# Patient Record
Sex: FEMALE | Race: WHITE | NOT HISPANIC OR LATINO | Employment: OTHER | URBAN - METROPOLITAN AREA
[De-identification: names, ages, dates, MRNs, and addresses within clinical notes are randomized per-mention and may not be internally consistent; named-entity substitution may affect disease eponyms.]

---

## 2017-01-11 ENCOUNTER — OFFICE VISIT (OUTPATIENT)
Dept: FAMILY MEDICINE CLINIC | Facility: CLINIC | Age: 77
End: 2017-01-11

## 2017-01-11 VITALS
TEMPERATURE: 97.6 F | BODY MASS INDEX: 50.99 KG/M2 | DIASTOLIC BLOOD PRESSURE: 76 MMHG | RESPIRATION RATE: 18 BRPM | OXYGEN SATURATION: 94 % | HEART RATE: 66 BPM | HEIGHT: 63 IN | SYSTOLIC BLOOD PRESSURE: 164 MMHG | WEIGHT: 287.8 LBS

## 2017-01-11 DIAGNOSIS — F41.9 ANXIETY: ICD-10-CM

## 2017-01-11 DIAGNOSIS — E53.8 COBALAMIN DEFICIENCY: ICD-10-CM

## 2017-01-11 DIAGNOSIS — E55.9 VITAMIN D DEFICIENCY: Primary | ICD-10-CM

## 2017-01-11 DIAGNOSIS — E66.01 OBESITY, MORBID, BMI 50 OR HIGHER (HCC): ICD-10-CM

## 2017-01-11 DIAGNOSIS — I89.0 LYMPHEDEMA: ICD-10-CM

## 2017-01-11 DIAGNOSIS — I10 ESSENTIAL HYPERTENSION: ICD-10-CM

## 2017-01-11 DIAGNOSIS — I87.2 VENOUS STASIS DERMATITIS OF BOTH LOWER EXTREMITIES: ICD-10-CM

## 2017-01-11 DIAGNOSIS — D75.1 SECONDARY POLYCYTHEMIA: ICD-10-CM

## 2017-01-11 DIAGNOSIS — G47.33 OBSTRUCTIVE SLEEP APNEA SYNDROME: ICD-10-CM

## 2017-01-11 DIAGNOSIS — Z79.899 HIGH RISK MEDICATION USE: ICD-10-CM

## 2017-01-11 PROCEDURE — 99214 OFFICE O/P EST MOD 30 MIN: CPT | Performed by: FAMILY MEDICINE

## 2017-01-11 RX ORDER — CLOBETASOL PROPIONATE 0.5 MG/G
OINTMENT TOPICAL 2 TIMES DAILY
Qty: 60 G | Refills: 2 | Status: SHIPPED | OUTPATIENT
Start: 2017-01-11 | End: 2018-07-23

## 2017-01-11 RX ORDER — HYDROCODONE BITARTRATE AND ACETAMINOPHEN 5; 325 MG/1; MG/1
1 TABLET ORAL EVERY 4 HOURS PRN
Qty: 120 TABLET | Refills: 0 | Status: SHIPPED | OUTPATIENT
Start: 2017-01-11 | End: 2017-04-12 | Stop reason: ALTCHOICE

## 2017-01-11 NOTE — PROGRESS NOTES
HPI  Tamara Singh is a 76 y.o. female who is here for follow up of chronic back pain as well as a rash on her lower legs and other medical issues as noted.  Overall status is stable except rash on legs has worsened since her brother is no longer able to apply wraps.  Patient will call dermatologist for reevaluation.  Also has a persistent lesion on her back which needs to be checked.  Otherwise no new significant medical complaints.      Review of Systems   Musculoskeletal: Positive for back pain.   Skin: Positive for rash.   All other systems reviewed and are negative.        Past Medical History   Diagnosis Date   • Arthritis    • Hypertension        Past Surgical History   Procedure Laterality Date   • Cholecystectomy  1974       No family history on file.    Social History     Social History   • Marital status: Single     Spouse name: N/A   • Number of children: N/A   • Years of education: N/A     Occupational History   • Not on file.     Social History Main Topics   • Smoking status: Not on file   • Smokeless tobacco: Not on file   • Alcohol use Not on file   • Drug use: Not on file   • Sexual activity: Not on file     Other Topics Concern   • Not on file     Social History Narrative   • No narrative on file         Physical Exam   Constitutional: She is oriented to person, place, and time. She appears well-developed and well-nourished. No distress.   HENT:   Head: Normocephalic.   Mouth/Throat: Oropharynx is clear and moist.   Eyes: Conjunctivae are normal. Pupils are equal, round, and reactive to light.   Neck: No thyromegaly present.   Cardiovascular: Normal rate and regular rhythm.    Pulmonary/Chest: Effort normal and breath sounds normal.   Abdominal: Soft. She exhibits no distension and no mass. There is no tenderness.   Musculoskeletal: She exhibits no edema or deformity.   Neurological: She is alert and oriented to person, place, and time. She exhibits normal muscle tone. Coordination normal.   Skin:  Skin is warm and dry.        Psychiatric: She has a normal mood and affect. Her behavior is normal. Judgment and thought content normal.   Nursing note and vitals reviewed.        Assessment/Plan    Tamara was seen today for hypertension, back pain and edema.    Diagnoses and all orders for this visit:    Vitamin D deficiency  -     Vitamin D 1,25 Dihydroxy    Cobalamin deficiency  -     CBC & Differential  -     Vitamin B12    Obesity, morbid, BMI 50 or higher    Obstructive sleep apnea syndrome    Secondary polycythemia    High risk medication use  -     CBC & Differential  -     Comprehensive Metabolic Panel    Anxiety    Lymphedema    Venous stasis dermatitis of both lower extremities    Essential hypertension    Other orders  -     clobetasol (TEMOVATE) 0.05 % ointment; Apply  topically 2 (Two) Times a Day.  -     HYDROcodone-acetaminophen (NORCO) 5-325 MG per tablet; Take 1 tablet by mouth Every 4 (Four) Hours As Needed for moderate pain (4-6).        Patient is here for routine follow-up of multiple medical problems as noted above.  Continues with back pain and also stasis dermatitis both lower extremities.  Reports poor control with current ointment and will change as requested.  Also has a skin lesion on the back and recommend follow-up with her dermatologist.  Will get routine lab tests as discussed, otherwise continue current therapy and follow-up in 3 months or as needed.

## 2017-01-11 NOTE — MR AVS SNAPSHOT
Tamara GONZALEZ Francisco   1/11/2017 2:00 PM   Office Visit    Dept Phone:  [phone]   Encounter #:  [id number]    Provider:  Dawson Sauceda MD   Department:  [facility]                Your Full Care Plan              Today's Medication Changes          These changes are accurate as of: 1/11/17  2:26 PM.  If you have any questions, ask your nurse or doctor.               Medication(s)that have changed:     clobetasol 0.05 % ointment   Commonly known as:  TEMOVATE   Apply  topically 2 (Two) Times a Day.   What changed:  when to take this         Stop taking medication(s)listed here:     betamethasone dipropionate 0.05 % ointment   Commonly known as:  DIPROLENE           fluocinolone 0.025 % ointment   Commonly known as:  SYNALAR           MethylPREDNISolone 4 MG tablet   Commonly known as:  MEDROL                Where to Get Your Medications      These medications were sent to [facility]/pharmacy [address] AT [place] [phone] [place] [phone] [place] [address]     Phone:  [phone]     clobetasol 0.05 % ointment         You can get these medications from any pharmacy     Bring a paper prescription for each of these medications     HYDROcodone-acetaminophen 5-325 MG per tablet                  Your Updated Medication List          This list is accurate as of: 1/11/17  2:26 PM.  Always use your most recent med list.                amLODIPine-benazepril 10-40 MG per capsule   Commonly known as:  LOTREL   TAKE ONE CAPSULE BY MOUTH EVERY DAY       clobetasol 0.05 % ointment   Commonly known as:  TEMOVATE   Apply  topically 2 (Two) Times a Day.       clonazePAM 0.5 MG tablet   Commonly known as:  KlonoPIN   TAKE 1 TABLET BY MOUTH TWICE DAILY AS NEEDED FOR ANXIETY       cyclobenzaprine 5 MG tablet   Commonly known as:  FLEXERIL   Take 1 tablet by mouth 3 (three) times a day as  needed for muscle spasms.       HYDROcodone-acetaminophen 5-325 MG per tablet   Commonly known as:  NORCO   Take 1 tablet by mouth Every 4 (Four) Hours As Needed for moderate pain (4-6).       LASIX 40 MG tablet   Generic drug:  furosemide   TAKE 1 TABLETBY MOUTH EVERY MORNING       meloxicam 7.5 MG tablet   Commonly known as:  MOBIC   Take 1 tablet by mouth Daily.       PROAIR  (90 BASE) MCG/ACT inhaler   Generic drug:  albuterol   INHALE 2 PUFFS BY MOUTH 4 TIMES DAILY AS NEEDED       VASCULERA tablet       Vitamin D3 2000 UNITS capsule               We Performed the Following     CBC & Differential     Comprehensive Metabolic Panel     Vitamin B12     Vitamin D 1,25 Dihydroxy       You Were Diagnosed With        Codes Comments    Vitamin D deficiency    -  Primary ICD-10-CM: E55.9  ICD-9-CM: 268.9     Cobalamin deficiency     ICD-10-CM: E53.8  ICD-9-CM: 266.2     Obesity, morbid, BMI 50 or higher     ICD-10-CM: E66.01  ICD-9-CM: 278.01     Obstructive sleep apnea syndrome     ICD-10-CM: G47.33  ICD-9-CM: 327.23     Secondary polycythemia     ICD-10-CM: D75.1  ICD-9-CM: 289.0     High risk medication use     ICD-10-CM: Z79.899  ICD-9-CM: V58.69     Anxiety     ICD-10-CM: F41.9  ICD-9-CM: 300.00     Lymphedema     ICD-10-CM: I89.0  ICD-9-CM: 457.1     Venous stasis dermatitis of both lower extremities     ICD-10-CM: I83.11, I83.12  ICD-9-CM: 454.1     Essential hypertension     ICD-10-CM: I10  ICD-9-CM: 401.9       Instructions     None    Patient Instructions History      Upcoming Appointments     Visit Type Date Time Department    OFFICE VISIT 1/11/2017  2:00 PM [facility] [place]    OFFICE VISIT 4/12/2017  2:00 PM [employer] [facility]     Our records indicate that you have an active [employer] account.    You can view your After Visit Summary by going to mCASH and logging in with your [employer]nite username and password.  If you don't have a [employer] username and  "password but a parent or guardian has access to your record, the parent or guardian should login with their own [employer] username and password and access your record to view the After Visit Summary.    If you have questions, you can email [email or web address] or call [phone] to talk to our [employer] staff.  Remember, [employer] is NOT to be used for urgent needs.  For medical emergencies, dial 911.               Other Info from Your Visit           Your Appointments     Apr 12, 2017  2:00 PM EDT   Office Visit with Dawson Sauceda MD   [facility] (--)    [address] [facility] [address] [phone]           Arrive 15 minutes prior to appointment.              Allergies     Moxifloxacin      Penicillins        Reason for Visit     Hypertension 3 month follow up.    Back Pain lower back is red also.    Edema Both legs very red today. needs to make appt with Dr. Ralph Morel.      Vital Signs     Blood Pressure Pulse Temperature Respirations Height Weight    164/76 66 97.6 °F (36.4 °C) 18 62.5\" (158.8 cm) 287 lb 12.8 oz (131 kg)    Oxygen Saturation Body Mass Index Smoking Status             94% 51.8 kg/m2 Never Assessed         Problems and Diagnoses Noted     Anxiety problem    Vitamin B12 deficiency    High blood pressure    Lymphedema    Obesity, morbid, BMI 50 or higher    Sleep apnea    Elevated red blood cell count    Stasis dermatitis    Vitamin D deficiency    High risk medication use            "

## 2017-01-13 LAB
1,25(OH)2D3 SERPL-MCNC: 46.3 PG/ML (ref 19.9–79.3)
ALBUMIN SERPL-MCNC: 4.6 G/DL (ref 3.5–5.2)
ALBUMIN/GLOB SERPL: 1.3 G/DL
ALP SERPL-CCNC: 79 U/L (ref 39–117)
ALT SERPL-CCNC: 15 U/L (ref 1–33)
AST SERPL-CCNC: 22 U/L (ref 1–32)
BASOPHILS # BLD AUTO: 0.01 10*3/MM3 (ref 0–0.2)
BASOPHILS NFR BLD AUTO: 0.2 % (ref 0–1.5)
BILIRUB SERPL-MCNC: 0.8 MG/DL (ref 0.1–1.2)
BUN SERPL-MCNC: 17 MG/DL (ref 8–23)
BUN/CREAT SERPL: 21.8 (ref 7–25)
CALCIUM SERPL-MCNC: 10.1 MG/DL (ref 8.6–10.5)
CHLORIDE SERPL-SCNC: 99 MMOL/L (ref 98–107)
CO2 SERPL-SCNC: 27.4 MMOL/L (ref 22–29)
CREAT SERPL-MCNC: 0.78 MG/DL (ref 0.57–1)
EOSINOPHIL # BLD AUTO: 0.1 10*3/MM3 (ref 0–0.7)
EOSINOPHIL NFR BLD AUTO: 1.6 % (ref 0.3–6.2)
ERYTHROCYTE [DISTWIDTH] IN BLOOD BY AUTOMATED COUNT: 13.8 % (ref 11.7–13)
GLOBULIN SER CALC-MCNC: 3.5 GM/DL
GLUCOSE SERPL-MCNC: 87 MG/DL (ref 65–99)
HCT VFR BLD AUTO: 47.5 % (ref 35.6–45.5)
HGB BLD-MCNC: 15.5 G/DL (ref 11.9–15.5)
IMM GRANULOCYTES # BLD: 0 10*3/MM3 (ref 0–0.03)
IMM GRANULOCYTES NFR BLD: 0 % (ref 0–0.5)
LYMPHOCYTES # BLD AUTO: 2.68 10*3/MM3 (ref 0.9–4.8)
LYMPHOCYTES NFR BLD AUTO: 42.5 % (ref 19.6–45.3)
MCH RBC QN AUTO: 30.3 PG (ref 26.9–32)
MCHC RBC AUTO-ENTMCNC: 32.6 G/DL (ref 32.4–36.3)
MCV RBC AUTO: 93 FL (ref 80.5–98.2)
MONOCYTES # BLD AUTO: 0.5 10*3/MM3 (ref 0.2–1.2)
MONOCYTES NFR BLD AUTO: 7.9 % (ref 5–12)
NEUTROPHILS # BLD AUTO: 3.01 10*3/MM3 (ref 1.9–8.1)
NEUTROPHILS NFR BLD AUTO: 47.8 % (ref 42.7–76)
PLATELET # BLD AUTO: 268 10*3/MM3 (ref 140–500)
POTASSIUM SERPL-SCNC: 4.3 MMOL/L (ref 3.5–5.2)
PROT SERPL-MCNC: 8.1 G/DL (ref 6–8.5)
RBC # BLD AUTO: 5.11 10*6/MM3 (ref 3.9–5.2)
SODIUM SERPL-SCNC: 143 MMOL/L (ref 136–145)
VIT B12 SERPL-MCNC: 429 PG/ML (ref 211–946)
WBC # BLD AUTO: 6.3 10*3/MM3 (ref 4.5–10.7)

## 2017-03-09 RX ORDER — AMLODIPINE BESYLATE AND BENAZEPRIL HYDROCHLORIDE 10; 40 MG/1; MG/1
CAPSULE ORAL
Qty: 90 CAPSULE | Refills: 0 | Status: SHIPPED | OUTPATIENT
Start: 2017-03-09 | End: 2017-06-26 | Stop reason: SDUPTHER

## 2017-04-07 RX ORDER — METHYLPREDNISOLONE 4 MG/1
TABLET ORAL
Qty: 21 TABLET | Refills: 0 | Status: SHIPPED | OUTPATIENT
Start: 2017-04-07 | End: 2017-04-12 | Stop reason: ALTCHOICE

## 2017-04-11 RX ORDER — MELOXICAM 7.5 MG/1
TABLET ORAL
Qty: 30 TABLET | Refills: 5 | Status: SHIPPED | OUTPATIENT
Start: 2017-04-11 | End: 2017-07-19

## 2017-04-12 ENCOUNTER — OFFICE VISIT (OUTPATIENT)
Dept: FAMILY MEDICINE CLINIC | Facility: CLINIC | Age: 77
End: 2017-04-12

## 2017-04-12 VITALS
OXYGEN SATURATION: 98 % | TEMPERATURE: 97.5 F | SYSTOLIC BLOOD PRESSURE: 142 MMHG | RESPIRATION RATE: 17 BRPM | BODY MASS INDEX: 50.92 KG/M2 | HEART RATE: 54 BPM | WEIGHT: 287.4 LBS | DIASTOLIC BLOOD PRESSURE: 72 MMHG | HEIGHT: 63 IN

## 2017-04-12 DIAGNOSIS — I89.0 LYMPHEDEMA: ICD-10-CM

## 2017-04-12 DIAGNOSIS — E66.01 OBESITY, MORBID, BMI 50 OR HIGHER (HCC): ICD-10-CM

## 2017-04-12 DIAGNOSIS — M54.16 LUMBAR RADICULOPATHY: ICD-10-CM

## 2017-04-12 DIAGNOSIS — F41.9 ANXIETY: ICD-10-CM

## 2017-04-12 DIAGNOSIS — I10 ESSENTIAL HYPERTENSION: Primary | ICD-10-CM

## 2017-04-12 PROCEDURE — 99213 OFFICE O/P EST LOW 20 MIN: CPT | Performed by: FAMILY MEDICINE

## 2017-04-12 RX ORDER — HYDROCODONE BITARTRATE AND ACETAMINOPHEN 7.5; 325 MG/1; MG/1
1 TABLET ORAL EVERY 4 HOURS PRN
Qty: 120 TABLET | Refills: 0 | Status: SHIPPED | OUTPATIENT
Start: 2017-04-12 | End: 2017-07-19 | Stop reason: SDUPTHER

## 2017-04-12 NOTE — PROGRESS NOTES
HPI  Tamara Singh is a 77 y.o. female who is here for follow up especially of back pain.  Recently was given a Medrol Dosepak which she reports gives her significant relief to her back pain and she again is able to get up and walk around.  Otherwise overall status basically has remained stable.  Reviewed lab test from 3 months ago all of which were unremarkable.      Review of Systems   Musculoskeletal: Positive for arthralgias and back pain.   All other systems reviewed and are negative.        Past Medical History:   Diagnosis Date   • Arthritis    • Hypertension        Past Surgical History:   Procedure Laterality Date   • CHOLECYSTECTOMY  1974       No family history on file.    Social History     Social History   • Marital status: Single     Spouse name: N/A   • Number of children: N/A   • Years of education: N/A     Occupational History   • Not on file.     Social History Main Topics   • Smoking status: Not on file   • Smokeless tobacco: Not on file   • Alcohol use Not on file   • Drug use: Not on file   • Sexual activity: Not on file     Other Topics Concern   • Not on file     Social History Narrative   • No narrative on file         Physical Exam   Constitutional: She is oriented to person, place, and time. She appears well-developed and well-nourished. No distress.   HENT:   Head: Normocephalic.   Mouth/Throat: Oropharynx is clear and moist.   Eyes: Conjunctivae are normal. Pupils are equal, round, and reactive to light.   Neck: No thyromegaly present.   Cardiovascular: Normal rate and regular rhythm.    Pulmonary/Chest: Effort normal. No respiratory distress.   Abdominal: Soft. She exhibits no distension. There is no tenderness.   Musculoskeletal: She exhibits edema and deformity. She exhibits no tenderness.   Ambulates with a cane   Neurological: She is alert and oriented to person, place, and time. Coordination normal.   Skin: Skin is warm and dry.   Psychiatric: She has a normal mood and affect. Her  behavior is normal. Judgment and thought content normal.   Nursing note and vitals reviewed.        Assessment/Plan    Tamara was seen today for vitamin d deficiency, hypertension, obesity, back pain and anxiety.    Diagnoses and all orders for this visit:    Essential hypertension    Obesity, morbid, BMI 50 or higher    Lumbar radiculopathy    Lymphedema    Anxiety    Other orders  -     HYDROcodone-acetaminophen (NORCO) 7.5-325 MG per tablet; Take 1 tablet by mouth Every 4 (Four) Hours As Needed for Moderate Pain (4-6).      Patient is here for routine follow-up especially of recurrent back pain.  Again got good relief with a Medrol dose pack.  Also request increase dose of current pain medication which is given inadequate relief.  Continue current therapy and follow-up in 3 months at which time blood test will be rechecked.    This note includes information entered using a voice recognition dictation system.  Though reviewed, some nonsensible errors may remain.

## 2017-05-23 RX ORDER — METHYLPREDNISOLONE 4 MG/1
TABLET ORAL
Qty: 21 TABLET | Refills: 0 | Status: SHIPPED | OUTPATIENT
Start: 2017-05-23 | End: 2017-06-30 | Stop reason: SDUPTHER

## 2017-06-27 RX ORDER — AMLODIPINE BESYLATE AND BENAZEPRIL HYDROCHLORIDE 10; 40 MG/1; MG/1
CAPSULE ORAL
Qty: 90 CAPSULE | Refills: 0 | Status: SHIPPED | OUTPATIENT
Start: 2017-06-27 | End: 2017-09-15 | Stop reason: SDUPTHER

## 2017-06-30 RX ORDER — METHYLPREDNISOLONE 4 MG/1
TABLET ORAL
Qty: 21 TABLET | Refills: 0 | Status: SHIPPED | OUTPATIENT
Start: 2017-06-30 | End: 2017-07-19

## 2017-07-11 RX ORDER — FUROSEMIDE 40 MG/1
TABLET ORAL
Qty: 90 TABLET | Refills: 3 | Status: SHIPPED | OUTPATIENT
Start: 2017-07-11 | End: 2018-07-04 | Stop reason: SDUPTHER

## 2017-07-19 ENCOUNTER — OFFICE VISIT (OUTPATIENT)
Dept: FAMILY MEDICINE CLINIC | Facility: CLINIC | Age: 77
End: 2017-07-19

## 2017-07-19 VITALS
OXYGEN SATURATION: 94 % | HEIGHT: 62 IN | BODY MASS INDEX: 51.53 KG/M2 | SYSTOLIC BLOOD PRESSURE: 136 MMHG | WEIGHT: 280 LBS | DIASTOLIC BLOOD PRESSURE: 82 MMHG | RESPIRATION RATE: 16 BRPM | HEART RATE: 59 BPM | TEMPERATURE: 98.4 F

## 2017-07-19 DIAGNOSIS — I10 ESSENTIAL HYPERTENSION: ICD-10-CM

## 2017-07-19 DIAGNOSIS — E66.01 OBESITY, MORBID, BMI 50 OR HIGHER (HCC): Primary | ICD-10-CM

## 2017-07-19 DIAGNOSIS — Z79.899 HIGH RISK MEDICATION USE: ICD-10-CM

## 2017-07-19 DIAGNOSIS — G89.29 CHRONIC LEFT-SIDED LOW BACK PAIN WITH LEFT-SIDED SCIATICA: ICD-10-CM

## 2017-07-19 DIAGNOSIS — G47.33 OBSTRUCTIVE SLEEP APNEA SYNDROME: ICD-10-CM

## 2017-07-19 DIAGNOSIS — R53.83 FATIGUE, UNSPECIFIED TYPE: ICD-10-CM

## 2017-07-19 DIAGNOSIS — M54.6 PAIN IN THORACIC SPINE: ICD-10-CM

## 2017-07-19 DIAGNOSIS — M54.42 CHRONIC LEFT-SIDED LOW BACK PAIN WITH LEFT-SIDED SCIATICA: ICD-10-CM

## 2017-07-19 DIAGNOSIS — I89.0 LYMPHEDEMA: ICD-10-CM

## 2017-07-19 PROCEDURE — 99213 OFFICE O/P EST LOW 20 MIN: CPT | Performed by: FAMILY MEDICINE

## 2017-07-19 RX ORDER — HYDROCODONE BITARTRATE AND ACETAMINOPHEN 7.5; 325 MG/1; MG/1
1 TABLET ORAL EVERY 4 HOURS PRN
Qty: 120 TABLET | Refills: 0 | Status: SHIPPED | OUTPATIENT
Start: 2017-07-19 | End: 2017-10-20 | Stop reason: SDUPTHER

## 2017-07-19 RX ORDER — LANOLIN ALCOHOL/MO/W.PET/CERES
1000 CREAM (GRAM) TOPICAL 2 TIMES DAILY
COMMUNITY
End: 2017-10-20 | Stop reason: SDUPTHER

## 2017-07-19 NOTE — PROGRESS NOTES
HPI  Tamara Singh is a 77 y.o. female who is here for follow up of chronic back pain as well as general osteoarthritis and hypertension.  Patient reports some fatigue and also cold intolerance.      Review of Systems   Constitutional: Positive for chills.   Respiratory: Positive for apnea.    Musculoskeletal: Positive for back pain.   Psychiatric/Behavioral: The patient is nervous/anxious.    All other systems reviewed and are negative.        Past Medical History:   Diagnosis Date   • Arthritis    • Hypertension        Past Surgical History:   Procedure Laterality Date   • CHOLECYSTECTOMY  1974       No family history on file.    Social History     Social History   • Marital status: Single     Spouse name: N/A   • Number of children: N/A   • Years of education: N/A     Occupational History   • Not on file.     Social History Main Topics   • Smoking status: Not on file   • Smokeless tobacco: Not on file   • Alcohol use Not on file   • Drug use: Not on file   • Sexual activity: Not on file     Other Topics Concern   • Not on file     Social History Narrative   • No narrative on file         Physical Exam   Constitutional: She is oriented to person, place, and time. She appears well-developed and well-nourished.   HENT:   Head: Normocephalic.   Mouth/Throat: Oropharynx is clear and moist.   Eyes: Conjunctivae are normal. Pupils are equal, round, and reactive to light.   Neck: Normal range of motion. Neck supple. No tracheal deviation present.   Cardiovascular: Normal rate, regular rhythm and normal heart sounds.    Pulmonary/Chest: Effort normal and breath sounds normal. No respiratory distress.   Abdominal: Soft. She exhibits no distension.   Musculoskeletal: She exhibits edema and deformity.   Neurological: She is alert and oriented to person, place, and time. Coordination normal.   Skin: Skin is warm and dry.   Psychiatric: She has a normal mood and affect. Her behavior is normal. Judgment and thought content  normal.   Nursing note and vitals reviewed.        Assessment/Plan    Tamara was seen today for hypertension and obesity.    Diagnoses and all orders for this visit:    Obesity, morbid, BMI 50 or higher    Obstructive sleep apnea syndrome    Lymphedema    Pain in thoracic spine    High risk medication use  -     CBC & Differential  -     Comprehensive Metabolic Panel    Fatigue, unspecified type  -     CBC & Differential  -     TSH+Free T4    Essential hypertension  -     Comprehensive Metabolic Panel    Chronic left-sided low back pain with left-sided sciatica    Other orders  -     HYDROcodone-acetaminophen (NORCO) 7.5-325 MG per tablet; Take 1 tablet by mouth Every 4 (Four) Hours As Needed for Moderate Pain .        Patient here for routine follow-up especially chronic back pain which is only relieved with occasional pain medication as noted above.  Usually prescription lasts her 3 months and recently not being abused etc.  Also patient reports some general fatigue and cold intolerance and will get routine lab work as noted above as well as thyroid panel.  Otherwise continue current therapy and follow-up in 3 months or as needed.    This note includes information entered using a voice recognition dictation system.  Though reviewed, some nonsensible errors may remain.

## 2017-07-20 LAB
ALBUMIN SERPL-MCNC: 4.3 G/DL (ref 3.5–4.8)
ALBUMIN/GLOB SERPL: 1.3 {RATIO} (ref 1.2–2.2)
ALP SERPL-CCNC: 72 IU/L (ref 39–117)
ALT SERPL-CCNC: 18 IU/L (ref 0–32)
AST SERPL-CCNC: 25 IU/L (ref 0–40)
BASOPHILS # BLD AUTO: 0 X10E3/UL (ref 0–0.2)
BASOPHILS NFR BLD AUTO: 0 %
BILIRUB SERPL-MCNC: 0.9 MG/DL (ref 0–1.2)
BUN SERPL-MCNC: 17 MG/DL (ref 8–27)
BUN/CREAT SERPL: 22 (ref 12–28)
CALCIUM SERPL-MCNC: 9.8 MG/DL (ref 8.7–10.3)
CHLORIDE SERPL-SCNC: 96 MMOL/L (ref 96–106)
CO2 SERPL-SCNC: 25 MMOL/L (ref 18–29)
CREAT SERPL-MCNC: 0.79 MG/DL (ref 0.57–1)
EOSINOPHIL # BLD AUTO: 0.1 X10E3/UL (ref 0–0.4)
EOSINOPHIL NFR BLD AUTO: 2 %
ERYTHROCYTE [DISTWIDTH] IN BLOOD BY AUTOMATED COUNT: 15.1 % (ref 12.3–15.4)
GLOBULIN SER CALC-MCNC: 3.4 G/DL (ref 1.5–4.5)
GLUCOSE SERPL-MCNC: 85 MG/DL (ref 65–99)
HCT VFR BLD AUTO: 44.5 % (ref 34–46.6)
HGB BLD-MCNC: 15.5 G/DL (ref 11.1–15.9)
IMM GRANULOCYTES # BLD: 0 X10E3/UL (ref 0–0.1)
IMM GRANULOCYTES NFR BLD: 0 %
LYMPHOCYTES # BLD AUTO: 2.8 X10E3/UL (ref 0.7–3.1)
LYMPHOCYTES NFR BLD AUTO: 37 %
MCH RBC QN AUTO: 30.8 PG (ref 26.6–33)
MCHC RBC AUTO-ENTMCNC: 34.8 G/DL (ref 31.5–35.7)
MCV RBC AUTO: 88 FL (ref 79–97)
MONOCYTES # BLD AUTO: 0.5 X10E3/UL (ref 0.1–0.9)
MONOCYTES NFR BLD AUTO: 7 %
NEUTROPHILS # BLD AUTO: 4.1 X10E3/UL (ref 1.4–7)
NEUTROPHILS NFR BLD AUTO: 54 %
PLATELET # BLD AUTO: 288 X10E3/UL (ref 150–379)
POTASSIUM SERPL-SCNC: 4.4 MMOL/L (ref 3.5–5.2)
PROT SERPL-MCNC: 7.7 G/DL (ref 6–8.5)
RBC # BLD AUTO: 5.04 X10E6/UL (ref 3.77–5.28)
SODIUM SERPL-SCNC: 141 MMOL/L (ref 134–144)
T4 FREE SERPL-MCNC: 1.29 NG/DL (ref 0.82–1.77)
TSH SERPL DL<=0.005 MIU/L-ACNC: 4.41 UIU/ML (ref 0.45–4.5)
WBC # BLD AUTO: 7.6 X10E3/UL (ref 3.4–10.8)

## 2017-09-16 RX ORDER — AMLODIPINE BESYLATE AND BENAZEPRIL HYDROCHLORIDE 10; 40 MG/1; MG/1
CAPSULE ORAL
Qty: 90 CAPSULE | Refills: 0 | Status: SHIPPED | OUTPATIENT
Start: 2017-09-16 | End: 2017-12-13 | Stop reason: SDUPTHER

## 2017-10-20 ENCOUNTER — OFFICE VISIT (OUTPATIENT)
Dept: FAMILY MEDICINE CLINIC | Facility: CLINIC | Age: 77
End: 2017-10-20

## 2017-10-20 VITALS
BODY MASS INDEX: 52.08 KG/M2 | SYSTOLIC BLOOD PRESSURE: 130 MMHG | OXYGEN SATURATION: 96 % | DIASTOLIC BLOOD PRESSURE: 68 MMHG | RESPIRATION RATE: 16 BRPM | WEIGHT: 283 LBS | TEMPERATURE: 97.3 F | HEIGHT: 62 IN | HEART RATE: 54 BPM

## 2017-10-20 DIAGNOSIS — D75.1 SECONDARY POLYCYTHEMIA: ICD-10-CM

## 2017-10-20 DIAGNOSIS — I89.0 LYMPHEDEMA: ICD-10-CM

## 2017-10-20 DIAGNOSIS — M54.16 LUMBAR RADICULOPATHY: ICD-10-CM

## 2017-10-20 DIAGNOSIS — G47.33 OBSTRUCTIVE SLEEP APNEA SYNDROME: ICD-10-CM

## 2017-10-20 DIAGNOSIS — E66.01 OBESITY, MORBID, BMI 50 OR HIGHER (HCC): ICD-10-CM

## 2017-10-20 DIAGNOSIS — I10 ESSENTIAL HYPERTENSION: Primary | ICD-10-CM

## 2017-10-20 DIAGNOSIS — I83.12 VARICOSE VEINS OF BOTH LOWER EXTREMITIES WITH INFLAMMATION: ICD-10-CM

## 2017-10-20 DIAGNOSIS — E53.8 COBALAMIN DEFICIENCY: ICD-10-CM

## 2017-10-20 DIAGNOSIS — I83.11 VARICOSE VEINS OF BOTH LOWER EXTREMITIES WITH INFLAMMATION: ICD-10-CM

## 2017-10-20 DIAGNOSIS — E55.9 VITAMIN D DEFICIENCY: ICD-10-CM

## 2017-10-20 PROCEDURE — G0008 ADMIN INFLUENZA VIRUS VAC: HCPCS | Performed by: FAMILY MEDICINE

## 2017-10-20 PROCEDURE — 99213 OFFICE O/P EST LOW 20 MIN: CPT | Performed by: FAMILY MEDICINE

## 2017-10-20 RX ORDER — LANOLIN ALCOHOL/MO/W.PET/CERES
1000 CREAM (GRAM) TOPICAL DAILY
Qty: 100 TABLET | Refills: 3
Start: 2017-10-20 | End: 2020-07-06

## 2017-10-20 RX ORDER — BETAMETHASONE DIPROPIONATE 0.05 %
OINTMENT (GRAM) TOPICAL
Refills: 2 | COMMUNITY
Start: 2017-09-18 | End: 2018-07-23

## 2017-10-20 RX ORDER — CHOLECALCIFEROL (VITAMIN D3) 25 MCG
1000 CAPSULE ORAL DAILY
Qty: 100 CAPSULE | Refills: 3
Start: 2017-10-20 | End: 2020-07-06

## 2017-10-20 RX ORDER — HYDROCODONE BITARTRATE AND ACETAMINOPHEN 7.5; 325 MG/1; MG/1
1 TABLET ORAL EVERY 4 HOURS PRN
Qty: 120 TABLET | Refills: 0 | Status: SHIPPED | OUTPATIENT
Start: 2017-10-20 | End: 2018-04-18 | Stop reason: SDUPTHER

## 2017-10-20 RX ORDER — METHYLPREDNISOLONE 4 MG/1
TABLET ORAL
Qty: 21 TABLET | Refills: 0 | Status: SHIPPED | OUTPATIENT
Start: 2017-10-20 | End: 2018-01-19

## 2017-10-20 NOTE — PROGRESS NOTES
HPI  Tamara Singh is a 77 y.o. female who is here for follow up chronic low back pain relieved with occasional pain medication and very occasional Medrol Dosepak.  Overall denies any new complaints.  Lab work from 3 months ago unremarkable.  Chronic lymphedema seems to be fairly stable.  No skin breakdown.      Review of Systems   Cardiovascular: Positive for leg swelling.   Musculoskeletal: Positive for back pain.         Past Medical History:   Diagnosis Date   • Arthritis    • Hypertension        Past Surgical History:   Procedure Laterality Date   • CHOLECYSTECTOMY  1974       No family history on file.    Social History     Social History   • Marital status: Single     Spouse name: N/A   • Number of children: N/A   • Years of education: N/A     Occupational History   • Not on file.     Social History Main Topics   • Smoking status: Never Smoker   • Smokeless tobacco: Never Used   • Alcohol use No   • Drug use: Yes     Special: Hydrocodone   • Sexual activity: No     Other Topics Concern   • Not on file     Social History Narrative         Physical Exam   Constitutional: She is oriented to person, place, and time. She appears well-developed and well-nourished.   HENT:   Head: Normocephalic.   Mouth/Throat: Oropharynx is clear and moist.   Eyes: EOM are normal. Pupils are equal, round, and reactive to light.   Neck: Normal range of motion. Neck supple. No thyromegaly present.   Cardiovascular: Normal rate, regular rhythm and normal heart sounds.    Pulmonary/Chest: Effort normal and breath sounds normal. No respiratory distress.   Abdominal: Soft. Bowel sounds are normal. She exhibits no distension and no mass.   Musculoskeletal: She exhibits no edema or deformity.   Neurological: She is alert and oriented to person, place, and time. Coordination normal.   Skin: Skin is warm and dry.   Psychiatric: She has a normal mood and affect. Her behavior is normal. Judgment and thought content normal.   Nursing note and  vitals reviewed.        Assessment/Plan    Tamara was seen today for hypertension and obesity.    Diagnoses and all orders for this visit:    Essential hypertension    Varicose veins of both lower extremities with inflammation    Obstructive sleep apnea syndrome    Vitamin D deficiency  -     Cholecalciferol (VITAMIN D3) 1000 units capsule; Take 1,000 Units by mouth Daily. TAKE 1 CAPSULE BY MOUTH DAILY    Obesity, morbid, BMI 50 or higher    Lumbar radiculopathy  -     MethylPREDNISolone (MEDROL) 4 MG tablet; follow package directions    Lymphedema    Secondary polycythemia    Cobalamin deficiency  -     vitamin B-12 (CYANOCOBALAMIN) 1000 MCG tablet; Take 1 tablet by mouth Daily.    Other orders  -     HYDROcodone-acetaminophen (NORCO) 7.5-325 MG per tablet; Take 1 tablet by mouth Every 4 (Four) Hours As Needed for Moderate Pain .      Patient is here for routine follow-up of above-noted medical problems.  Overall seems to be stable on current regimen which will be continued including follow-up every 3 months.  Most recent lab work unremarkable and will be repeated at next visit.    This note includes information entered using a voice recognition dictation system.  Though reviewed, some nonsensible errors may remain.

## 2017-11-03 RX ORDER — CLONAZEPAM 0.5 MG/1
TABLET ORAL
Qty: 60 TABLET | Refills: 2 | Status: SHIPPED | OUTPATIENT
Start: 2017-11-03 | End: 2019-07-19

## 2017-11-03 NOTE — TELEPHONE ENCOUNTER
Phoned medication into [facility] pharmacy with 2 refills.    Thank you.

## 2017-11-03 NOTE — TELEPHONE ENCOUNTER
Future appointment 01/19/2018.    Last O.V. 10/20/2017.  Angel 07/16/2017 (UPDATED).  Filled 10/20/2017.    Thank you.

## 2017-12-13 RX ORDER — AMLODIPINE BESYLATE AND BENAZEPRIL HYDROCHLORIDE 10; 40 MG/1; MG/1
CAPSULE ORAL
Qty: 90 CAPSULE | Refills: 3 | Status: SHIPPED | OUTPATIENT
Start: 2017-12-13 | End: 2018-12-03 | Stop reason: SDUPTHER

## 2018-01-19 ENCOUNTER — OFFICE VISIT (OUTPATIENT)
Dept: FAMILY MEDICINE CLINIC | Facility: CLINIC | Age: 78
End: 2018-01-19

## 2018-01-19 VITALS
WEIGHT: 276 LBS | TEMPERATURE: 97.5 F | SYSTOLIC BLOOD PRESSURE: 160 MMHG | BODY MASS INDEX: 50.79 KG/M2 | DIASTOLIC BLOOD PRESSURE: 70 MMHG | RESPIRATION RATE: 17 BRPM | HEIGHT: 62 IN

## 2018-01-19 DIAGNOSIS — I87.2 VENOUS STASIS DERMATITIS OF BOTH LOWER EXTREMITIES: ICD-10-CM

## 2018-01-19 DIAGNOSIS — I89.0 LYMPHEDEMA: Primary | ICD-10-CM

## 2018-01-19 DIAGNOSIS — E53.8 COBALAMIN DEFICIENCY: ICD-10-CM

## 2018-01-19 DIAGNOSIS — M54.16 LUMBAR RADICULOPATHY: ICD-10-CM

## 2018-01-19 DIAGNOSIS — Z79.899 HIGH RISK MEDICATION USE: ICD-10-CM

## 2018-01-19 DIAGNOSIS — E66.01 OBESITY, MORBID, BMI 50 OR HIGHER (HCC): ICD-10-CM

## 2018-01-19 PROCEDURE — 99213 OFFICE O/P EST LOW 20 MIN: CPT | Performed by: FAMILY MEDICINE

## 2018-01-19 RX ORDER — METHYLPREDNISOLONE 4 MG/1
TABLET ORAL
Qty: 21 TABLET | Refills: 0 | Status: SHIPPED | OUTPATIENT
Start: 2018-01-19 | End: 2018-02-16 | Stop reason: SDUPTHER

## 2018-01-19 RX ORDER — AZITHROMYCIN 250 MG/1
TABLET, FILM COATED ORAL
Refills: 0 | COMMUNITY
Start: 2017-12-28 | End: 2018-04-18

## 2018-01-19 NOTE — PROGRESS NOTES
HPI  Tamara Singh is a 77 y.o. female who is here for follow up of chronic back pain and hypertension as well as severe lymphedema.  Reports severe cellulitis several weeks ago and was placed on Zithromax by dermatologist which she continues on intermittent basis.  Remains with inflammation but significantly improved.  Having severe pain from her back going down her legs and is requesting a repeat Medrol Dosepak which has helped in the past.      Review of Systems   Cardiovascular: Positive for leg swelling.   Musculoskeletal: Positive for arthralgias and back pain.   Skin: Positive for rash.         Past Medical History:   Diagnosis Date   • Arthritis    • Hypertension        Past Surgical History:   Procedure Laterality Date   • CHOLECYSTECTOMY  1974       No family history on file.    Social History     Social History   • Marital status: Single     Spouse name: N/A   • Number of children: N/A   • Years of education: N/A     Occupational History   • Not on file.     Social History Main Topics   • Smoking status: Never Smoker   • Smokeless tobacco: Never Used   • Alcohol use No   • Drug use: Yes     Special: Hydrocodone   • Sexual activity: No     Other Topics Concern   • Not on file     Social History Narrative         Physical Exam   Constitutional: She is oriented to person, place, and time. She appears well-developed and well-nourished. No distress.   HENT:   Head: Normocephalic.   Mouth/Throat: Oropharynx is clear and moist.   Eyes: Conjunctivae and EOM are normal. Pupils are equal, round, and reactive to light.   Neck: Normal range of motion. No thyromegaly present.   Cardiovascular: Normal rate and regular rhythm.    Pulmonary/Chest: Effort normal. No respiratory distress.   Musculoskeletal: She exhibits edema and deformity.   Neurological: She is alert and oriented to person, place, and time. Coordination normal.   Skin: Skin is warm and dry. Rash noted. There is erythema.   Psychiatric: She has a normal  mood and affect. Her behavior is normal. Judgment and thought content normal.   Nursing note and vitals reviewed.        Assessment/Plan    Diagnoses and all orders for this visit:    Lymphedema  -     Comprehensive Metabolic Panel    Lumbar radiculopathy  -     MethylPREDNISolone (MEDROL) 4 MG tablet; follow package directions    Venous stasis dermatitis of both lower extremities    Obesity, morbid, BMI 50 or higher    Cobalamin deficiency  -     CBC & Differential  -     Vitamin B12    High risk medication use  -     CBC & Differential  -     Comprehensive Metabolic Panel    Patient presents with routine follow-up especially of chronic back pain with radicular component as well as lymphedema with stasis dermatitis being treated by dermatologist.  Uses occasional pain pills with good control of arthritic pains.  Obviously back pain and arthritic issues are complicated by her morbid obesity.  She has lost some weight since last visit.  Continue current therapy and follow-up in 3 months or as needed.    This note includes information entered using a voice recognition dictation system.  Though reviewed, some nonsensible errors may remain.

## 2018-01-20 LAB
ALBUMIN SERPL-MCNC: 4.4 G/DL (ref 3.5–4.8)
ALBUMIN/GLOB SERPL: 1.3 {RATIO} (ref 1.2–2.2)
ALP SERPL-CCNC: 76 IU/L (ref 39–117)
ALT SERPL-CCNC: 16 IU/L (ref 0–32)
AST SERPL-CCNC: 30 IU/L (ref 0–40)
BASOPHILS # BLD AUTO: 0 X10E3/UL (ref 0–0.2)
BASOPHILS NFR BLD AUTO: 0 %
BILIRUB SERPL-MCNC: 1 MG/DL (ref 0–1.2)
BUN SERPL-MCNC: 18 MG/DL (ref 8–27)
BUN/CREAT SERPL: 22 (ref 12–28)
CALCIUM SERPL-MCNC: 9.9 MG/DL (ref 8.7–10.3)
CHLORIDE SERPL-SCNC: 98 MMOL/L (ref 96–106)
CO2 SERPL-SCNC: 26 MMOL/L (ref 18–29)
CREAT SERPL-MCNC: 0.83 MG/DL (ref 0.57–1)
EOSINOPHIL # BLD AUTO: 0.2 X10E3/UL (ref 0–0.4)
EOSINOPHIL NFR BLD AUTO: 2 %
ERYTHROCYTE [DISTWIDTH] IN BLOOD BY AUTOMATED COUNT: 14.5 % (ref 12.3–15.4)
GLOBULIN SER CALC-MCNC: 3.3 G/DL (ref 1.5–4.5)
GLUCOSE SERPL-MCNC: 90 MG/DL (ref 65–99)
HCT VFR BLD AUTO: 42.6 % (ref 34–46.6)
HGB BLD-MCNC: 15.2 G/DL (ref 11.1–15.9)
IMM GRANULOCYTES # BLD: 0 X10E3/UL (ref 0–0.1)
IMM GRANULOCYTES NFR BLD: 0 %
LYMPHOCYTES # BLD AUTO: 3 X10E3/UL (ref 0.7–3.1)
LYMPHOCYTES NFR BLD AUTO: 41 %
MCH RBC QN AUTO: 31.1 PG (ref 26.6–33)
MCHC RBC AUTO-ENTMCNC: 35.7 G/DL (ref 31.5–35.7)
MCV RBC AUTO: 87 FL (ref 79–97)
MONOCYTES # BLD AUTO: 0.5 X10E3/UL (ref 0.1–0.9)
MONOCYTES NFR BLD AUTO: 7 %
NEUTROPHILS # BLD AUTO: 3.6 X10E3/UL (ref 1.4–7)
NEUTROPHILS NFR BLD AUTO: 50 %
PLATELET # BLD AUTO: 281 X10E3/UL (ref 150–379)
POTASSIUM SERPL-SCNC: 4 MMOL/L (ref 3.5–5.2)
PROT SERPL-MCNC: 7.7 G/DL (ref 6–8.5)
RBC # BLD AUTO: 4.89 X10E6/UL (ref 3.77–5.28)
SODIUM SERPL-SCNC: 142 MMOL/L (ref 134–144)
VIT B12 SERPL-MCNC: 466 PG/ML (ref 232–1245)
WBC # BLD AUTO: 7.3 X10E3/UL (ref 3.4–10.8)

## 2018-02-16 DIAGNOSIS — M54.16 LUMBAR RADICULOPATHY: ICD-10-CM

## 2018-02-16 RX ORDER — METHYLPREDNISOLONE 4 MG/1
TABLET ORAL
Qty: 21 TABLET | Refills: 0 | Status: SHIPPED | OUTPATIENT
Start: 2018-02-16 | End: 2018-04-18

## 2018-04-18 ENCOUNTER — OFFICE VISIT (OUTPATIENT)
Dept: FAMILY MEDICINE CLINIC | Facility: CLINIC | Age: 78
End: 2018-04-18

## 2018-04-18 VITALS
HEIGHT: 62 IN | RESPIRATION RATE: 16 BRPM | TEMPERATURE: 97.4 F | HEART RATE: 59 BPM | OXYGEN SATURATION: 97 % | WEIGHT: 275.5 LBS | BODY MASS INDEX: 50.7 KG/M2

## 2018-04-18 DIAGNOSIS — I89.0 LYMPHEDEMA: ICD-10-CM

## 2018-04-18 DIAGNOSIS — S81.801A OPEN WOUND OF RIGHT LOWER LEG, INITIAL ENCOUNTER: ICD-10-CM

## 2018-04-18 DIAGNOSIS — I87.2 VENOUS STASIS DERMATITIS OF BOTH LOWER EXTREMITIES: Primary | ICD-10-CM

## 2018-04-18 DIAGNOSIS — E66.01 OBESITY, MORBID, BMI 50 OR HIGHER (HCC): ICD-10-CM

## 2018-04-18 DIAGNOSIS — M54.16 LUMBAR RADICULOPATHY: ICD-10-CM

## 2018-04-18 DIAGNOSIS — I10 ESSENTIAL HYPERTENSION: ICD-10-CM

## 2018-04-18 PROCEDURE — 99213 OFFICE O/P EST LOW 20 MIN: CPT | Performed by: FAMILY MEDICINE

## 2018-04-18 RX ORDER — HYDROCODONE BITARTRATE AND ACETAMINOPHEN 7.5; 325 MG/1; MG/1
1 TABLET ORAL EVERY 4 HOURS PRN
Qty: 120 TABLET | Refills: 0 | Status: SHIPPED | OUTPATIENT
Start: 2018-04-18 | End: 2018-10-15 | Stop reason: ALTCHOICE

## 2018-04-18 RX ORDER — FLUOCINOLONE ACETONIDE 0.25 MG/G
OINTMENT TOPICAL
Refills: 3 | COMMUNITY
Start: 2018-03-27 | End: 2018-07-23

## 2018-04-18 RX ORDER — METHYLPREDNISOLONE 4 MG/1
TABLET ORAL
Qty: 21 TABLET | Refills: 0 | Status: SHIPPED | OUTPATIENT
Start: 2018-04-18 | End: 2018-07-23

## 2018-04-18 RX ORDER — CEPHALEXIN 500 MG/1
500 CAPSULE ORAL 3 TIMES DAILY
Qty: 30 CAPSULE | Refills: 0 | Status: SHIPPED | OUTPATIENT
Start: 2018-04-18 | End: 2018-07-23

## 2018-04-18 RX ORDER — DAKIN'S SOLUTION 0.125% (QUARTER STRENGTH) 0.12 %
SOLUTION MISCELLANEOUS
Refills: 2 | COMMUNITY
Start: 2018-02-28 | End: 2019-07-19

## 2018-04-18 NOTE — PROGRESS NOTES
HPI  Tamara Singh is a 78 y.o. female who is here for open wound on right anterior shin.  Also significant erythema noted both shins bilaterally with thickening of the skin.  Being followed by dermatologist and currently on Zithromax 250 mg twice a week as well as a steroid ointment.  Despite this treatment seems to be worsening.  Patient does report a rash on the back of her neck with penicillin in the past but there really is no good history of severe allergic reaction.  Patient continues with severe back pain radiating down her leg.  Only relief is pain medication on an as-needed basis.       Review of Systems   Constitutional: Negative for chills, diaphoresis and fever.   Cardiovascular: Positive for leg swelling.   Musculoskeletal: Positive for arthralgias and back pain.   Skin: Positive for rash and wound.         Past Medical History:   Diagnosis Date   • Arthritis    • Hypertension        Past Surgical History:   Procedure Laterality Date   • CHOLECYSTECTOMY  1974       No family history on file.    Social History     Social History   • Marital status: Single     Spouse name: N/A   • Number of children: N/A   • Years of education: N/A     Occupational History   • Not on file.     Social History Main Topics   • Smoking status: Never Smoker   • Smokeless tobacco: Never Used   • Alcohol use No   • Drug use:      Types: Hydrocodone   • Sexual activity: No     Other Topics Concern   • Not on file     Social History Narrative   • No narrative on file         Physical Exam   Constitutional: She is oriented to person, place, and time. She appears well-developed and well-nourished.   HENT:   Head: Atraumatic.   Mouth/Throat: Oropharynx is clear and moist.   Eyes: Pupils are equal, round, and reactive to light.   Neck: Normal range of motion. No tracheal deviation present.   Cardiovascular: Normal rate and regular rhythm.    Pulmonary/Chest: Effort normal. No respiratory distress.   Musculoskeletal: She exhibits  edema.   Lymphadenopathy:   Significant lymphedema both lower extremities   Neurological: She is alert and oriented to person, place, and time.   Skin: Skin is warm. There is erythema.        Psychiatric: She has a normal mood and affect. Her behavior is normal. Judgment and thought content normal.   Nursing note and vitals reviewed.        Assessment/Plan    Tamara was seen today for wound check and back pain.    Diagnoses and all orders for this visit:    Venous stasis dermatitis of both lower extremities    Lumbar radiculopathy  -     MethylPREDNISolone (MEDROL) 4 MG tablet; follow package directions    Lymphedema    Obesity, morbid, BMI 50 or higher    Essential hypertension    Open wound of right lower leg, initial encounter  -     Wound Culture - Wound, Leg, Right    Other orders  -     HYDROcodone-acetaminophen (NORCO) 7.5-325 MG per tablet; Take 1 tablet by mouth Every 4 (Four) Hours As Needed for Moderate Pain .  -     cephalexin (KEFLEX) 500 MG capsule; Take 1 capsule by mouth 3 (Three) Times a Day.        Patient presents with increased erythema and open wound on anterior right leg.  Discussed trial of a different antibiotic.  Reports a rash only with penicillin in the past and feel cross-reaction with cephalosporin unlikely.  Also brief dose of Medrol Dosepak to help with swelling and erythema.  Also usually helps with her lumbar radiculopathy.  Uses pain medication on an as-needed basis and there is no evidence of misuse or abuse of medication.  Patient remains fairly functional.  Call if symptoms worsen or do not improve over the next few days.  At some point may need to consider wound care or lymphedema clinic which apparently she has seen in the past.    This note includes information entered using a voice recognition dictation system.  Though reviewed, some nonsensible errors may remain.

## 2018-04-21 LAB
BACTERIA SPEC AEROBE CULT: NORMAL
BACTERIA SPEC CULT: NORMAL

## 2018-05-24 ENCOUNTER — APPOINTMENT (OUTPATIENT)
Dept: GENERAL RADIOLOGY | Facility: HOSPITAL | Age: 78
End: 2018-05-24

## 2018-05-24 ENCOUNTER — HOSPITAL ENCOUNTER (EMERGENCY)
Facility: HOSPITAL | Age: 78
Discharge: HOME | End: 2018-05-24
Attending: EMERGENCY MEDICINE | Admitting: EMERGENCY MEDICINE

## 2018-05-24 VITALS
HEART RATE: 68 BPM | TEMPERATURE: 97.3 F | RESPIRATION RATE: 16 BRPM | BODY MASS INDEX: 49.87 KG/M2 | WEIGHT: 271 LBS | DIASTOLIC BLOOD PRESSURE: 68 MMHG | SYSTOLIC BLOOD PRESSURE: 156 MMHG | HEIGHT: 62 IN | OXYGEN SATURATION: 97 %
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
## 2018-05-24 DIAGNOSIS — S93.401A SPRAIN OF RIGHT ANKLE, UNSPECIFIED LIGAMENT, INITIAL ENCOUNTER: Primary | ICD-10-CM
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
## 2018-05-24 DIAGNOSIS — S70.01XA CONTUSION OF RIGHT HIP, INITIAL ENCOUNTER: ICD-10-CM

## 2018-05-24 PROCEDURE — 99284 EMERGENCY DEPT VISIT MOD MDM: CPT

## 2018-05-24 PROCEDURE — 73502 X-RAY EXAM HIP UNI 2-3 VIEWS: CPT

## 2018-05-24 PROCEDURE — 73630 X-RAY EXAM OF FOOT: CPT

## 2018-05-24 PROCEDURE — 73610 X-RAY EXAM OF ANKLE: CPT

## 2018-05-24 RX ORDER — HYDROCODONE BITARTRATE AND ACETAMINOPHEN 5; 325 MG/1; MG/1
1 TABLET ORAL EVERY 6 HOURS PRN
Status: DISCONTINUED | OUTPATIENT
Start: 2018-05-24 | End: 2018-05-24 | Stop reason: HOSPADM

## 2018-05-24 RX ORDER — NAPROXEN 500 MG/1
500 TABLET ORAL 2 TIMES DAILY WITH MEALS
Qty: 25 TABLET | Refills: 0 | Status: SHIPPED | OUTPATIENT
Start: 2018-05-24 | End: 2018-07-23

## 2018-05-24 RX ADMIN — HYDROCODONE BITARTRATE AND ACETAMINOPHEN 1 TABLET: 5; 325 TABLET ORAL at 11:43

## 2018-05-24 NOTE — ED PROVIDER NOTES
EMERGENCY DEPARTMENT ENCOUNTER    Room Number:  43/43  Date seen:  5/24/2018  Time seen: 10:45 AM  PCP: Dawson Sauceda MD   History provided by- patient, family    HPI:  Chief complaint: pain post fall  Context:Tamara Singh is a 78 y.o. female who presents to the ED s/p fall that occurred earlier today in her kitchen. She states that while she was turning, she rolled her right ankle and fell. Since then, she has had pain to right ankle, right foot, and right hip, all of which are exacerbated by RLE movement and bearing weight on RLE. She denies blow to head, loss of consciousness, headache, neck pain, back pain, abd pain, chest pain, sensory loss, fever, and sustaining any other injury. She reports that she has hx of HTN, arthritis, and BLE lymphedema. Pt has no other complaints at this time.     Timing: intermittent   Duration: fall occurred earlier today  Location: right ankle, right foot, right hip  Radiation: none  Quality: pain  Intensity/Severity: moderate  Associated Symptoms: right ankle pain, right foot pain, right hip pain  Aggravating Factors: RLE movement, bearing weight on RLE  Alleviating Factors: resting RLE, keeping weight off of RLE  Previous Episodes: none mentioned  Treatment before arrival: none mentioned    MEDICAL RECORD REVIEW      ALLERGIES  Moxifloxacin and Penicillins    PAST MEDICAL HISTORY  Active Ambulatory Problems     Diagnosis Date Noted   • Lumbar radiculopathy 04/06/2016   • Anxiety 04/06/2016   • Secondary polycythemia 04/06/2016   • Arteriovenous malformation 04/06/2016   • Venous stasis dermatitis 04/06/2016   • Eczema 04/06/2016   • Hypertension 04/06/2016   • Hypoglycemia 04/06/2016   • Knee pain 04/06/2016   • Low back pain 04/06/2016   • Lymphedema 04/06/2016   • Obesity, morbid, BMI 50 or higher 04/06/2016   • Obstructive sleep apnea syndrome 04/06/2016   • Pain in thoracic spine 04/06/2016   • Varicose veins of lower extremity with inflammation 04/06/2016   • Cobalamin  deficiency 04/06/2016   • Vitamin D deficiency 04/06/2016   • Muscle spasms of both lower extremities 07/28/2016     Resolved Ambulatory Problems     Diagnosis Date Noted   • No Resolved Ambulatory Problems     Past Medical History:   Diagnosis Date   • Arthritis    • Hypertension        PAST SURGICAL HISTORY  Past Surgical History:   Procedure Laterality Date   • CHOLECYSTECTOMY  1974       FAMILY HISTORY  History reviewed. No pertinent family history.    SOCIAL HISTORY  Social History     Social History   • Marital status: Single     Spouse name: N/A   • Number of children: N/A   • Years of education: N/A     Occupational History   • Not on file.     Social History Main Topics   • Smoking status: Never Smoker   • Smokeless tobacco: Never Used   • Alcohol use No   • Drug use: Yes     Types: Hydrocodone   • Sexual activity: No     Other Topics Concern   • Not on file     Social History Narrative   • No narrative on file       REVIEW OF SYSTEMS  Review of Systems   Constitutional: Negative for chills and fever.   HENT: Negative for sore throat.    Respiratory: Negative for cough.    Cardiovascular: Negative for chest pain.   Gastrointestinal: Negative for abdominal pain, nausea and vomiting.   Genitourinary: Negative for dysuria.   Musculoskeletal: Negative for back pain and neck pain.        Right ankle pain, right foot pain, right hip pain   Neurological: Negative for numbness and headaches.   Psychiatric/Behavioral: The patient is not nervous/anxious.        PHYSICAL EXAM  ED Triage Vitals [05/24/18 0947]   Temp Heart Rate Resp BP SpO2   97.3 °F (36.3 °C) 88 16 160/70 97 % WNL      Temp src Heart Rate Source Patient Position BP Location FiO2 (%)   Tympanic Monitor -- -- --     Physical Exam   Constitutional: She is oriented to person, place, and time and well-developed, well-nourished, and in no distress. No distress.       HENT:   Head: Normocephalic and atraumatic.   Eyes: EOM are normal. Pupils are equal,  "round, and reactive to light.   Neck: Normal range of motion. Neck supple.   Cardiovascular: Normal rate, regular rhythm and normal heart sounds.    Pulmonary/Chest: Effort normal and breath sounds normal. No respiratory distress.   Abdominal: Soft. She exhibits no distension. There is no tenderness.   Abdomen is obese   Musculoskeletal:   Chronic \"lymphedema\" and erythema to bilateral anterior lower shins (normal for pt per pt), tenderness to anterior right foot and lateral right ankle, right ankle swelling, tenderness to right hip (difficult to determine exact area of pain due to body habitus), able to bear weight on RLE, pelvis stable, NV intact distally to RLE   Neurological: She is alert and oriented to person, place, and time.   Skin: Skin is warm and dry.   Nursing note and vitals reviewed.      RADIOLOGY      XR Foot 3+ View Right (Preliminary result)   Result time 05/24/18 11:50:59   Preliminary result by Gareth Salgado MD (05/24/18 11:50:59)                Impression:    No acute process identified.        RIGHT ANKLE 3 VIEWS     FINDINGS:  There is soft tissue swelling of the right ankle. Small  calcaneal spurs are seen. No fractures or dislocations are seen in the  right ankle.        RIGHT FOOT 3 VIEWS:  There is soft tissue swelling of the foot. No  fractures or dislocations are seen. Calcaneal spurs are seen.     IMPRESSION: Soft tissue swelling with no acute process identified.               Narrative:    HISTORY: Fell, right foot, right ankle and right hip pain     AP PELVIS AND RIGHT HIP 2 VIEWS     FINDINGS:  No acute bone, joint or soft tissue abnormalities are seen.  Small osseous density is seen in the right ischial region which is of  doubtful clinical significance. It does not resemble a fracture  fragment. There are lower lumbar degenerative changes.                   I ordered the above noted radiological studies and reviewed the images on the PACS system.        MEDICATIONS GIVEN IN " ER  Medications   HYDROcodone-acetaminophen (NORCO) 5-325 MG per tablet 1 tablet (1 tablet Oral Given 5/24/18 1143)         PROCEDURES  Procedures    COURSE & MEDICAL DECISION MAKING  Pertinent Imaging studies that were ordered and reviewed are noted above.  Results were reviewed/discussed with the patient and they were also made aware of online access.  Pt also made aware that some labs, such as cultures, will not be resulted during ER visit and follow up with PMD is necessary.       PROGRESS AND CONSULTS    Progress Notes:       1056- Ordered norco for pain. Ordered right hip and pelvis xray, right ankle xray, and right foot xray for further evaluation.     1235- Rechecked pt. She is resting comfortably and is in no acute distress. Discussed with pt and family about all pertinent results including right ankle xray findings (no acute fracture), right foot xray findings (no acute fracture), and right hip and pelvis xray findings (no acute fracture). Notified them of diagnoses and plan for discharge. Informed pt of plan to prescribe NSAID for pain and to apply ace wrap and air cast to her right ankle/right foot for support and comfort. Advised pt to apply ice to right ankle 3 to 4 times a day as needed for pain and swelling. Instructed to f/u with PMD for recheck. RTER warnings given. Pt and family understand and agree with plan. Addressed all questions.    1240- Reviewed pt's history and workup with Dr. Deras.  After a bedside evaluation, Dr. Deras agrees with the plan of care.    The patient's history, physical exam, and lab findings were discussed with the physician, who also performed a face to face history and physical exam.  I discussed all results and noted any abnormalities with patient/family.  Discussed with pt about absolute need to recheck abnormalities with their family physician.  I answered any of the patient's/family's questions.  Discussed plan for discharge, as there is no emergent indication  "for admission.  Pt/family is agreeable and understands need for follow up and repeat testing.  Pt/family is aware that discharge does not mean that nothing is wrong but it indicates no emergency is present and they must continue care with their family physician.  Pt is discharged with instructions to follow up with primary care doctor to have their blood pressure rechecked.     1250- Nursing staff applied ace wrap and air cast to pt's right ankle/right foot.       Disposition vitals:  /68   Pulse 68   Temp 97.3 °F (36.3 °C) (Tympanic)   Resp 16   Ht 157.5 cm (62\")   Wt 123 kg (271 lb)   SpO2 97%   BMI 49.57 kg/m²       DIAGNOSIS  Final diagnoses:   Sprain of right ankle, unspecified ligament, initial encounter   Fall at home, initial encounter   Contusion of right hip, initial encounter       FOLLOW UP   Dawson Sauceda MD  [address] [id number]  [phone]    Schedule an appointment as soon as possible for a visit in 1 week        RX     Medication List      New Prescriptions    naproxen 500 MG tablet  Commonly known as:  NAPROSYN  Take 1 tablet by mouth 2 (Two) Times a Day With Meals.          Documentation assistance provided by oz Roca for YISEL Johnston.  Information recorded by the scribe was done at my direction and has been verified and validated by me.  Electronically signed by Marin Roca on 5/24/2018 at time 12:41 PM       Marin Roca  05/24/18 1308       YISEL Kinsey  05/24/18 1341    "

## 2018-05-24 NOTE — ED PROVIDER NOTES
MD ATTESTATION NOTE    The AKUA and I have discussed this patient's history, physical exam, and treatment plan.  I have reviewed the documentation and personally had a face to face interaction with the patient. I affirm the documentation and agree with the treatment and plan.  The attached note describes my personal findings.    Pt presents with right ankle pain s/p eversion injury. Pt states that when she stood up, she twisted it and it has become swollen and painful since then. Informed Pt that her XR does not show any fractures and discussed plans to discharge Pt in an air splint or an ace bandage. Instructed Pt to ice area as needed. Pt understands and agrees to all plans. All questions answered.     Pt's foot is too obese fit in a walking boot. We will use a combination of air cast and an ace bandage to stabilize. She is a fall risk and we will not give her crutches.  Per Pt's RN, Pt was able to stand and go to the bedside commode.     Limited Physical exam:   Musculoskeletal: obese feet bilaterally. Tenderness in lateral malleolus and proximal volar aspect of her right foot.        Reyna Grey  05/24/18 1251       Jose Deras MD  05/24/18 2425

## 2018-05-24 NOTE — DISCHARGE INSTRUCTIONS
Ice to ankle 3-4 times a day as needed and to help with swelling    Use ace wrap as needed for comfort    Return Precautions    Although you are being discharged from the ED today, I encourage you to return for worsening symptoms.  Things can, and do, change such that treatment at home with medication may not be adequate.      Specifically, return for any of the following:    Chest pain, shortness of breath, pain or nausea and vomiting not controlled by medications provided.    Please make a follow up with your Primary Care Provider for a blood pressure recheck.

## 2018-05-25 ENCOUNTER — TELEPHONE (OUTPATIENT)
Dept: SOCIAL WORK | Facility: HOSPITAL | Age: 78
End: 2018-05-25

## 2018-05-25 NOTE — TELEPHONE ENCOUNTER
ED f/u phone call: states she will get script filled today and is using ice as directed. States that she is sore from fall, and will f/u w/ PCP within a week. No questions/concerns

## 2018-07-05 RX ORDER — FUROSEMIDE 40 MG/1
TABLET ORAL
Qty: 90 TABLET | Refills: 3 | Status: SHIPPED | OUTPATIENT
Start: 2018-07-05 | End: 2019-06-22 | Stop reason: SDUPTHER

## 2018-07-23 ENCOUNTER — OFFICE VISIT (OUTPATIENT)
Dept: FAMILY MEDICINE CLINIC | Facility: CLINIC | Age: 78
End: 2018-07-23

## 2018-07-23 VITALS
OXYGEN SATURATION: 95 % | BODY MASS INDEX: 51.67 KG/M2 | HEART RATE: 55 BPM | TEMPERATURE: 97.6 F | SYSTOLIC BLOOD PRESSURE: 130 MMHG | HEIGHT: 62 IN | WEIGHT: 280.8 LBS | DIASTOLIC BLOOD PRESSURE: 66 MMHG

## 2018-07-23 DIAGNOSIS — Z79.899 HIGH RISK MEDICATION USE: ICD-10-CM

## 2018-07-23 DIAGNOSIS — E55.9 VITAMIN D DEFICIENCY: ICD-10-CM

## 2018-07-23 DIAGNOSIS — E53.8 COBALAMIN DEFICIENCY: ICD-10-CM

## 2018-07-23 DIAGNOSIS — D75.1 SECONDARY POLYCYTHEMIA: ICD-10-CM

## 2018-07-23 DIAGNOSIS — I10 ESSENTIAL HYPERTENSION: ICD-10-CM

## 2018-07-23 DIAGNOSIS — M54.16 LUMBAR RADICULOPATHY: ICD-10-CM

## 2018-07-23 DIAGNOSIS — I87.2 VENOUS STASIS DERMATITIS OF BOTH LOWER EXTREMITIES: ICD-10-CM

## 2018-07-23 DIAGNOSIS — E66.01 OBESITY, MORBID, BMI 50 OR HIGHER (HCC): Primary | ICD-10-CM

## 2018-07-23 DIAGNOSIS — I89.0 LYMPHEDEMA: ICD-10-CM

## 2018-07-23 PROCEDURE — 96372 THER/PROPH/DIAG INJ SC/IM: CPT | Performed by: FAMILY MEDICINE

## 2018-07-23 PROCEDURE — 99213 OFFICE O/P EST LOW 20 MIN: CPT | Performed by: FAMILY MEDICINE

## 2018-07-23 RX ORDER — METHYLPREDNISOLONE ACETATE 80 MG/ML
80 INJECTION, SUSPENSION INTRA-ARTICULAR; INTRALESIONAL; INTRAMUSCULAR; SOFT TISSUE ONCE
Status: COMPLETED | OUTPATIENT
Start: 2018-07-23 | End: 2018-07-23

## 2018-07-23 RX ADMIN — METHYLPREDNISOLONE ACETATE 80 MG: 80 INJECTION, SUSPENSION INTRA-ARTICULAR; INTRALESIONAL; INTRAMUSCULAR; SOFT TISSUE at 14:42

## 2018-07-23 NOTE — PROGRESS NOTES
HPI  Tamara Singh is a 78 y.o. female who is here for follow up of multiple ongoing issues including chronic back pain with exacerbations as well as chronic dermatitis anterior shins which responded well to prednisone and Keflex.  Discontinued steroid ointments because of burning.  Does need to make follow-up appointment with dermatologist.      Review of Systems   Constitutional: Negative for chills and fever.   Musculoskeletal: Positive for arthralgias and back pain.   Skin: Positive for rash.   All other systems reviewed and are negative.        Past Medical History:   Diagnosis Date   • Arthritis    • Hypertension        Past Surgical History:   Procedure Laterality Date   • CHOLECYSTECTOMY  1974       No family history on file.    Social History     Social History   • Marital status: Single     Spouse name: N/A   • Number of children: N/A   • Years of education: N/A     Occupational History   • Not on file.     Social History Main Topics   • Smoking status: Never Smoker   • Smokeless tobacco: Never Used   • Alcohol use No   • Drug use: Yes     Types: Hydrocodone   • Sexual activity: No     Other Topics Concern   • Not on file     Social History Narrative   • No narrative on file         Physical Exam   Constitutional: She is oriented to person, place, and time. She appears well-developed and well-nourished. No distress.   HENT:   Head: Normocephalic and atraumatic.   Nose: Nose normal.   Mouth/Throat: Oropharynx is clear and moist.   Eyes: Pupils are equal, round, and reactive to light. Conjunctivae are normal.   Neck: Normal range of motion. No thyromegaly present.   Cardiovascular: Normal rate and regular rhythm.    Pulmonary/Chest: Effort normal. No respiratory distress.   Abdominal: Soft. She exhibits no distension. There is no tenderness.   Musculoskeletal: She exhibits edema and deformity. She exhibits no tenderness.   Neurological: She is alert and oriented to person, place, and time. Coordination  normal.   Skin: Skin is warm and dry. Rash noted. There is erythema.   Thin, erythematous skin both shins.  Skin intact with no evidence of active infection   Psychiatric: She has a normal mood and affect. Her behavior is normal. Judgment and thought content normal.   Nursing note and vitals reviewed.        Assessment/Plan    Tamara was seen today for hypertension, obesity, anxiety and edema.    Diagnoses and all orders for this visit:    Obesity, morbid, BMI 50 or higher (CMS/[facility])    Vitamin D deficiency  -     Vitamin D 1,25 Dihydroxy    Essential hypertension  -     Basic Metabolic Panel    Lymphedema  -     Basic Metabolic Panel    High risk medication use  -     Basic Metabolic Panel    Secondary polycythemia  -     CBC & Differential    Cobalamin deficiency  -     CBC & Differential  -     Vitamin B12    Lumbar radiculopathy  -     methylPREDNISolone acetate (DEPO-medrol) injection 80 mg; Inject 1 mL into the appropriate muscle as directed by prescriber 1 (One) Time.    Venous stasis dermatitis of both lower extremities  -     methylPREDNISolone acetate (DEPO-medrol) injection 80 mg; Inject 1 mL into the appropriate muscle as directed by prescriber 1 (One) Time.      Patient for routine follow-up of above-noted medical problems.  Reports significant improvement of back pain and rash on legs with prednisone and Keflex.  Recommend follow-up appointment with dermatologist.  Also recommend trial of IM methylprednisolone acetate to see if responds?  Otherwise follow-up visit in 3 months.  Pain under adequate control with occasional pain medication at this time.  NSAIDs have been discontinued as discussed.  No evidence of misuse or abuse of medication and patient is well aware of risks involved in continuing opiates.  Agree benefits outweigh risks at this point.  Follow-up in 3 months.    This note includes information entered using a voice recognition dictation system.  Though reviewed, some nonsensible errors may  remain.

## 2018-07-25 LAB
1,25(OH)2D3 SERPL-MCNC: 53.2 PG/ML (ref 19.9–79.3)
BASOPHILS # BLD AUTO: 0 X10E3/UL (ref 0–0.2)
BASOPHILS NFR BLD AUTO: 0 %
BUN SERPL-MCNC: 21 MG/DL (ref 8–27)
BUN/CREAT SERPL: 25 (ref 12–28)
CALCIUM SERPL-MCNC: 9.9 MG/DL (ref 8.7–10.3)
CHLORIDE SERPL-SCNC: 99 MMOL/L (ref 96–106)
CO2 SERPL-SCNC: 26 MMOL/L (ref 20–29)
CREAT SERPL-MCNC: 0.83 MG/DL (ref 0.57–1)
EOSINOPHIL # BLD AUTO: 0.2 X10E3/UL (ref 0–0.4)
EOSINOPHIL NFR BLD AUTO: 2 %
ERYTHROCYTE [DISTWIDTH] IN BLOOD BY AUTOMATED COUNT: 14.6 % (ref 12.3–15.4)
GLUCOSE SERPL-MCNC: 86 MG/DL (ref 65–99)
HCT VFR BLD AUTO: 44.1 % (ref 34–46.6)
HGB BLD-MCNC: 15.1 G/DL (ref 11.1–15.9)
IMM GRANULOCYTES # BLD: 0 X10E3/UL (ref 0–0.1)
IMM GRANULOCYTES NFR BLD: 0 %
LYMPHOCYTES # BLD AUTO: 3.3 X10E3/UL (ref 0.7–3.1)
LYMPHOCYTES NFR BLD AUTO: 43 %
MCH RBC QN AUTO: 30.2 PG (ref 26.6–33)
MCHC RBC AUTO-ENTMCNC: 34.2 G/DL (ref 31.5–35.7)
MCV RBC AUTO: 88 FL (ref 79–97)
MONOCYTES # BLD AUTO: 0.4 X10E3/UL (ref 0.1–0.9)
MONOCYTES NFR BLD AUTO: 5 %
NEUTROPHILS # BLD AUTO: 3.7 X10E3/UL (ref 1.4–7)
NEUTROPHILS NFR BLD AUTO: 50 %
PLATELET # BLD AUTO: 290 X10E3/UL (ref 150–379)
POTASSIUM SERPL-SCNC: 4.6 MMOL/L (ref 3.5–5.2)
RBC # BLD AUTO: 5 X10E6/UL (ref 3.77–5.28)
SODIUM SERPL-SCNC: 141 MMOL/L (ref 134–144)
VIT B12 SERPL-MCNC: 369 PG/ML (ref 232–1245)
WBC # BLD AUTO: 7.5 X10E3/UL (ref 3.4–10.8)

## 2018-10-15 ENCOUNTER — OFFICE VISIT (OUTPATIENT)
Dept: FAMILY MEDICINE CLINIC | Facility: CLINIC | Age: 78
End: 2018-10-15

## 2018-10-15 VITALS
SYSTOLIC BLOOD PRESSURE: 144 MMHG | WEIGHT: 278.8 LBS | BODY MASS INDEX: 51.3 KG/M2 | HEART RATE: 46 BPM | HEIGHT: 62 IN | RESPIRATION RATE: 16 BRPM | TEMPERATURE: 97.7 F | DIASTOLIC BLOOD PRESSURE: 60 MMHG | OXYGEN SATURATION: 94 %

## 2018-10-15 DIAGNOSIS — R00.1 BRADYCARDIA: ICD-10-CM

## 2018-10-15 DIAGNOSIS — Z79.899 HIGH RISK MEDICATION USE: ICD-10-CM

## 2018-10-15 DIAGNOSIS — G89.29 CHRONIC LEFT-SIDED LOW BACK PAIN WITH LEFT-SIDED SCIATICA: ICD-10-CM

## 2018-10-15 DIAGNOSIS — D75.1 SECONDARY POLYCYTHEMIA: ICD-10-CM

## 2018-10-15 DIAGNOSIS — I89.0 LYMPHEDEMA: ICD-10-CM

## 2018-10-15 DIAGNOSIS — G47.33 OBSTRUCTIVE SLEEP APNEA SYNDROME: Primary | ICD-10-CM

## 2018-10-15 DIAGNOSIS — E66.01 OBESITY, MORBID, BMI 50 OR HIGHER (HCC): ICD-10-CM

## 2018-10-15 DIAGNOSIS — Z23 IMMUNIZATION DUE: ICD-10-CM

## 2018-10-15 DIAGNOSIS — M54.42 CHRONIC LEFT-SIDED LOW BACK PAIN WITH LEFT-SIDED SCIATICA: ICD-10-CM

## 2018-10-15 DIAGNOSIS — I10 ESSENTIAL HYPERTENSION: ICD-10-CM

## 2018-10-15 PROCEDURE — G0008 ADMIN INFLUENZA VIRUS VAC: HCPCS | Performed by: FAMILY MEDICINE

## 2018-10-15 PROCEDURE — 99213 OFFICE O/P EST LOW 20 MIN: CPT | Performed by: FAMILY MEDICINE

## 2018-10-15 PROCEDURE — 90662 IIV NO PRSV INCREASED AG IM: CPT | Performed by: FAMILY MEDICINE

## 2018-10-15 RX ORDER — EMOLLIENT COMBINATION NO.53
CREAM (GRAM) TOPICAL
Refills: 4 | COMMUNITY
Start: 2018-08-30 | End: 2020-07-06

## 2018-10-15 RX ORDER — HYDROCODONE BITARTRATE AND ACETAMINOPHEN 5; 325 MG/1; MG/1
1 TABLET ORAL EVERY 4 HOURS PRN
Qty: 120 TABLET | Refills: 0 | Status: SHIPPED | OUTPATIENT
Start: 2018-10-15 | End: 2019-04-17 | Stop reason: SDUPTHER

## 2018-10-15 NOTE — PROGRESS NOTES
HPI  Tamara Singh is a 78 y.o. female who is here for follow up especially of chronic back pain going down the left leg.  Currently under fair control with occasional pain medication and would like to go 20 lower dose.  Also having ankle pain mostly related to her chronic edema and erythema.  Recently diagnosed with cataract especially left eye and has follow-up appointment for possible surgery.      Review of Systems   Eyes: Positive for photophobia and visual disturbance.   Cardiovascular: Positive for leg swelling.   Musculoskeletal: Positive for back pain.   All other systems reviewed and are negative.        Past Medical History:   Diagnosis Date   • Arthritis    • Hypertension        Past Surgical History:   Procedure Laterality Date   • CHOLECYSTECTOMY  1974       No family history on file.    Social History     Social History   • Marital status: Single     Spouse name: N/A   • Number of children: N/A   • Years of education: N/A     Occupational History   • Not on file.     Social History Main Topics   • Smoking status: Never Smoker   • Smokeless tobacco: Never Used   • Alcohol use No   • Drug use: Yes     Types: Hydrocodone   • Sexual activity: No     Other Topics Concern   • Not on file     Social History Narrative   • No narrative on file         Physical Exam   Constitutional: She is oriented to person, place, and time. She appears well-developed and well-nourished. No distress.   HENT:   Head: Normocephalic.   Nose: Nose normal.   Mouth/Throat: Oropharynx is clear and moist.   Eyes: Pupils are equal, round, and reactive to light. Conjunctivae are normal.   Neck: Normal range of motion. No thyromegaly present.   Cardiovascular: Regular rhythm.  Bradycardia present.    Pulmonary/Chest: Effort normal. No respiratory distress.   Musculoskeletal: She exhibits edema, tenderness and deformity.   Neurological: She is alert and oriented to person, place, and time.   Skin: Skin is warm and dry. There is erythema.    Psychiatric: She has a normal mood and affect. Her behavior is normal. Judgment and thought content normal.   Nursing note and vitals reviewed.        Assessment/Plan    Tamara was seen today for obesity, hypertension and flu vaccine.    Diagnoses and all orders for this visit:    Obstructive sleep apnea syndrome    Secondary polycythemia    Lymphedema    Essential hypertension    Bradycardia  -     ECG 12 Lead; Future    Chronic left-sided low back pain with left-sided sciatica  -     HYDROcodone-acetaminophen (NORCO) 5-325 MG per tablet; Take 1 tablet by mouth Every 4 (Four) Hours As Needed for Moderate Pain  or Severe Pain .    Obesity, morbid, BMI 50 or higher (CMS/HCC)    High risk medication use    Patient is here mostly for chronic back pain under fair control with current pain medication.  In fact would like to decrease to lower dose.  Continues with significant lymphedema in both legs with erythema of anterior shins.  Complains of ankle pain and ask about brace but most likely needs compression dressings.  Noted to have significant bradycardia but unable to get up onto table for EKG.  Will send to hospital for EKG    This note includes information entered using a voice recognition dictation system.  Though reviewed, some nonsensible errors may remain.  .

## 2018-10-17 ENCOUNTER — TRANSCRIBE ORDERS (OUTPATIENT)
Dept: ADMINISTRATIVE | Facility: HOSPITAL | Age: 78
End: 2018-10-17

## 2018-10-17 ENCOUNTER — HOSPITAL ENCOUNTER (OUTPATIENT)
Dept: CARDIOLOGY | Facility: HOSPITAL | Age: 78
Discharge: HOME | End: 2018-10-17
Attending: FAMILY MEDICINE | Admitting: FAMILY MEDICINE

## 2018-10-17 DIAGNOSIS — R00.1 SLOW HEART RATE: Primary | ICD-10-CM

## 2018-10-17 DIAGNOSIS — R00.1 SLOW HEART RATE: ICD-10-CM

## 2018-10-17 PROCEDURE — 93005 ELECTROCARDIOGRAM TRACING: CPT | Performed by: FAMILY MEDICINE

## 2018-10-17 PROCEDURE — 93010 ELECTROCARDIOGRAM REPORT: CPT | Performed by: INTERNAL MEDICINE

## 2018-12-03 RX ORDER — AMLODIPINE BESYLATE AND BENAZEPRIL HYDROCHLORIDE 10; 40 MG/1; MG/1
CAPSULE ORAL
Qty: 90 CAPSULE | Refills: 3 | Status: SHIPPED | OUTPATIENT
Start: 2018-12-03 | End: 2019-11-18 | Stop reason: SDUPTHER

## 2019-01-16 ENCOUNTER — OFFICE VISIT (OUTPATIENT)
Dept: FAMILY MEDICINE CLINIC | Facility: CLINIC | Age: 79
End: 2019-01-16

## 2019-01-16 VITALS
HEIGHT: 62 IN | TEMPERATURE: 97.9 F | DIASTOLIC BLOOD PRESSURE: 70 MMHG | HEART RATE: 60 BPM | OXYGEN SATURATION: 95 % | WEIGHT: 282 LBS | BODY MASS INDEX: 51.89 KG/M2 | SYSTOLIC BLOOD PRESSURE: 140 MMHG

## 2019-01-16 DIAGNOSIS — Z79.899 HIGH RISK MEDICATION USE: ICD-10-CM

## 2019-01-16 DIAGNOSIS — G47.33 OBSTRUCTIVE SLEEP APNEA SYNDROME: ICD-10-CM

## 2019-01-16 DIAGNOSIS — D75.1 SECONDARY POLYCYTHEMIA: ICD-10-CM

## 2019-01-16 DIAGNOSIS — I89.0 LYMPHEDEMA: ICD-10-CM

## 2019-01-16 DIAGNOSIS — F41.9 ANXIETY: ICD-10-CM

## 2019-01-16 DIAGNOSIS — I10 ESSENTIAL HYPERTENSION: ICD-10-CM

## 2019-01-16 DIAGNOSIS — E66.01 OBESITY, MORBID, BMI 50 OR HIGHER (HCC): ICD-10-CM

## 2019-01-16 DIAGNOSIS — M54.16 LUMBAR RADICULOPATHY: Primary | ICD-10-CM

## 2019-01-16 PROCEDURE — 99214 OFFICE O/P EST MOD 30 MIN: CPT | Performed by: FAMILY MEDICINE

## 2019-01-16 RX ORDER — CEPHALEXIN 500 MG/1
500 CAPSULE ORAL 3 TIMES DAILY
Qty: 30 CAPSULE | Refills: 0 | Status: SHIPPED | OUTPATIENT
Start: 2019-01-16 | End: 2019-04-17

## 2019-01-16 RX ORDER — PREDNISONE 10 MG/1
TABLET ORAL
Qty: 24 TABLET | Refills: 0 | Status: SHIPPED | OUTPATIENT
Start: 2019-01-16 | End: 2019-04-17

## 2019-01-16 NOTE — PROGRESS NOTES
HPI  Tamara Singh is a 78 y.o. female who is here for follow up of lymphedema chronic back pain with nerve root involvement and cellulitis of the legs.  Is past due for follow-up with dermatologist.      Review of Systems   Respiratory: Positive for apnea.    Cardiovascular: Positive for leg swelling.   Musculoskeletal: Positive for back pain.   Neurological: Positive for weakness and numbness.         Past Medical History:   Diagnosis Date   • Arthritis    • Hypertension        Past Surgical History:   Procedure Laterality Date   • CHOLECYSTECTOMY  1974       No family history on file.    Social History     Socioeconomic History   • Marital status: Single     Spouse name: Not on file   • Number of children: Not on file   • Years of education: Not on file   • Highest education level: Not on file   Social Needs   • Financial resource strain: Not on file   • Food insecurity - worry: Not on file   • Food insecurity - inability: Not on file   • Transportation needs - medical: Not on file   • Transportation needs - non-medical: Not on file   Occupational History   • Not on file   Tobacco Use   • Smoking status: Never Smoker   • Smokeless tobacco: Never Used   Substance and Sexual Activity   • Alcohol use: No   • Drug use: Yes     Types: Hydrocodone   • Sexual activity: No   Other Topics Concern   • Not on file   Social History Narrative   • Not on file         Physical Exam   Constitutional: She is oriented to person, place, and time. She appears well-developed and well-nourished. No distress.   HENT:   Head: Normocephalic.   Nose: Nose normal.   Mouth/Throat: Oropharynx is clear and moist. No oropharyngeal exudate.   Eyes: Conjunctivae and EOM are normal. Pupils are equal, round, and reactive to light.   Neck: Normal range of motion. Neck supple.   Cardiovascular: Normal rate, regular rhythm and normal heart sounds.   Pulmonary/Chest: Effort normal. No respiratory distress. She has no wheezes. She has no rales.    Abdominal: Soft. She exhibits no distension. There is no tenderness. There is no guarding.   Musculoskeletal: Normal range of motion. She exhibits edema. She exhibits no deformity.   Neurological: She is alert and oriented to person, place, and time. Coordination normal.   Skin: Skin is warm and dry. Rash noted. There is erythema.   Psychiatric: She has a normal mood and affect. Her behavior is normal. Judgment and thought content normal.   Nursing note and vitals reviewed.        Assessment/Plan    Tamara was seen today for back pain and leg pain.    Diagnoses and all orders for this visit:    Lumbar radiculopathy    Secondary polycythemia  -     CBC & Differential    Lymphedema  -     Comprehensive Metabolic Panel    Obesity, morbid, BMI 50 or higher (CMS/HCC)    Obstructive sleep apnea syndrome    Essential hypertension  -     Comprehensive Metabolic Panel    High risk medication use  -     Comprehensive Metabolic Panel    Anxiety    Other orders  -     predniSONE (DELTASONE) 10 MG tablet; 4 stat, then 2 bid x3 days, then 2 qam x4 days  -     cephalexin (KEFLEX) 500 MG capsule; Take 1 capsule by mouth 3 (Three) Times a Day.      Patient here for routine follow-up of above-noted medical problems.  Overall no new complaints.  Continues with lumbar radiculopathy type pain usually relieved with intermittent prednisone therapy.  Also lymphedema both lower extremities with a erythematous rash followed by dermatologist.  Reports improvement with previous antibiotic and prednisone which will be repeated.  Otherwise will get routine lab and follow-up every 6 months.  Continue to encourage weight loss.  Also encourage follow-up with dermatologist.    This note includes information entered using a voice recognition dictation system.  Though reviewed, some nonsensible errors may remain.        Answers for HPI/ROS submitted by the patient on 1/15/2019   Back pain  Chronicity: recurrent  Onset: more than 1 year ago  Frequency:  daily  Progression since onset: waxing and waning  Pain location: sacro-iliac  Pain quality: aching  Radiates to: right thigh  Pain - numeric: 10/10  Pain is: worse during the day  Aggravated by: standing  leg pain: Yes  Risk factors: history of osteoporosis, obesity

## 2019-01-17 LAB
ALBUMIN SERPL-MCNC: 4.6 G/DL (ref 3.5–4.8)
ALBUMIN/GLOB SERPL: 1.2 {RATIO} (ref 1.2–2.2)
ALP SERPL-CCNC: 91 IU/L (ref 39–117)
ALT SERPL-CCNC: 18 IU/L (ref 0–32)
AST SERPL-CCNC: 29 IU/L (ref 0–40)
BASOPHILS # BLD AUTO: 0 X10E3/UL (ref 0–0.2)
BASOPHILS NFR BLD AUTO: 0 %
BILIRUB SERPL-MCNC: 0.9 MG/DL (ref 0–1.2)
BUN SERPL-MCNC: 17 MG/DL (ref 8–27)
BUN/CREAT SERPL: 22 (ref 12–28)
CALCIUM SERPL-MCNC: 10 MG/DL (ref 8.7–10.3)
CHLORIDE SERPL-SCNC: 101 MMOL/L (ref 96–106)
CO2 SERPL-SCNC: 24 MMOL/L (ref 20–29)
CREAT SERPL-MCNC: 0.76 MG/DL (ref 0.57–1)
EOSINOPHIL # BLD AUTO: 0.1 X10E3/UL (ref 0–0.4)
EOSINOPHIL NFR BLD AUTO: 2 %
ERYTHROCYTE [DISTWIDTH] IN BLOOD BY AUTOMATED COUNT: 14.8 % (ref 12.3–15.4)
GLOBULIN SER CALC-MCNC: 3.7 G/DL (ref 1.5–4.5)
GLUCOSE SERPL-MCNC: 102 MG/DL (ref 65–99)
HCT VFR BLD AUTO: 44.3 % (ref 34–46.6)
HGB BLD-MCNC: 15.1 G/DL (ref 11.1–15.9)
IMM GRANULOCYTES # BLD AUTO: 0 X10E3/UL (ref 0–0.1)
IMM GRANULOCYTES NFR BLD AUTO: 0 %
LYMPHOCYTES # BLD AUTO: 3.4 X10E3/UL (ref 0.7–3.1)
LYMPHOCYTES NFR BLD AUTO: 47 %
MCH RBC QN AUTO: 30.8 PG (ref 26.6–33)
MCHC RBC AUTO-ENTMCNC: 34.1 G/DL (ref 31.5–35.7)
MCV RBC AUTO: 90 FL (ref 79–97)
MONOCYTES # BLD AUTO: 0.5 X10E3/UL (ref 0.1–0.9)
MONOCYTES NFR BLD AUTO: 6 %
NEUTROPHILS # BLD AUTO: 3.3 X10E3/UL (ref 1.4–7)
NEUTROPHILS NFR BLD AUTO: 45 %
PLATELET # BLD AUTO: 273 X10E3/UL (ref 150–379)
POTASSIUM SERPL-SCNC: 4.5 MMOL/L (ref 3.5–5.2)
PROT SERPL-MCNC: 8.3 G/DL (ref 6–8.5)
RBC # BLD AUTO: 4.9 X10E6/UL (ref 3.77–5.28)
SODIUM SERPL-SCNC: 143 MMOL/L (ref 134–144)
WBC # BLD AUTO: 7.3 X10E3/UL (ref 3.4–10.8)

## 2019-03-25 ENCOUNTER — APPOINTMENT (OUTPATIENT)
Dept: GENERAL RADIOLOGY | Facility: HOSPITAL | Age: 79
End: 2019-03-25

## 2019-03-25 ENCOUNTER — HOSPITAL ENCOUNTER (EMERGENCY)
Facility: HOSPITAL | Age: 79
Discharge: HOME | End: 2019-03-25
Attending: EMERGENCY MEDICINE | Admitting: EMERGENCY MEDICINE

## 2019-03-25 VITALS
WEIGHT: 274 LBS | DIASTOLIC BLOOD PRESSURE: 60 MMHG | HEART RATE: 77 BPM | TEMPERATURE: 98.7 F | HEIGHT: 62 IN | RESPIRATION RATE: 16 BRPM | OXYGEN SATURATION: 97 % | BODY MASS INDEX: 50.42 KG/M2 | SYSTOLIC BLOOD PRESSURE: 168 MMHG

## 2019-03-25 DIAGNOSIS — M51.36 DEGENERATIVE DISC DISEASE, LUMBAR: Primary | ICD-10-CM

## 2019-03-25 DIAGNOSIS — R39.9 UTI SYMPTOMS: ICD-10-CM

## 2019-03-25 DIAGNOSIS — M51.34 DEGENERATIVE DISC DISEASE, THORACIC: ICD-10-CM

## 2019-03-25 LAB
ALBUMIN SERPL-MCNC: 4 G/DL (ref 3.5–5.2)
ALBUMIN/GLOB SERPL: 1.1 G/DL
ALP SERPL-CCNC: 84 U/L (ref 39–117)
ALT SERPL W P-5'-P-CCNC: 29 U/L (ref 1–33)
ANION GAP SERPL CALCULATED.3IONS-SCNC: 12.8 MMOL/L
AST SERPL-CCNC: 23 U/L (ref 1–32)
BACTERIA UR QL AUTO: ABNORMAL /HPF
BASOPHILS # BLD AUTO: 0.02 10*3/MM3 (ref 0–0.2)
BASOPHILS NFR BLD AUTO: 0.2 % (ref 0–1.5)
BILIRUB SERPL-MCNC: 1.1 MG/DL (ref 0.2–1.2)
BILIRUB UR QL STRIP: NEGATIVE
BUN BLD-MCNC: 14 MG/DL (ref 8–23)
BUN/CREAT SERPL: 17.1 (ref 7–25)
CALCIUM SPEC-SCNC: 9.7 MG/DL (ref 8.6–10.5)
CHLORIDE SERPL-SCNC: 98 MMOL/L (ref 98–107)
CLARITY UR: ABNORMAL
CO2 SERPL-SCNC: 27.2 MMOL/L (ref 22–29)
COLOR UR: YELLOW
CREAT BLD-MCNC: 0.82 MG/DL (ref 0.57–1)
DEPRECATED RDW RBC AUTO: 53.1 FL (ref 37–54)
EOSINOPHIL # BLD AUTO: 0.09 10*3/MM3 (ref 0–0.4)
EOSINOPHIL NFR BLD AUTO: 1.1 % (ref 0.3–6.2)
ERYTHROCYTE [DISTWIDTH] IN BLOOD BY AUTOMATED COUNT: 15.3 % (ref 12.3–15.4)
GFR SERPL CREATININE-BSD FRML MDRD: 67 ML/MIN/1.73
GLOBULIN UR ELPH-MCNC: 3.6 GM/DL
GLUCOSE BLD-MCNC: 107 MG/DL (ref 65–99)
GLUCOSE UR STRIP-MCNC: NEGATIVE MG/DL
HCT VFR BLD AUTO: 46.2 % (ref 34–46.6)
HGB BLD-MCNC: 14.7 G/DL (ref 12–15.9)
HGB UR QL STRIP.AUTO: ABNORMAL
HYALINE CASTS UR QL AUTO: ABNORMAL /LPF
IMM GRANULOCYTES # BLD AUTO: 0.02 10*3/MM3 (ref 0–0.05)
IMM GRANULOCYTES NFR BLD AUTO: 0.2 % (ref 0–0.5)
KETONES UR QL STRIP: NEGATIVE
LEUKOCYTE ESTERASE UR QL STRIP.AUTO: ABNORMAL
LYMPHOCYTES # BLD AUTO: 1.52 10*3/MM3 (ref 0.7–3.1)
LYMPHOCYTES NFR BLD AUTO: 19 % (ref 19.6–45.3)
MCH RBC QN AUTO: 29.8 PG (ref 26.6–33)
MCHC RBC AUTO-ENTMCNC: 31.8 G/DL (ref 31.5–35.7)
MCV RBC AUTO: 93.5 FL (ref 79–97)
MONOCYTES # BLD AUTO: 0.76 10*3/MM3 (ref 0.1–0.9)
MONOCYTES NFR BLD AUTO: 9.5 % (ref 5–12)
NEUTROPHILS # BLD AUTO: 5.6 10*3/MM3 (ref 1.4–7)
NEUTROPHILS NFR BLD AUTO: 70 % (ref 42.7–76)
NITRITE UR QL STRIP: NEGATIVE
NRBC BLD AUTO-RTO: 0 /100 WBC (ref 0–0)
PH UR STRIP.AUTO: 8.5 [PH] (ref 5–8)
PLATELET # BLD AUTO: 243 10*3/MM3 (ref 140–450)
PMV BLD AUTO: 9.1 FL (ref 6–12)
POTASSIUM BLD-SCNC: 4.3 MMOL/L (ref 3.5–5.2)
PROT SERPL-MCNC: 7.6 G/DL (ref 6–8.5)
PROT UR QL STRIP: NEGATIVE
RBC # BLD AUTO: 4.94 10*6/MM3 (ref 3.77–5.28)
RBC # UR: ABNORMAL /HPF
REF LAB TEST METHOD: ABNORMAL
SODIUM BLD-SCNC: 138 MMOL/L (ref 136–145)
SP GR UR STRIP: 1.01 (ref 1–1.03)
SQUAMOUS #/AREA URNS HPF: ABNORMAL /HPF
UROBILINOGEN UR QL STRIP: ABNORMAL
WBC CLUMPS # UR AUTO: ABNORMAL /HPF
WBC NRBC COR # BLD: 8.01 10*3/MM3 (ref 3.4–10.8)
WBC UR QL AUTO: ABNORMAL /HPF

## 2019-03-25 PROCEDURE — 96374 THER/PROPH/DIAG INJ IV PUSH: CPT

## 2019-03-25 PROCEDURE — 99284 EMERGENCY DEPT VISIT MOD MDM: CPT

## 2019-03-25 PROCEDURE — 80053 COMPREHEN METABOLIC PANEL: CPT | Performed by: EMERGENCY MEDICINE

## 2019-03-25 PROCEDURE — 81001 URINALYSIS AUTO W/SCOPE: CPT | Performed by: EMERGENCY MEDICINE

## 2019-03-25 PROCEDURE — P9612 CATHETERIZE FOR URINE SPEC: HCPCS

## 2019-03-25 PROCEDURE — 87086 URINE CULTURE/COLONY COUNT: CPT | Performed by: EMERGENCY MEDICINE

## 2019-03-25 PROCEDURE — 72072 X-RAY EXAM THORAC SPINE 3VWS: CPT

## 2019-03-25 PROCEDURE — 25010000002 KETOROLAC TROMETHAMINE PER 15 MG: Performed by: EMERGENCY MEDICINE

## 2019-03-25 PROCEDURE — 85025 COMPLETE CBC W/AUTO DIFF WBC: CPT | Performed by: EMERGENCY MEDICINE

## 2019-03-25 PROCEDURE — 72110 X-RAY EXAM L-2 SPINE 4/>VWS: CPT

## 2019-03-25 RX ORDER — KETOROLAC TROMETHAMINE 15 MG/ML
15 INJECTION, SOLUTION INTRAMUSCULAR; INTRAVENOUS ONCE
Status: COMPLETED | OUTPATIENT
Start: 2019-03-25 | End: 2019-03-25

## 2019-03-25 RX ORDER — SODIUM CHLORIDE 0.9 % (FLUSH) 0.9 %
10 SYRINGE (ML) INJECTION AS NEEDED
Status: DISCONTINUED | OUTPATIENT
Start: 2019-03-25 | End: 2019-03-25 | Stop reason: HOSPADM

## 2019-03-25 RX ORDER — HYDROCODONE BITARTRATE AND ACETAMINOPHEN 7.5; 325 MG/1; MG/1
1 TABLET ORAL ONCE
Status: COMPLETED | OUTPATIENT
Start: 2019-03-25 | End: 2019-03-25

## 2019-03-25 RX ORDER — SULFAMETHOXAZOLE AND TRIMETHOPRIM 800; 160 MG/1; MG/1
1 TABLET ORAL 2 TIMES DAILY
Qty: 10 TABLET | Refills: 0 | Status: SHIPPED | OUTPATIENT
Start: 2019-03-25 | End: 2019-04-17

## 2019-03-25 RX ADMIN — KETOROLAC TROMETHAMINE 15 MG: 15 INJECTION, SOLUTION INTRAMUSCULAR; INTRAVENOUS at 16:25

## 2019-03-25 RX ADMIN — HYDROCODONE BITARTRATE AND ACETAMINOPHEN 1 TABLET: 7.5; 325 TABLET ORAL at 16:25

## 2019-03-26 LAB — BACTERIA SPEC AEROBE CULT: NO GROWTH

## 2019-04-17 ENCOUNTER — OFFICE VISIT (OUTPATIENT)
Dept: FAMILY MEDICINE CLINIC | Facility: CLINIC | Age: 79
End: 2019-04-17

## 2019-04-17 VITALS
WEIGHT: 276.2 LBS | SYSTOLIC BLOOD PRESSURE: 110 MMHG | RESPIRATION RATE: 16 BRPM | DIASTOLIC BLOOD PRESSURE: 60 MMHG | HEART RATE: 62 BPM | BODY MASS INDEX: 50.83 KG/M2 | OXYGEN SATURATION: 94 % | HEIGHT: 62 IN

## 2019-04-17 DIAGNOSIS — G89.29 CHRONIC LEFT-SIDED LOW BACK PAIN WITH LEFT-SIDED SCIATICA: ICD-10-CM

## 2019-04-17 DIAGNOSIS — M54.42 CHRONIC LEFT-SIDED LOW BACK PAIN WITH LEFT-SIDED SCIATICA: ICD-10-CM

## 2019-04-17 DIAGNOSIS — M54.6 PAIN IN THORACIC SPINE: ICD-10-CM

## 2019-04-17 DIAGNOSIS — I89.0 LYMPHEDEMA: ICD-10-CM

## 2019-04-17 DIAGNOSIS — E66.01 OBESITY, MORBID, BMI 50 OR HIGHER (HCC): ICD-10-CM

## 2019-04-17 DIAGNOSIS — I10 ESSENTIAL HYPERTENSION: Primary | ICD-10-CM

## 2019-04-17 DIAGNOSIS — G47.33 OBSTRUCTIVE SLEEP APNEA SYNDROME: ICD-10-CM

## 2019-04-17 DIAGNOSIS — M54.16 LUMBAR RADICULOPATHY: ICD-10-CM

## 2019-04-17 DIAGNOSIS — M15.9 GENERALIZED OSTEOARTHRITIS: ICD-10-CM

## 2019-04-17 DIAGNOSIS — E53.8 COBALAMIN DEFICIENCY: ICD-10-CM

## 2019-04-17 PROCEDURE — 99213 OFFICE O/P EST LOW 20 MIN: CPT | Performed by: FAMILY MEDICINE

## 2019-04-17 RX ORDER — KETOROLAC TROMETHAMINE 5 MG/ML
SOLUTION OPHTHALMIC
Refills: 3 | COMMUNITY
Start: 2019-03-13

## 2019-04-17 RX ORDER — CARISOPRODOL 350 MG/1
350 TABLET ORAL 4 TIMES DAILY PRN
Qty: 120 TABLET | Refills: 5 | Status: SHIPPED | OUTPATIENT
Start: 2019-04-17 | End: 2019-10-29 | Stop reason: SDUPTHER

## 2019-04-17 RX ORDER — HYDROCODONE BITARTRATE AND ACETAMINOPHEN 5; 325 MG/1; MG/1
1 TABLET ORAL EVERY 4 HOURS PRN
Qty: 120 TABLET | Refills: 0 | Status: SHIPPED | OUTPATIENT
Start: 2019-04-17 | End: 2019-06-21 | Stop reason: SDUPTHER

## 2019-04-17 RX ORDER — METHYLPREDNISOLONE 4 MG/1
TABLET ORAL
Qty: 21 TABLET | Refills: 0 | Status: SHIPPED | OUTPATIENT
Start: 2019-04-17 | End: 2019-06-20

## 2019-04-17 NOTE — PROGRESS NOTES
HPI  Tamara Singh is a 79 y.o. female who is here for follow up of chronic back pain and morbid obesity.  Was recently in the emergency room for severe low back pain across the entire back.  Was also found to have a urinary tract infection and symptoms improved after antibiotic despite the fact that culture had no growth?  Urinary symptoms seem to have resolved.  Reports better relief of her low back pain using her brothers Soma and requests changing from Flexeril to Soma.      Review of Systems   Constitutional: Negative for chills, diaphoresis and fever.   Cardiovascular: Negative for chest pain.   Gastrointestinal: Negative for abdominal pain.   Genitourinary: Negative for dysuria and pelvic pain.   Musculoskeletal: Positive for back pain.   Neurological: Positive for weakness and numbness. Negative for headaches.         Past Medical History:   Diagnosis Date   • Arthritis    • Hypertension        Past Surgical History:   Procedure Laterality Date   • CHOLECYSTECTOMY  1974       History reviewed. No pertinent family history.    Social History     Socioeconomic History   • Marital status: Single     Spouse name: Not on file   • Number of children: Not on file   • Years of education: Not on file   • Highest education level: Not on file   Tobacco Use   • Smoking status: Never Smoker   • Smokeless tobacco: Never Used   Substance and Sexual Activity   • Alcohol use: No   • Drug use: No     Types: Hydrocodone   • Sexual activity: No         Physical Exam   Constitutional: She is oriented to person, place, and time. She appears well-developed and well-nourished. No distress.   HENT:   Head: Normocephalic.   Eyes: Conjunctivae and EOM are normal. Pupils are equal, round, and reactive to light.   Cardiovascular: Normal rate and regular rhythm.   Pulmonary/Chest: Effort normal. No respiratory distress.   Abdominal: Soft. She exhibits no distension. There is no tenderness.   Musculoskeletal: She exhibits edema and  deformity.   Neurological: She is alert and oriented to person, place, and time. She exhibits normal muscle tone. Coordination normal.   Skin: Skin is warm and dry.   Psychiatric: She has a normal mood and affect. Her behavior is normal. Judgment and thought content normal.   Nursing note and vitals reviewed.        Assessment/Plan    Tamara was seen today for hypertension, obesity, sleep apnea and back pain.    Diagnoses and all orders for this visit:    Essential hypertension    Chronic left-sided low back pain with left-sided sciatica  -     HYDROcodone-acetaminophen (NORCO) 5-325 MG per tablet; Take 1 tablet by mouth Every 4 (Four) Hours As Needed for Moderate Pain  or Severe Pain .    Obesity, morbid, BMI 50 or higher (CMS/HCC)    Pain in thoracic spine    Lumbar radiculopathy  -     carisoprodol (SOMA) 350 MG tablet; Take 1 tablet by mouth 4 (Four) Times a Day As Needed for Muscle Spasms.  -     methylPREDNISolone (MEDROL) 4 MG tablet; follow package directions    Cobalamin deficiency    Lymphedema    Generalized osteoarthritis    Obstructive sleep apnea syndrome    Patient here for follow-up of multiple medical problems some of which are noted above.  Recently in the emergency room with severe back pain and also treated for urinary tract infection.  Lab work reviewed and basically unremarkable.  Overall status seems fairly stable though blood pressure is somewhat lower than normal?  Request trying a different muscle relaxer and discussed warnings especially regarding oversedation etc.  Will monitor closely including follow-up visits every 3 months.  There is no evidence of abuse nor misuse of controlled medications.    This note includes information entered using a voice recognition dictation system.  Though reviewed, some nonsensible errors may remain.              Answers for HPI/ROS submitted by the patient on 4/15/2019   Back pain  Chronicity: chronic  Onset: 1 to 4 weeks ago  Frequency:  constantly  Progression since onset: waxing and waning  Pain location: lumbar spine, thoracic spine  Pain quality: stabbing  Radiates to: right thigh  Pain - numeric: 10/10  Pain is: the same all the time  Aggravated by: position  Stiffness is present: in the morning  bowel incontinence: No  leg pain: Yes  paresis: No  paresthesias: Yes  perianal numbness: No  tingling: No  weight loss: No  Risk factors: history of cancer, history of osteoporosis, lack of exercise, obesity, sedentary lifestyle

## 2019-04-18 ENCOUNTER — TELEPHONE (OUTPATIENT)
Dept: FAMILY MEDICINE CLINIC | Facility: CLINIC | Age: 79
End: 2019-04-18

## 2019-04-18 NOTE — TELEPHONE ENCOUNTER
----- Message from Rita Butler sent at 4/18/2019 11:33 AM EDT -----  PT NEEDS A PA FOR:  #HYDROCODONE 5/325 #120  RYAN [id number]-MEDICARE PLAN  PHONE [phone]    PT'S ID#[id number]  PHARM#[phone] [facility]

## 2019-04-18 NOTE — TELEPHONE ENCOUNTER
PA for SOMA needed. Patient's insurance will not pay for it they want her to try other muscle relaxer's.  Told her I would start PA process on the SOMA.    Thank you.    ----- Message from Yane Ruvalcaba sent at 4/18/2019 10:30 AM EDT -----  [phone]     Calling about the hydrocodone refill - has a question

## 2019-06-20 ENCOUNTER — OFFICE VISIT (OUTPATIENT)
Dept: FAMILY MEDICINE CLINIC | Facility: CLINIC | Age: 79
End: 2019-06-20

## 2019-06-20 ENCOUNTER — TELEPHONE (OUTPATIENT)
Dept: FAMILY MEDICINE CLINIC | Facility: CLINIC | Age: 79
End: 2019-06-20

## 2019-06-20 ENCOUNTER — HOSPITAL ENCOUNTER (OUTPATIENT)
Dept: GENERAL RADIOLOGY | Facility: HOSPITAL | Age: 79
Discharge: HOME | End: 2019-06-20
Admitting: FAMILY MEDICINE

## 2019-06-20 VITALS
TEMPERATURE: 89.2 F | RESPIRATION RATE: 16 BRPM | BODY MASS INDEX: 50.79 KG/M2 | WEIGHT: 276 LBS | HEART RATE: 75 BPM | OXYGEN SATURATION: 93 % | HEIGHT: 62 IN

## 2019-06-20 DIAGNOSIS — Z87.898 HX OF MULTIPLE PULMONARY NODULES: ICD-10-CM

## 2019-06-20 DIAGNOSIS — M54.14 THORACIC RADICULOPATHY: Primary | ICD-10-CM

## 2019-06-20 DIAGNOSIS — M54.14 THORACIC RADICULOPATHY: ICD-10-CM

## 2019-06-20 DIAGNOSIS — R39.9 UTI SYMPTOMS: ICD-10-CM

## 2019-06-20 LAB
BILIRUB BLD-MCNC: NEGATIVE MG/DL
CLARITY, POC: CLEAR
COLOR UR: YELLOW
GLUCOSE UR STRIP-MCNC: NEGATIVE MG/DL
KETONES UR QL: NEGATIVE
LEUKOCYTE EST, POC: NEGATIVE
NITRITE UR-MCNC: NEGATIVE MG/ML
PH UR: 5.5 [PH] (ref 5–8)
PROT UR STRIP-MCNC: NEGATIVE MG/DL
RBC # UR STRIP: NEGATIVE /UL
SP GR UR: 1.01 (ref 1–1.03)
UROBILINOGEN UR QL: NORMAL

## 2019-06-20 PROCEDURE — 81003 URINALYSIS AUTO W/O SCOPE: CPT | Performed by: FAMILY MEDICINE

## 2019-06-20 PROCEDURE — 99214 OFFICE O/P EST MOD 30 MIN: CPT | Performed by: FAMILY MEDICINE

## 2019-06-20 PROCEDURE — 71046 X-RAY EXAM CHEST 2 VIEWS: CPT

## 2019-06-20 RX ORDER — METHYLPREDNISOLONE 4 MG/1
TABLET ORAL
Qty: 21 TABLET | Refills: 0 | Status: SHIPPED | OUTPATIENT
Start: 2019-06-20 | End: 2019-07-19

## 2019-06-20 RX ORDER — BETAMETHASONE DIPROPIONATE 0.05 %
OINTMENT (GRAM) TOPICAL
Refills: 2 | COMMUNITY
Start: 2019-05-22 | End: 2019-07-19

## 2019-06-20 NOTE — TELEPHONE ENCOUNTER
NO NARC AGREEMENT FORM IN CHART.    Future O.V. 07/19/2019.    Last O.V. 06/20/2019.  Angel 07/17/2018 (UPDATED).  Filled     Thank you.    ----- Message from Rita Butler sent at 6/20/2019  2:17 PM EDT -----  PT NEEDS A REFILL  #HYDROCODONE 5/32/#120  PHARM#[phone] [facility]

## 2019-06-20 NOTE — PROGRESS NOTES
HPI  Tamara Singh is a 79 y.o. female who is here for severe left posterior chest wall pain that starts in the middle and goes all the way around.  Reviewing records patient has been having this pain for some time.  In fact was seen in emergency room 3 months ago and had multiple x-rays etc.  Several years ago had CT scan of the chest because of multiple nodules and was found to have a probable AV malformation.  Patient is requesting CAT scans and MRIs etc. because of severity of pain which continues to worsen.  Of interest does usually get relief with steroid tablets, reports most recently received in March or April.  Also concerned about kidney.      Review of Systems   Constitutional: Positive for chills. Negative for diaphoresis and fever.   Musculoskeletal: Positive for arthralgias and back pain.   All other systems reviewed and are negative.        Past Medical History:   Diagnosis Date   • Arthritis    • Hypertension        Past Surgical History:   Procedure Laterality Date   • CHOLECYSTECTOMY  1974       History reviewed. No pertinent family history.    Social History     Socioeconomic History   • Marital status: Single     Spouse name: Not on file   • Number of children: Not on file   • Years of education: Not on file   • Highest education level: Not on file   Tobacco Use   • Smoking status: Never Smoker   • Smokeless tobacco: Never Used   Substance and Sexual Activity   • Alcohol use: No   • Drug use: No     Types: Hydrocodone   • Sexual activity: No         Physical Exam   Constitutional: She appears well-developed and well-nourished. She appears distressed.   Thermometer registers at 89 which is obviously an error.  Patient's neck is warm to touch.  Needs to be verified with a real thermometer but apparently not available?   HENT:   Head: Normocephalic and atraumatic.   Eyes: Conjunctivae and EOM are normal. Pupils are equal, round, and reactive to light.   Neck: Normal range of motion. No thyromegaly  present.   Cardiovascular: Normal rate, regular rhythm and normal heart sounds.   Pulmonary/Chest: Effort normal and breath sounds normal.   Abdominal: Soft.   Musculoskeletal: She exhibits tenderness. She exhibits no edema or deformity.        Thoracic back: She exhibits tenderness and pain. She exhibits no deformity.        Back:    Skin: Skin is warm and dry. No rash noted.   Psychiatric: She has a normal mood and affect. Her behavior is normal. Judgment and thought content normal.   Nursing note and vitals reviewed.        Assessment/Plan    Tamara was seen today for back pain.    Diagnoses and all orders for this visit:    Thoracic radiculopathy  -     CBC & Differential  -     Sedimentation Rate  -     C-reactive Protein  -     XR Chest PA & Lateral; Future  -     methylPREDNISolone (MEDROL) 4 MG tablet; follow package directions    Hx of multiple pulmonary nodules  -     XR Chest PA & Lateral; Future        Patient presents with worsening back pain in the thoracic region.  Goes to the left and in fact reports pain goes all the way around to the front at times.  Patient is having worsening mobility problems.  Usually gets relief with Medrol Dosepak which will be given.  However in view of worsening status will get repeat chest x-ray with no nodules and AV malformation.  Get tests for inflammation.  Of note urinalysis done in the office is normal.  If lab tests all unremarkable most likely will need to schedule MRI of thoracic spine.    This note includes information entered using a voice recognition dictation system.  Though reviewed, some nonsensible errors may remain.

## 2019-06-21 DIAGNOSIS — Q27.30 AVM (ARTERIOVENOUS MALFORMATION): Primary | ICD-10-CM

## 2019-06-21 DIAGNOSIS — M54.42 CHRONIC LEFT-SIDED LOW BACK PAIN WITH LEFT-SIDED SCIATICA: ICD-10-CM

## 2019-06-21 DIAGNOSIS — R07.89 POSTERIOR CHEST PAIN: ICD-10-CM

## 2019-06-21 DIAGNOSIS — G89.29 CHRONIC LEFT-SIDED LOW BACK PAIN WITH LEFT-SIDED SCIATICA: ICD-10-CM

## 2019-06-21 LAB
BASOPHILS # BLD AUTO: 0.03 10*3/MM3 (ref 0–0.2)
BASOPHILS NFR BLD AUTO: 0.7 % (ref 0–1.5)
CRP SERPL-MCNC: 1.11 MG/DL (ref 0–0.5)
EOSINOPHIL # BLD AUTO: 0.12 10*3/MM3 (ref 0–0.4)
EOSINOPHIL NFR BLD AUTO: 2.6 % (ref 0.3–6.2)
ERYTHROCYTE [DISTWIDTH] IN BLOOD BY AUTOMATED COUNT: 16.2 % (ref 12.3–15.4)
ERYTHROCYTE [SEDIMENTATION RATE] IN BLOOD BY WESTERGREN METHOD: 29 MM/HR (ref 0–30)
HCT VFR BLD AUTO: 45.1 % (ref 34–46.6)
HGB BLD-MCNC: 14.3 G/DL (ref 12–15.9)
IMM GRANULOCYTES # BLD AUTO: 0.01 10*3/MM3 (ref 0–0.05)
IMM GRANULOCYTES NFR BLD AUTO: 0.2 % (ref 0–0.5)
LYMPHOCYTES # BLD AUTO: 1.56 10*3/MM3 (ref 0.7–3.1)
LYMPHOCYTES NFR BLD AUTO: 34.4 % (ref 19.6–45.3)
MCH RBC QN AUTO: 29.1 PG (ref 26.6–33)
MCHC RBC AUTO-ENTMCNC: 31.7 G/DL (ref 31.5–35.7)
MCV RBC AUTO: 91.7 FL (ref 79–97)
MONOCYTES # BLD AUTO: 0.44 10*3/MM3 (ref 0.1–0.9)
MONOCYTES NFR BLD AUTO: 9.7 % (ref 5–12)
NEUTROPHILS # BLD AUTO: 2.37 10*3/MM3 (ref 1.7–7)
NEUTROPHILS NFR BLD AUTO: 52.4 % (ref 42.7–76)
NRBC BLD AUTO-RTO: 0 /100 WBC (ref 0–0.2)
PLATELET # BLD AUTO: 268 10*3/MM3 (ref 140–450)
RBC # BLD AUTO: 4.92 10*6/MM3 (ref 3.77–5.28)
WBC # BLD AUTO: 4.53 10*3/MM3 (ref 3.4–10.8)

## 2019-06-21 RX ORDER — HYDROCODONE BITARTRATE AND ACETAMINOPHEN 5; 325 MG/1; MG/1
1 TABLET ORAL EVERY 4 HOURS PRN
Qty: 120 TABLET | Refills: 0 | Status: SHIPPED | OUTPATIENT
Start: 2019-06-21 | End: 2019-07-19 | Stop reason: SDUPTHER

## 2019-06-24 RX ORDER — FUROSEMIDE 40 MG/1
TABLET ORAL
Qty: 90 TABLET | Refills: 3 | Status: SHIPPED | OUTPATIENT
Start: 2019-06-24 | End: 2020-04-14 | Stop reason: CLARIF

## 2019-06-28 ENCOUNTER — HOSPITAL ENCOUNTER (OUTPATIENT)
Dept: CT IMAGING | Facility: HOSPITAL | Age: 79
Discharge: HOME | End: 2019-06-28
Admitting: FAMILY MEDICINE

## 2019-06-28 DIAGNOSIS — Q27.30 AVM (ARTERIOVENOUS MALFORMATION): ICD-10-CM

## 2019-06-28 DIAGNOSIS — R07.89 POSTERIOR CHEST PAIN: ICD-10-CM

## 2019-06-28 LAB — CREAT BLDA-MCNC: 0.9 MG/DL (ref 0.6–1.3)

## 2019-06-28 PROCEDURE — 71275 CT ANGIOGRAPHY CHEST: CPT

## 2019-06-28 PROCEDURE — 0 IOPAMIDOL PER 1 ML: Performed by: FAMILY MEDICINE

## 2019-06-28 PROCEDURE — 82565 ASSAY OF CREATININE: CPT

## 2019-06-28 RX ADMIN — IOPAMIDOL 95 ML: 755 INJECTION, SOLUTION INTRAVENOUS at 11:19

## 2019-07-03 DIAGNOSIS — R93.89 ABNORMAL CHEST CT: Primary | ICD-10-CM

## 2019-07-03 DIAGNOSIS — Q27.30 AVM (ARTERIOVENOUS MALFORMATION): ICD-10-CM

## 2019-07-19 ENCOUNTER — OFFICE VISIT (OUTPATIENT)
Dept: FAMILY MEDICINE CLINIC | Facility: CLINIC | Age: 79
End: 2019-07-19

## 2019-07-19 VITALS
TEMPERATURE: 97.6 F | HEART RATE: 60 BPM | DIASTOLIC BLOOD PRESSURE: 62 MMHG | BODY MASS INDEX: 48.58 KG/M2 | HEIGHT: 62 IN | OXYGEN SATURATION: 96 % | SYSTOLIC BLOOD PRESSURE: 118 MMHG | WEIGHT: 264 LBS

## 2019-07-19 DIAGNOSIS — M54.16 LUMBAR RADICULOPATHY: ICD-10-CM

## 2019-07-19 DIAGNOSIS — I10 ESSENTIAL HYPERTENSION: ICD-10-CM

## 2019-07-19 DIAGNOSIS — G89.29 CHRONIC LEFT-SIDED LOW BACK PAIN WITH LEFT-SIDED SCIATICA: ICD-10-CM

## 2019-07-19 DIAGNOSIS — M54.42 CHRONIC LEFT-SIDED LOW BACK PAIN WITH LEFT-SIDED SCIATICA: ICD-10-CM

## 2019-07-19 DIAGNOSIS — E66.01 OBESITY, MORBID, BMI 50 OR HIGHER (HCC): Primary | ICD-10-CM

## 2019-07-19 PROCEDURE — 99213 OFFICE O/P EST LOW 20 MIN: CPT | Performed by: FAMILY MEDICINE

## 2019-07-19 RX ORDER — HYDROCODONE BITARTRATE AND ACETAMINOPHEN 5; 325 MG/1; MG/1
1 TABLET ORAL EVERY 4 HOURS PRN
Qty: 120 TABLET | Refills: 0 | Status: SHIPPED | OUTPATIENT
Start: 2019-07-19 | End: 2019-08-22 | Stop reason: SDUPTHER

## 2019-07-19 NOTE — PROGRESS NOTES
HPI  Tamara Singh is a 79 y.o. female who is here for follow up of chronic back pain at times going down the back of her leg.  Also recently had chest wall pain and CT scan revealed radiation.  Does have appointment with thoracic surgeon later this month for further evaluation?  Because of worst pain patient actually has lost some weight which is strongly encouraged.      Review of Systems   Constitutional: Negative for fever.   Cardiovascular: Negative for chest pain.   Gastrointestinal: Negative for abdominal pain.   Genitourinary: Negative for dysuria and pelvic pain.   Musculoskeletal: Positive for back pain.   Neurological: Positive for numbness. Negative for weakness and headaches.         Past Medical History:   Diagnosis Date   • Arthritis    • Hypertension        Past Surgical History:   Procedure Laterality Date   • CHOLECYSTECTOMY  1974       No family history on file.    Social History     Socioeconomic History   • Marital status: Single     Spouse name: Not on file   • Number of children: Not on file   • Years of education: Not on file   • Highest education level: Not on file   Tobacco Use   • Smoking status: Never Smoker   • Smokeless tobacco: Never Used   Substance and Sexual Activity   • Alcohol use: No   • Drug use: No     Types: Hydrocodone   • Sexual activity: No         Physical Exam   Constitutional: She is oriented to person, place, and time. She appears well-developed and well-nourished. No distress.   HENT:   Head: Normocephalic.   Eyes: Conjunctivae are normal. Pupils are equal, round, and reactive to light.   Neck: Normal range of motion. No thyromegaly present.   Cardiovascular: Normal rate and regular rhythm.   Pulmonary/Chest: Effort normal and breath sounds normal.   Abdominal: Soft. She exhibits no distension. There is no guarding.   Musculoskeletal:   Ambulates with difficulty related to morbid obesity and back pain   Neurological: She is alert and oriented to person, place, and time.    Skin: Skin is warm and dry.   Psychiatric: She has a normal mood and affect. Her behavior is normal. Judgment and thought content normal.   Nursing note and vitals reviewed.        Assessment/Plan    Tamara was seen today for back pain.    Diagnoses and all orders for this visit:    Obesity, morbid, BMI 50 or higher (CMS/HCC)    Chronic left-sided low back pain with left-sided sciatica  -     HYDROcodone-acetaminophen (NORCO) 5-325 MG per tablet; Take 1 tablet by mouth Every 4 (Four) Hours As Needed for Moderate Pain  or Severe Pain .    Essential hypertension    Lumbar radiculopathy      Patient here for routine follow-up of above-noted medical problems.  Continues with back pain.  Functional on current pain medications.  Will be seen by thoracic surgeon later this month as discussed above.  Otherwise continue current therapy with close monitoring every 3 months.  There is no evidence of abuse nor misuse of pain medications which will be continued.  Also recheck routine lab next appointment in 3 months.    This note includes information entered using a voice recognition dictation system.  Though reviewed, some nonsensible errors may remain.        Answers for HPI/ROS submitted by the patient on 7/15/2019   Back pain  Chronicity: recurrent  Onset: more than 1 month ago  Frequency: daily  Progression since onset: waxing and waning  Pain location: lumbar spine, sacro-iliac  Pain quality: stabbing  Radiates to: right thigh  Pain - numeric: 10/10  Pain is: worse during the day  Aggravated by: standing  bladder incontinence: Yes  bowel incontinence: No  leg pain: Yes  paresis: No  paresthesias: No  perianal numbness: No  tingling: No  weight loss: No  Risk factors: history of osteoporosis, history of steroid use, lack of exercise, obesity, sedentary lifestyle

## 2019-07-30 ENCOUNTER — OFFICE VISIT (OUTPATIENT)
Dept: SURGERY | Facility: CLINIC | Age: 79
End: 2019-07-30

## 2019-07-30 VITALS
SYSTOLIC BLOOD PRESSURE: 149 MMHG | WEIGHT: 262 LBS | DIASTOLIC BLOOD PRESSURE: 84 MMHG | HEIGHT: 62 IN | HEART RATE: 76 BPM | OXYGEN SATURATION: 91 % | BODY MASS INDEX: 48.21 KG/M2

## 2019-07-30 DIAGNOSIS — Q27.30 ARTERIOVENOUS MALFORMATION: Primary | ICD-10-CM

## 2019-07-30 PROCEDURE — 99204 OFFICE O/P NEW MOD 45 MIN: CPT | Performed by: THORACIC SURGERY (CARDIOTHORACIC VASCULAR SURGERY)

## 2019-07-30 NOTE — PROGRESS NOTES
Subjective   Patient ID: Tamara Singh is a 79 y.o. female is being seen for consultation today at the request of Dawson Sauceda MD    History of Present Illness  Dear Colleague,  Tamara Singh was seen in our office today for evaluation and treatment of a left lung AV malformation.  This was first identified in 2014.  She recently had severe upper back pain beginning just below the left scapula radiating to the right posterior chest wall.  This was not associated with shortness of breath.  This was not associated with cough or hemoptysis.  This was not pleuritic in nature.  The pain also radiated down the spine and into the legs.  She has had chronic back pain for over 20 years.  CT of the chest was obtained which showed an AV malformation in the left lung.  This was first identified on a CT scan in 2014.  It is slightly larger on the current scan.    She is a non-smoker.  She has had significant secondhand smoke exposure.  She has a long-standing history of asthma.  This is well controlled.  She has lost 30 pounds over the last 1 year intentionally.  She has no history of cancer.    The following portions of the patient's history were reviewed and updated as appropriate: allergies, current medications, past family history, past medical history, past social history, past surgical history and problem list.  Review of Systems   Constitution: Negative.   HENT: Negative.    Eyes: Negative.    Cardiovascular: Negative.    Respiratory: Negative.    Endocrine: Negative.    Hematologic/Lymphatic: Negative.    Skin: Negative.    Musculoskeletal: Positive for back pain.   Gastrointestinal: Negative.    Genitourinary: Negative.    Neurological: Negative.    Psychiatric/Behavioral: Negative.    Allergic/Immunologic: Negative.      Patient Active Problem List   Diagnosis   • Lumbar radiculopathy   • Anxiety   • Secondary polycythemia   • Arteriovenous malformation   • Venous stasis dermatitis   • Eczema   • Hypertension   •  Hypoglycemia   • Knee pain   • Low back pain   • Lymphedema   • Obesity, morbid, BMI 50 or higher (CMS/HCC)   • Obstructive sleep apnea syndrome   • Pain in thoracic spine   • Varicose veins of lower extremity with inflammation   • Cobalamin deficiency   • Vitamin D deficiency   • Muscle spasms of both lower extremities   • Generalized osteoarthritis     Past Medical History:   Diagnosis Date   • Arthritis    • Hypertension      Past Surgical History:   Procedure Laterality Date   • CHOLECYSTECTOMY  1974     History reviewed. No pertinent family history.  Social History     Socioeconomic History   • Marital status: Single     Spouse name: Not on file   • Number of children: Not on file   • Years of education: Not on file   • Highest education level: Not on file   Tobacco Use   • Smoking status: Never Smoker   • Smokeless tobacco: Never Used   Substance and Sexual Activity   • Alcohol use: No   • Drug use: No     Types: Hydrocodone   • Sexual activity: No       Current Outpatient Medications:   •  amLODIPine-benazepril (LOTREL) 10-40 MG per capsule, TAKE ONE CAPSULE BY MOUTH EVERY DAY, Disp: 90 capsule, Rfl: 3  •  carisoprodol (SOMA) 350 MG tablet, Take 1 tablet by mouth 4 (Four) Times a Day As Needed for Muscle Spasms., Disp: 120 tablet, Rfl: 5  •  Cholecalciferol (VITAMIN D3) 1000 units capsule, Take 1,000 Units by mouth Daily. TAKE 1 CAPSULE BY MOUTH DAILY, Disp: 100 capsule, Rfl: 3  •  Dermatological Products, Misc. (NIVATOPIC PLUS) cream, APPLY SPARINGLY TWO TIMES PER DAY TO THE LOWER LEGS, Disp: , Rfl: 4  •  HYDROcodone-acetaminophen (NORCO) 5-325 MG per tablet, Take 1 tablet by mouth Every 4 (Four) Hours As Needed for Moderate Pain  or Severe Pain ., Disp: 120 tablet, Rfl: 0  •  ketorolac (ACULAR) 0.5 % ophthalmic solution, INSTILL 1 DROP INTO LEFT EYE 4 TIMES A DAY, Disp: , Rfl: 3  •  LASIX 40 MG tablet, TAKE 1 TABLETBY MOUTH EVERY MORNING, Disp: 90 tablet, Rfl: 3  •  vitamin B-12 (CYANOCOBALAMIN) 1000 MCG  tablet, Take 1 tablet by mouth Daily., Disp: 100 tablet, Rfl: 3  •  PROAIR  (90 BASE) MCG/ACT inhaler, INHALE 2 PUFFS BY MOUTH 4 TIMES DAILY AS NEEDED, Disp: 1 inhaler, Rfl: 5  Allergies   Allergen Reactions   • Moxifloxacin    • Penicillins         Objective   Vitals:    07/30/19 1000   BP: 149/84   Pulse: 76   SpO2: 91%     Physical Exam   Constitutional: She is oriented to person, place, and time. She appears well-developed and well-nourished.   Morbidly obese   HENT:   Head: Normocephalic.   Eyes: Conjunctivae, EOM and lids are normal. Pupils are equal, round, and reactive to light.   Neck: Trachea normal and normal range of motion. Neck supple. No hepatojugular reflux and no JVD present. Carotid bruit is not present. No thyroid mass and no thyromegaly present.   Cardiovascular: Normal rate, regular rhythm, S1 normal, S2 normal, normal heart sounds and normal pulses.  No extrasystoles are present. PMI is not displaced.   1+ edema   Pulmonary/Chest: Effort normal and breath sounds normal.   No murmurs or bruits heard over the chest wall   Abdominal: Soft. Normal appearance and bowel sounds are normal. She exhibits no mass. There is no hepatosplenomegaly. There is no tenderness. No hernia.   Musculoskeletal: Normal range of motion.   Neurological: She is alert and oriented to person, place, and time. She has normal strength and normal reflexes. No cranial nerve deficit or sensory deficit. She displays a negative Romberg sign.   Skin: Skin is warm, dry and intact.   Psychiatric: She has a normal mood and affect. Her speech is normal and behavior is normal. Judgment and thought content normal. Cognition and memory are normal.     Independent Review of Radiographic Studies:    CT angiogram of the chest performed June 28, 2019 was independently reviewed.  There is a 3.2 x 2.8 cm arteriovenous malformation in the periphery of the base of the left lower lobe of the lung.  This is increased in size since February  2014 when it measured 2.5 x 2.3 cm.  There are no new infiltrates nodules or masses.  There is no significant hilar or mediastinal adenopathy.  There is no obvious pleural effusion.        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  This is asymptomatic.  It is fairly stable over the last 5 years.  Given her age and overall medical condition she would be high risk for thoracic surgery and resection of this AV malformation.  Her back pain is unrelated to this AV malformation.  I have recommended that we merely watch this with serial CT scans.  I have discussed this in detail with the patient and her sister.  I have answered all of their questions to their satisfaction.  She is requested that we just follow this.      Plan: Patient will return to see me in 6 months with a repeat CT of the chest with contrast.  I will be glad to see her sooner if you think it is necessary.  Thank you for asking us to participate in the care of Mrs. Singh.    Diagnoses and all orders for this visit:    Arteriovenous malformation  -     CT Chest With Contrast; Future

## 2019-07-31 RX ORDER — METHYLPREDNISOLONE 4 MG/1
TABLET ORAL
Qty: 21 TABLET | Refills: 0 | Status: SHIPPED | OUTPATIENT
Start: 2019-07-31 | End: 2019-09-04

## 2019-08-22 ENCOUNTER — TELEPHONE (OUTPATIENT)
Dept: FAMILY MEDICINE CLINIC | Facility: CLINIC | Age: 79
End: 2019-08-22

## 2019-08-22 DIAGNOSIS — G89.29 CHRONIC LEFT-SIDED LOW BACK PAIN WITH LEFT-SIDED SCIATICA: ICD-10-CM

## 2019-08-22 DIAGNOSIS — M54.42 CHRONIC LEFT-SIDED LOW BACK PAIN WITH LEFT-SIDED SCIATICA: ICD-10-CM

## 2019-08-22 RX ORDER — HYDROCODONE BITARTRATE AND ACETAMINOPHEN 5; 325 MG/1; MG/1
1 TABLET ORAL EVERY 4 HOURS PRN
Qty: 120 TABLET | Refills: 0 | Status: SHIPPED | OUTPATIENT
Start: 2019-08-22 | End: 2019-10-18 | Stop reason: SDUPTHER

## 2019-08-22 NOTE — TELEPHONE ENCOUNTER
Last ov 7/19/19    Needs tamara, will ask Nereida and forward to olga as well for when she returns ti run tamara in case    rx 7/19/19  ----- Message from Rita Butler sent at 8/22/2019  9:04 AM EDT -----  PT NEEDS A REILL ON:  #HYDROCODONE 5/325#120  PHARM#[phone] [facility]  PT'S#[phone]

## 2019-09-04 ENCOUNTER — OFFICE VISIT (OUTPATIENT)
Dept: FAMILY MEDICINE CLINIC | Facility: CLINIC | Age: 79
End: 2019-09-04

## 2019-09-04 VITALS
BODY MASS INDEX: 48.25 KG/M2 | DIASTOLIC BLOOD PRESSURE: 74 MMHG | HEART RATE: 73 BPM | WEIGHT: 262.2 LBS | RESPIRATION RATE: 16 BRPM | TEMPERATURE: 97.5 F | HEIGHT: 62 IN | SYSTOLIC BLOOD PRESSURE: 144 MMHG | OXYGEN SATURATION: 90 %

## 2019-09-04 DIAGNOSIS — I89.0 LYMPHEDEMA: Primary | ICD-10-CM

## 2019-09-04 DIAGNOSIS — B95.5 STREPTOCOCCAL CELLULITIS: ICD-10-CM

## 2019-09-04 DIAGNOSIS — L03.90 STREPTOCOCCAL CELLULITIS: ICD-10-CM

## 2019-09-04 DIAGNOSIS — L03.115 CELLULITIS OF RIGHT LOWER EXTREMITY: ICD-10-CM

## 2019-09-04 DIAGNOSIS — L03.116 CELLULITIS OF LEFT LOWER EXTREMITY: ICD-10-CM

## 2019-09-04 DIAGNOSIS — I87.2 VENOUS STASIS DERMATITIS OF BOTH LOWER EXTREMITIES: ICD-10-CM

## 2019-09-04 PROCEDURE — 99213 OFFICE O/P EST LOW 20 MIN: CPT | Performed by: FAMILY MEDICINE

## 2019-09-04 RX ORDER — CEPHALEXIN 500 MG/1
500 CAPSULE ORAL 3 TIMES DAILY
Qty: 30 CAPSULE | Refills: 0 | Status: SHIPPED | OUTPATIENT
Start: 2019-09-04 | End: 2019-09-30 | Stop reason: SDUPTHER

## 2019-09-04 RX ORDER — METHYLPREDNISOLONE 4 MG/1
TABLET ORAL
Qty: 21 TABLET | Refills: 0 | Status: SHIPPED | OUTPATIENT
Start: 2019-09-04 | End: 2019-09-30 | Stop reason: SDUPTHER

## 2019-09-04 NOTE — PROGRESS NOTES
HPI  Tamara Singh is a 79 y.o. female who is here for swollen lower legs.  Patient has a chronic dermatitis related to her lymphedema but has worsened over the last 2 days with blistering etc.  In the past has been treated with steroids and antibiotics.  Responds best to Keflex which has been used in the past despite reported history of penicillin allergy.  Does report some chills the last 2 nights consistent with infection.      Review of Systems   Constitutional: Positive for chills. Negative for fever.   Cardiovascular: Positive for leg swelling.   Musculoskeletal: Positive for back pain.   Skin: Positive for rash.   All other systems reviewed and are negative.        Past Medical History:   Diagnosis Date   • Arthritis    • Hypertension        Past Surgical History:   Procedure Laterality Date   • CHOLECYSTECTOMY  1974       History reviewed. No pertinent family history.    Social History     Socioeconomic History   • Marital status: Single     Spouse name: Not on file   • Number of children: Not on file   • Years of education: Not on file   • Highest education level: Not on file   Tobacco Use   • Smoking status: Never Smoker   • Smokeless tobacco: Never Used   Substance and Sexual Activity   • Alcohol use: No   • Drug use: No     Types: Hydrocodone   • Sexual activity: No         Physical Exam   Constitutional: She is oriented to person, place, and time. She appears well-developed and well-nourished. She appears distressed.   Eyes: Conjunctivae are normal.   Cardiovascular: Normal rate.   Pulmonary/Chest: Effort normal. No respiratory distress.   Musculoskeletal: She exhibits edema.   Neurological: She is alert and oriented to person, place, and time.   Skin: Skin is warm. Lesion and rash noted. Rash is vesicular. Rash is not pustular. There is erythema.        Psychiatric: She has a normal mood and affect. Her behavior is normal. Judgment and thought content normal.   Nursing note and vitals  reviewed.        Assessment/Plan    Tamara was seen today for leg swelling, back pain and lower extremity issue.    Diagnoses and all orders for this visit:    Lymphedema    Venous stasis dermatitis of both lower extremities    Cellulitis of left lower extremity    Cellulitis of right lower extremity    Streptococcal cellulitis    Other orders  -     cephalexin (KEFLEX) 500 MG capsule; Take 1 capsule by mouth 3 (Three) Times a Day.  -     methylPREDNISolone (MEDROL) 4 MG tablet; follow package directions        Damaso is here for recurrence of probable strep cellulitis of both anterior lower leg regions just above the ankle.  Has chronic dermatitis related to her lymphedema.  Similar episodes in the past have responded to Keflex and steroids.  Call if symptoms worsen or persist.  Also elevate and cool soaks are recommended.  Otherwise keep routine appointment in October at which time should discuss routine health maintenance including colonoscopy mammograms etc. also update tetanus and other immunizations.  However may need to go through pharmacy because of insurance issues which unfortunately cannot be verified by our computer system?    This note includes information entered using a voice recognition dictation system.  Though reviewed, some nonsensible errors may remain.

## 2019-09-27 ENCOUNTER — TELEPHONE (OUTPATIENT)
Dept: FAMILY MEDICINE CLINIC | Facility: CLINIC | Age: 79
End: 2019-09-27

## 2019-09-27 NOTE — TELEPHONE ENCOUNTER
----- Message from Yane Ruvalcaba sent at 9/27/2019 10:34 AM EDT -----  Patient [phone]  [facility]     Patient was in to see Dr Sauceda on 9-4-19 for issues with legs. He gave her two meds and told her to call back if they did not help. Please return her call.   I advised that I would send a prescription for a compounded medicine.  Medicine will be triamcinolone 0.5% ointment compounded with Aquaphor.  100 g will be dispensed and she can apply it twice daily.

## 2019-09-30 RX ORDER — METHYLPREDNISOLONE 4 MG/1
TABLET ORAL
Qty: 21 TABLET | Refills: 0 | Status: SHIPPED | OUTPATIENT
Start: 2019-09-30 | End: 2019-10-18

## 2019-09-30 RX ORDER — CEPHALEXIN 500 MG/1
500 CAPSULE ORAL 3 TIMES DAILY
Qty: 30 CAPSULE | Refills: 0 | Status: SHIPPED | OUTPATIENT
Start: 2019-09-30 | End: 2019-10-02 | Stop reason: ALTCHOICE

## 2019-10-02 DIAGNOSIS — L03.119 CELLULITIS OF LOWER EXTREMITY, UNSPECIFIED LATERALITY: Primary | ICD-10-CM

## 2019-10-02 RX ORDER — AZITHROMYCIN 250 MG/1
TABLET, FILM COATED ORAL
Qty: 6 TABLET | Refills: 0 | Status: SHIPPED | OUTPATIENT
Start: 2019-10-02 | End: 2019-10-18

## 2019-10-10 RX ORDER — CLONAZEPAM 0.5 MG/1
TABLET ORAL
Qty: 60 TABLET | Refills: 5 | Status: SHIPPED | OUTPATIENT
Start: 2019-10-10 | End: 2020-09-04 | Stop reason: SDUPTHER

## 2019-10-10 NOTE — TELEPHONE ENCOUNTER
CLONAZEPAM.  [facility] #[phone].    Future O.V. 10/18/2019.    Last O.V. 09/04/2019.  Leandra 07/17/2019 (LEANDRA updated).  Filled ????    Thank you.

## 2019-10-18 ENCOUNTER — OFFICE VISIT (OUTPATIENT)
Dept: FAMILY MEDICINE CLINIC | Facility: CLINIC | Age: 79
End: 2019-10-18

## 2019-10-18 VITALS
BODY MASS INDEX: 46.63 KG/M2 | WEIGHT: 253.4 LBS | RESPIRATION RATE: 16 BRPM | OXYGEN SATURATION: 95 % | HEART RATE: 81 BPM | HEIGHT: 62 IN | TEMPERATURE: 97.5 F

## 2019-10-18 DIAGNOSIS — L97.221 VENOUS STASIS ULCER OF LEFT CALF LIMITED TO BREAKDOWN OF SKIN WITHOUT VARICOSE VEINS (HCC): ICD-10-CM

## 2019-10-18 DIAGNOSIS — M54.6 PAIN IN THORACIC SPINE: ICD-10-CM

## 2019-10-18 DIAGNOSIS — M54.42 CHRONIC LEFT-SIDED LOW BACK PAIN WITH LEFT-SIDED SCIATICA: ICD-10-CM

## 2019-10-18 DIAGNOSIS — F41.9 ANXIETY: ICD-10-CM

## 2019-10-18 DIAGNOSIS — Z23 IMMUNIZATION DUE: ICD-10-CM

## 2019-10-18 DIAGNOSIS — I10 ESSENTIAL HYPERTENSION: Primary | ICD-10-CM

## 2019-10-18 DIAGNOSIS — E66.01 OBESITY, MORBID, BMI 50 OR HIGHER (HCC): ICD-10-CM

## 2019-10-18 DIAGNOSIS — I87.2 VENOUS STASIS ULCER OF LEFT CALF LIMITED TO BREAKDOWN OF SKIN WITHOUT VARICOSE VEINS (HCC): ICD-10-CM

## 2019-10-18 DIAGNOSIS — Q27.30 ARTERIOVENOUS MALFORMATION: ICD-10-CM

## 2019-10-18 DIAGNOSIS — M15.9 GENERALIZED OSTEOARTHRITIS: ICD-10-CM

## 2019-10-18 DIAGNOSIS — G47.33 OBSTRUCTIVE SLEEP APNEA SYNDROME: ICD-10-CM

## 2019-10-18 DIAGNOSIS — Z79.899 HIGH RISK MEDICATION USE: ICD-10-CM

## 2019-10-18 DIAGNOSIS — D75.1 SECONDARY POLYCYTHEMIA: ICD-10-CM

## 2019-10-18 DIAGNOSIS — I83.11 VARICOSE VEINS OF BOTH LOWER EXTREMITIES WITH INFLAMMATION: ICD-10-CM

## 2019-10-18 DIAGNOSIS — I83.12 VARICOSE VEINS OF BOTH LOWER EXTREMITIES WITH INFLAMMATION: ICD-10-CM

## 2019-10-18 DIAGNOSIS — I89.0 LYMPHEDEMA: ICD-10-CM

## 2019-10-18 DIAGNOSIS — G89.29 CHRONIC LEFT-SIDED LOW BACK PAIN WITH LEFT-SIDED SCIATICA: ICD-10-CM

## 2019-10-18 PROCEDURE — 99214 OFFICE O/P EST MOD 30 MIN: CPT | Performed by: FAMILY MEDICINE

## 2019-10-18 RX ORDER — HYDROCODONE BITARTRATE AND ACETAMINOPHEN 5; 325 MG/1; MG/1
1 TABLET ORAL EVERY 4 HOURS PRN
Qty: 120 TABLET | Refills: 0 | Status: SHIPPED | OUTPATIENT
Start: 2019-10-18 | End: 2020-01-13 | Stop reason: SDUPTHER

## 2019-10-18 NOTE — PROGRESS NOTES
HPI  Tamara Singh is a 79 y.o. female who is here for follow up medical problems including lymphedema with recent cellulitis.  Had adverse reaction to Keflex but responded to Z-Star.  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  Current medications reviewed and patient seems to be doing well on current regimen.  This does include muscle relaxers and anxiety medication and pain medication as needed.      Review of Systems   Constitutional: Negative for fever.        Has been able to lose some weight   Eyes: Positive for visual disturbance.   Cardiovascular: Positive for leg swelling. Negative for chest pain.   Gastrointestinal: Negative for abdominal pain.   Genitourinary: Negative for dysuria and pelvic pain.   Musculoskeletal: Positive for arthralgias and back pain.   Skin: Positive for wound.   Neurological: Positive for weakness and numbness. Negative for headaches.   All other systems reviewed and are negative.        Past Medical History:   Diagnosis Date   • Arthritis    • Hypertension        Past Surgical History:   Procedure Laterality Date   • CHOLECYSTECTOMY  1974       History reviewed. No pertinent family history.    Social History     Socioeconomic History   • Marital status: Single     Spouse name: Not on file   • Number of children: Not on file   • Years of education: Not on file   • Highest education level: Not on file   Tobacco Use   • Smoking status: Never Smoker   • Smokeless tobacco: Never Used   Substance and Sexual Activity   • Alcohol use: No   • Drug use: No     Types: Hydrocodone   • Sexual activity: No         Physical Exam   Constitutional: She appears well-developed and well-nourished. No distress.   HENT:   Head: Normocephalic.   Nose: Nose normal.   Mouth/Throat: Oropharynx is clear and moist.   Eyes: Conjunctivae and EOM are normal. Pupils are equal, round, and reactive to  light.   Neck: Normal range of motion. No thyromegaly present.   Cardiovascular: Normal rate.   Pulmonary/Chest: No respiratory distress.   Abdominal: Soft. She exhibits no distension.   Musculoskeletal: She exhibits edema and deformity.   Skin: There is erythema.        Psychiatric: She has a normal mood and affect. Her behavior is normal. Judgment and thought content normal.   Nursing note and vitals reviewed.        Assessment/Plan    Tamara was seen today for hypertension, sleep apnea, back pain, osteoarthritis, anxiety, ankle pain, knee pain and flu vaccine.    Diagnoses and all orders for this visit:    Essential hypertension  -     Comprehensive Metabolic Panel    Arteriovenous malformation    Obesity, morbid, BMI 50 or higher (CMS/HCC)    Obstructive sleep apnea syndrome    High risk medication use  -     Comprehensive Metabolic Panel    Lymphedema  -     Comprehensive Metabolic Panel    Secondary polycythemia  -     CBC & Differential    Anxiety    Chronic left-sided low back pain with left-sided sciatica  -     HYDROcodone-acetaminophen (NORCO) 5-325 MG per tablet; Take 1 tablet by mouth Every 4 (Four) Hours As Needed for Moderate Pain  or Severe Pain .    Venous stasis ulcer of left calf limited to breakdown of skin without varicose veins (CMS/[facility])  -     silver sulfadiazine (SILVADENE, SSD) 1 % cream; Apply  topically to the appropriate area as directed 2 (Two) Times a Day.    Immunization due  -     Influenza Vac Subunit Quad (FLUCELVAX) injection 0.5 mL    Varicose veins of both lower extremities with inflammation    Generalized osteoarthritis    Pain in thoracic spine        Patient presents for follow-up of above-noted medical problems.  Has been able to lose some weight and this is encouraged.  Remains with lymphedema and chronic skin changes.  There is an open wound on the anterior left shin and will give empiric antibiotic cream as discussed and noted above.  Otherwise seems fairly stable on current  regimen which will be continued.  He is using controlled medication with no evidence of abuse nor misuse of therapy.  We will continue to monitor closely including follow-up visits every 3 months.  High-dose flu currently not available in this office.  Patient request to get regular flu shot which is given.    This note includes information entered using a voice recognition dictation system.  Though reviewed, some nonsensible errors may remain.      Answers for HPI/ROS submitted by the patient on 10/16/2019   Back pain  Chronicity: recurrent  Onset: more than 1 year ago  Frequency: daily  Progression since onset: waxing and waning  Pain location: sacro-iliac  Pain quality: stabbing  Radiates to: left thigh  Pain - numeric: 10/10  Pain is: worse during the day  Aggravated by: position, standing  Stiffness is present: in the morning  bladder incontinence: No  bowel incontinence: No  leg pain: Yes  paresis: No  paresthesias: No  perianal numbness: No  tingling: No  weight loss: No  Risk factors: sedentary lifestyle

## 2019-10-19 LAB
ALBUMIN SERPL-MCNC: 4.8 G/DL (ref 3.5–5.2)
ALBUMIN/GLOB SERPL: 1.5 G/DL
ALP SERPL-CCNC: 82 U/L (ref 39–117)
ALT SERPL-CCNC: 20 U/L (ref 1–33)
AST SERPL-CCNC: 29 U/L (ref 1–32)
BASOPHILS # BLD AUTO: 0.03 10*3/MM3 (ref 0–0.2)
BASOPHILS NFR BLD AUTO: 0.4 % (ref 0–1.5)
BILIRUB SERPL-MCNC: 0.8 MG/DL (ref 0.2–1.2)
BUN SERPL-MCNC: 17 MG/DL (ref 8–23)
BUN/CREAT SERPL: 22.4 (ref 7–25)
CALCIUM SERPL-MCNC: 10.1 MG/DL (ref 8.6–10.5)
CHLORIDE SERPL-SCNC: 99 MMOL/L (ref 98–107)
CO2 SERPL-SCNC: 26.8 MMOL/L (ref 22–29)
CREAT SERPL-MCNC: 0.76 MG/DL (ref 0.57–1)
EOSINOPHIL # BLD AUTO: 0.13 10*3/MM3 (ref 0–0.4)
EOSINOPHIL NFR BLD AUTO: 1.9 % (ref 0.3–6.2)
ERYTHROCYTE [DISTWIDTH] IN BLOOD BY AUTOMATED COUNT: 14.2 % (ref 12.3–15.4)
GLOBULIN SER CALC-MCNC: 3.2 GM/DL
GLUCOSE SERPL-MCNC: 88 MG/DL (ref 65–99)
HCT VFR BLD AUTO: 46.8 % (ref 34–46.6)
HGB BLD-MCNC: 15.7 G/DL (ref 12–15.9)
IMM GRANULOCYTES # BLD AUTO: 0.03 10*3/MM3 (ref 0–0.05)
IMM GRANULOCYTES NFR BLD AUTO: 0.4 % (ref 0–0.5)
LYMPHOCYTES # BLD AUTO: 2.89 10*3/MM3 (ref 0.7–3.1)
LYMPHOCYTES NFR BLD AUTO: 41.8 % (ref 19.6–45.3)
MCH RBC QN AUTO: 30.7 PG (ref 26.6–33)
MCHC RBC AUTO-ENTMCNC: 33.5 G/DL (ref 31.5–35.7)
MCV RBC AUTO: 91.4 FL (ref 79–97)
MONOCYTES # BLD AUTO: 0.52 10*3/MM3 (ref 0.1–0.9)
MONOCYTES NFR BLD AUTO: 7.5 % (ref 5–12)
NEUTROPHILS # BLD AUTO: 3.31 10*3/MM3 (ref 1.7–7)
NEUTROPHILS NFR BLD AUTO: 48 % (ref 42.7–76)
NRBC BLD AUTO-RTO: 0 /100 WBC (ref 0–0.2)
PLATELET # BLD AUTO: 294 10*3/MM3 (ref 140–450)
POTASSIUM SERPL-SCNC: 4.7 MMOL/L (ref 3.5–5.2)
PROT SERPL-MCNC: 8 G/DL (ref 6–8.5)
RBC # BLD AUTO: 5.12 10*6/MM3 (ref 3.77–5.28)
SODIUM SERPL-SCNC: 141 MMOL/L (ref 136–145)
WBC # BLD AUTO: 6.91 10*3/MM3 (ref 3.4–10.8)

## 2019-10-29 DIAGNOSIS — M54.16 LUMBAR RADICULOPATHY: ICD-10-CM

## 2019-10-29 RX ORDER — CARISOPRODOL 350 MG/1
TABLET ORAL
Qty: 120 TABLET | Refills: 5 | Status: SHIPPED | OUTPATIENT
Start: 2019-10-29 | End: 2020-03-17 | Stop reason: ALTCHOICE

## 2019-10-29 NOTE — TELEPHONE ENCOUNTER
RANI.  [facility] #[phone].    Future O.V. 01/13/2020.    Last O.V. 10/18/2019.  Angel 10/08/2019.  Bahman 08/25/2019.    Thank you.

## 2019-11-18 RX ORDER — AMLODIPINE BESYLATE AND BENAZEPRIL HYDROCHLORIDE 10; 40 MG/1; MG/1
CAPSULE ORAL
Qty: 90 CAPSULE | Refills: 3 | Status: SHIPPED | OUTPATIENT
Start: 2019-11-18 | End: 2020-09-21

## 2019-12-06 ENCOUNTER — TELEPHONE (OUTPATIENT)
Dept: FAMILY MEDICINE CLINIC | Facility: CLINIC | Age: 79
End: 2019-12-06

## 2019-12-06 RX ORDER — METHYLPREDNISOLONE 4 MG/1
TABLET ORAL
Qty: 21 TABLET | Refills: 0 | Status: SHIPPED | OUTPATIENT
Start: 2019-12-06 | End: 2019-12-13

## 2019-12-06 NOTE — TELEPHONE ENCOUNTER
PT IS HAVING SEVERE LOW BACK PAIN AND WOULD LIKE A MEDROL DOSE ANGELITA CALLED IN.  PHARM#[phone]  PT'S#[phone]

## 2019-12-11 DIAGNOSIS — L97.221 VENOUS STASIS ULCER OF LEFT CALF LIMITED TO BREAKDOWN OF SKIN WITHOUT VARICOSE VEINS (HCC): ICD-10-CM

## 2019-12-11 DIAGNOSIS — I87.2 VENOUS STASIS ULCER OF LEFT CALF LIMITED TO BREAKDOWN OF SKIN WITHOUT VARICOSE VEINS (HCC): ICD-10-CM

## 2019-12-12 DIAGNOSIS — L97.221 VENOUS STASIS ULCER OF LEFT CALF LIMITED TO BREAKDOWN OF SKIN WITHOUT VARICOSE VEINS (HCC): ICD-10-CM

## 2019-12-12 DIAGNOSIS — I87.2 VENOUS STASIS ULCER OF LEFT CALF LIMITED TO BREAKDOWN OF SKIN WITHOUT VARICOSE VEINS (HCC): ICD-10-CM

## 2019-12-13 DIAGNOSIS — I87.2 VENOUS STASIS ULCER OF LEFT CALF LIMITED TO BREAKDOWN OF SKIN WITHOUT VARICOSE VEINS (HCC): ICD-10-CM

## 2019-12-13 DIAGNOSIS — L97.221 VENOUS STASIS ULCER OF LEFT CALF LIMITED TO BREAKDOWN OF SKIN WITHOUT VARICOSE VEINS (HCC): ICD-10-CM

## 2020-01-13 ENCOUNTER — OFFICE VISIT (OUTPATIENT)
Dept: FAMILY MEDICINE CLINIC | Facility: CLINIC | Age: 80
End: 2020-01-13

## 2020-01-13 VITALS
HEIGHT: 62 IN | HEART RATE: 62 BPM | TEMPERATURE: 97.7 F | SYSTOLIC BLOOD PRESSURE: 124 MMHG | DIASTOLIC BLOOD PRESSURE: 64 MMHG | OXYGEN SATURATION: 92 % | WEIGHT: 258 LBS | BODY MASS INDEX: 47.48 KG/M2

## 2020-01-13 DIAGNOSIS — G47.33 OBSTRUCTIVE SLEEP APNEA SYNDROME: ICD-10-CM

## 2020-01-13 DIAGNOSIS — Z23 IMMUNIZATION DUE: ICD-10-CM

## 2020-01-13 DIAGNOSIS — D75.1 SECONDARY POLYCYTHEMIA: ICD-10-CM

## 2020-01-13 DIAGNOSIS — I87.2 VENOUS STASIS DERMATITIS OF BOTH LOWER EXTREMITIES: ICD-10-CM

## 2020-01-13 DIAGNOSIS — M15.9 GENERALIZED OSTEOARTHRITIS: ICD-10-CM

## 2020-01-13 DIAGNOSIS — M54.42 CHRONIC LEFT-SIDED LOW BACK PAIN WITH LEFT-SIDED SCIATICA: ICD-10-CM

## 2020-01-13 DIAGNOSIS — E66.01 OBESITY, MORBID, BMI 50 OR HIGHER (HCC): Primary | ICD-10-CM

## 2020-01-13 DIAGNOSIS — Q27.30 ARTERIOVENOUS MALFORMATION: ICD-10-CM

## 2020-01-13 DIAGNOSIS — G89.29 CHRONIC LEFT-SIDED LOW BACK PAIN WITH LEFT-SIDED SCIATICA: ICD-10-CM

## 2020-01-13 DIAGNOSIS — I10 ESSENTIAL HYPERTENSION: ICD-10-CM

## 2020-01-13 PROCEDURE — 90732 PPSV23 VACC 2 YRS+ SUBQ/IM: CPT | Performed by: FAMILY MEDICINE

## 2020-01-13 PROCEDURE — G0009 ADMIN PNEUMOCOCCAL VACCINE: HCPCS | Performed by: FAMILY MEDICINE

## 2020-01-13 PROCEDURE — 99213 OFFICE O/P EST LOW 20 MIN: CPT | Performed by: FAMILY MEDICINE

## 2020-01-13 RX ORDER — PREDNISONE 10 MG/1
TABLET ORAL
Qty: 24 TABLET | Refills: 0 | Status: SHIPPED | OUTPATIENT
Start: 2020-01-13 | End: 2020-03-12 | Stop reason: SDUPTHER

## 2020-01-13 RX ORDER — HYDROCODONE BITARTRATE AND ACETAMINOPHEN 5; 325 MG/1; MG/1
1 TABLET ORAL EVERY 4 HOURS PRN
Qty: 120 TABLET | Refills: 0 | Status: SHIPPED | OUTPATIENT
Start: 2020-01-13 | End: 2020-03-26 | Stop reason: SDUPTHER

## 2020-01-13 NOTE — PROGRESS NOTES
HPI  Tamara Singh is a 79 y.o. female who is here for follow up of multiple medical problems including chronic back pain well controlled with Soma and current pain medications.  Chronic stasis dermatitis both lower extremities been treated for many years by dermatologist etc.  Overall seems fairly stable though remains inflamed etc.  Discussed health maintenance and recommend Medicare wellness evaluation at next appointment in 3 months.      Review of Systems   Constitutional: Negative for fever.   Cardiovascular: Positive for leg swelling. Negative for chest pain.   Gastrointestinal: Negative for abdominal pain.   Genitourinary: Negative for dysuria and pelvic pain.   Musculoskeletal: Positive for back pain.   Skin: Positive for rash. Negative for wound.   Neurological: Positive for weakness. Negative for numbness and headaches.         Past Medical History:   Diagnosis Date   • Arthritis    • Hypertension        Past Surgical History:   Procedure Laterality Date   • CHOLECYSTECTOMY  1974       No family history on file.    Social History     Socioeconomic History   • Marital status: Single     Spouse name: Not on file   • Number of children: Not on file   • Years of education: Not on file   • Highest education level: Not on file   Tobacco Use   • Smoking status: Never Smoker   • Smokeless tobacco: Never Used   Substance and Sexual Activity   • Alcohol use: No   • Drug use: No     Types: Hydrocodone   • Sexual activity: Never         Physical Exam   Constitutional: She is oriented to person, place, and time. She appears well-developed and well-nourished. No distress.   HENT:   Head: Normocephalic.   Nose: Nose normal.   Mouth/Throat: Oropharynx is clear and moist. No oropharyngeal exudate.   Eyes: Pupils are equal, round, and reactive to light. Conjunctivae are normal.   Neck: Normal range of motion.   Cardiovascular: Normal rate and regular rhythm.   Pulmonary/Chest: Effort normal. No respiratory distress.    Musculoskeletal: She exhibits edema.   Lymphadenopathy:     She has no cervical adenopathy.   Neurological: She is alert and oriented to person, place, and time. Coordination normal.   Skin: Skin is warm and dry. There is erythema.   Psychiatric: She has a normal mood and affect. Her behavior is normal. Judgment and thought content normal.   Nursing note and vitals reviewed.        Assessment/Plan    Tamara was seen today for follow-up, hypertension and back pain.    Diagnoses and all orders for this visit:    Obesity, morbid, BMI 50 or higher (CMS/HCC)    Essential hypertension    Arteriovenous malformation    Obstructive sleep apnea syndrome    Generalized osteoarthritis    Venous stasis dermatitis of both lower extremities  -     predniSONE (DELTASONE) 10 MG tablet; 4 stat, then 2 bid x3 days, then 2 qam x4 days    Secondary polycythemia    Chronic left-sided low back pain with left-sided sciatica  -     HYDROcodone-acetaminophen (NORCO) 5-325 MG per tablet; Take 1 tablet by mouth Every 4 (Four) Hours As Needed for Moderate Pain  or Severe Pain .      Routine follow-up of above-noted medical problems.  Chronic back pain seems to be functional on current regimen which will be continued with close monitoring every 3 months.  Discussed health maintenance including recommended mammogram but patient had multiple complications previously and would like to delay for now.  No evidence of abuse nor misuse of controlled medications which will be continued with close monitoring.    This note includes information entered using a voice recognition dictation system.  Though reviewed, some nonsensible errors may remain.            Answers for HPI/ROS submitted by the patient on 1/7/2020   Back pain  Chronicity: recurrent  Onset: more than 1 year ago  Frequency: daily  Progression since onset: waxing and waning  Pain location: lumbar spine, sacro-iliac, thoracic spine  Pain quality: stabbing  Radiates to: right thigh  Pain -  numeric: 10/10  Pain is: worse during the day  Aggravated by: position, standing  Stiffness is present: in the morning  bladder incontinence: No  bowel incontinence: No  leg pain: Yes  paresis: No  paresthesias: No  perianal numbness: No  tingling: No  weight loss: No  Risk factors: history of cancer, history of osteoporosis, history of steroid use, lack of exercise, obesity, sedentary lifestyle

## 2020-01-14 ENCOUNTER — RESULTS ENCOUNTER (OUTPATIENT)
Dept: FAMILY MEDICINE CLINIC | Facility: CLINIC | Age: 80
End: 2020-01-14

## 2020-01-14 DIAGNOSIS — Z12.11 SCREENING FOR COLORECTAL CANCER: ICD-10-CM

## 2020-01-14 DIAGNOSIS — Z12.11 SCREENING FOR COLORECTAL CANCER: Primary | ICD-10-CM

## 2020-01-14 DIAGNOSIS — Z12.12 SCREENING FOR COLORECTAL CANCER: Primary | ICD-10-CM

## 2020-01-14 DIAGNOSIS — Z12.12 SCREENING FOR COLORECTAL CANCER: ICD-10-CM

## 2020-01-20 ENCOUNTER — APPOINTMENT (OUTPATIENT)
Dept: CT IMAGING | Facility: HOSPITAL | Age: 80
End: 2020-01-20

## 2020-01-23 ENCOUNTER — TELEPHONE (OUTPATIENT)
Dept: FAMILY MEDICINE CLINIC | Facility: CLINIC | Age: 80
End: 2020-01-23

## 2020-01-23 NOTE — TELEPHONE ENCOUNTER
PT SAYS SHE IS WAITING FOR COLOGUARD KIT;I GAVE HER NUMBER TO CONTACT THEM TO SEE IF/WHEN IT WILL BE SHIPPED.

## 2020-01-31 ENCOUNTER — HOSPITAL ENCOUNTER (OUTPATIENT)
Dept: CT IMAGING | Facility: HOSPITAL | Age: 80
Discharge: HOME | End: 2020-01-31
Admitting: THORACIC SURGERY (CARDIOTHORACIC VASCULAR SURGERY)

## 2020-01-31 DIAGNOSIS — Q27.30 ARTERIOVENOUS MALFORMATION: ICD-10-CM

## 2020-01-31 LAB — CREAT BLDA-MCNC: 0.6 MG/DL (ref 0.6–1.3)

## 2020-01-31 PROCEDURE — 71260 CT THORAX DX C+: CPT

## 2020-01-31 PROCEDURE — 82565 ASSAY OF CREATININE: CPT

## 2020-01-31 PROCEDURE — 25010000002 IOPAMIDOL 61 % SOLUTION: Performed by: THORACIC SURGERY (CARDIOTHORACIC VASCULAR SURGERY)

## 2020-01-31 RX ADMIN — IOPAMIDOL 75 ML: 612 INJECTION, SOLUTION INTRAVENOUS at 12:40

## 2020-02-05 ENCOUNTER — OFFICE VISIT (OUTPATIENT)
Dept: SURGERY | Facility: CLINIC | Age: 80
End: 2020-02-05

## 2020-02-05 VITALS
DIASTOLIC BLOOD PRESSURE: 82 MMHG | OXYGEN SATURATION: 98 % | WEIGHT: 252.8 LBS | HEART RATE: 69 BPM | HEIGHT: 62 IN | BODY MASS INDEX: 46.52 KG/M2 | SYSTOLIC BLOOD PRESSURE: 142 MMHG

## 2020-02-05 DIAGNOSIS — I27.20 PULMONARY HYPERTENSION (HCC): ICD-10-CM

## 2020-02-05 DIAGNOSIS — Q27.30 ARTERIOVENOUS MALFORMATION: Primary | ICD-10-CM

## 2020-02-05 PROCEDURE — 99212 OFFICE O/P EST SF 10 MIN: CPT | Performed by: NURSE PRACTITIONER

## 2020-02-05 NOTE — PROGRESS NOTES
Subjective   Patient ID: Tamara Singh is a 80 y.o. female is here today for follow-up.    History of Present Illness  Dear Colleague,  Tamara Singh was seen in our office today for continued follow up and surveillance for an arterial venous malformation first identified in 2014. She has seen Dr. Sy who recommended watching with serial CT scans versus surgical intervention secondary to her multiple comorbidities.  She has been doing fairly well since she was last seen in our office.  She reports an occasional dry cough, but denies sputum production or hemoptysis.  She denies difficulty with shortness of air with exertion, but does have difficulty walking due to significant bilateral lower extremity lymphedema which she has been dealing with for several years. She denies any complaints of fever, chills, pleuritic chest pain, night sweats, hoarseness, or unintentional weight loss.     The following portions of the patient's history were reviewed and updated as appropriate: allergies, current medications, past family history, past medical history, past social history, past surgical history and problem list.  Review of Systems   Constitution: Negative.   HENT: Negative.    Eyes: Negative.    Cardiovascular: Positive for leg swelling. Negative for chest pain and dyspnea on exertion.   Respiratory: Positive for cough. Negative for hemoptysis, shortness of breath, sputum production and wheezing.    Endocrine: Negative.    Hematologic/Lymphatic: Negative.    Skin: Negative.    Musculoskeletal: Negative.    Gastrointestinal: Negative.    Genitourinary: Negative.    Neurological: Negative.    Psychiatric/Behavioral: Negative.    Allergic/Immunologic: Negative.      Patient Active Problem List   Diagnosis   • Lumbar radiculopathy   • Anxiety   • Secondary polycythemia   • Arteriovenous malformation   • Venous stasis dermatitis   • Eczema   • Hypertension   • Hypoglycemia   • Knee pain   • Low back pain   • Lymphedema   •  Obesity, morbid, BMI 50 or higher (CMS/HCC)   • Obstructive sleep apnea syndrome   • Pain in thoracic spine   • Varicose veins of lower extremity with inflammation   • Cobalamin deficiency   • Vitamin D deficiency   • Muscle spasms of both lower extremities   • Generalized osteoarthritis   • Pulmonary hypertension (CMS/HCC)     Past Medical History:   Diagnosis Date   • Arthritis    • Hypertension      Past Surgical History:   Procedure Laterality Date   • CHOLECYSTECTOMY  1974     History reviewed. No pertinent family history.  Social History     Socioeconomic History   • Marital status: Single     Spouse name: Not on file   • Number of children: Not on file   • Years of education: Not on file   • Highest education level: Not on file   Tobacco Use   • Smoking status: Never Smoker   • Smokeless tobacco: Never Used   Substance and Sexual Activity   • Alcohol use: No   • Drug use: No     Types: Hydrocodone   • Sexual activity: Never       Current Outpatient Medications:   •  amLODIPine-benazepril (LOTREL) 10-40 MG per capsule, TAKE ONE CAPSULE BY MOUTH EVERY DAY, Disp: 90 capsule, Rfl: 3  •  carisoprodol (SOMA) 350 MG tablet, TAKE 1 TABLET 4 TIMES DAILY AS NEEDED FOR MUSCLE SPASMS, Disp: 120 tablet, Rfl: 5  •  Cholecalciferol (VITAMIN D3) 1000 units capsule, Take 1,000 Units by mouth Daily. TAKE 1 CAPSULE BY MOUTH DAILY, Disp: 100 capsule, Rfl: 3  •  clonazePAM (KlonoPIN) 0.5 MG tablet, TAKE 1 TABLET BY MOUTH TWICE DAILY, Disp: 60 tablet, Rfl: 5  •  HYDROcodone-acetaminophen (NORCO) 5-325 MG per tablet, Take 1 tablet by mouth Every 4 (Four) Hours As Needed for Moderate Pain  or Severe Pain ., Disp: 120 tablet, Rfl: 0  •  LASIX 40 MG tablet, TAKE 1 TABLETBY MOUTH EVERY MORNING, Disp: 90 tablet, Rfl: 3  •  predniSONE (DELTASONE) 10 MG tablet, 4 stat, then 2 bid x3 days, then 2 qam x4 days, Disp: 24 tablet, Rfl: 0  •  PROAIR  (90 BASE) MCG/ACT inhaler, INHALE 2 PUFFS BY MOUTH 4 TIMES DAILY AS NEEDED, Disp: 1  inhaler, Rfl: 5  •  silver sulfadiazine (SILVADENE, SSD) 1 % cream, Apply  topically to the appropriate area as directed 2 (Two) Times a Day. *CM:I87.2, Disp: 85 g, Rfl: 2  •  vitamin B-12 (CYANOCOBALAMIN) 1000 MCG tablet, Take 1 tablet by mouth Daily., Disp: 100 tablet, Rfl: 3  •  Dermatological Products, Misc. (NIVATOPIC PLUS) cream, APPLY SPARINGLY TWO TIMES PER DAY TO THE LOWER LEGS, Disp: , Rfl: 4  •  ketorolac (ACULAR) 0.5 % ophthalmic solution, INSTILL 1 DROP INTO LEFT EYE 4 TIMES A DAY, Disp: , Rfl: 3  Allergies   Allergen Reactions   • Moxifloxacin    • Penicillins         Objective   Vitals:    02/05/20 1256   BP: 142/82   Pulse: 69   SpO2: 98%     Physical Exam   Constitutional: She is oriented to person, place, and time. She appears well-developed and well-nourished. No distress.   HENT:   Head: Normocephalic and atraumatic.   Eyes: Pupils are equal, round, and reactive to light. Conjunctivae and EOM are normal. No scleral icterus.   Neck: Normal range of motion. Neck supple. No JVD present. No tracheal deviation present.   Cardiovascular: Normal rate, regular rhythm, normal heart sounds and intact distal pulses.   No murmur heard.  Pulmonary/Chest: Effort normal and breath sounds normal. No stridor. No respiratory distress. She has no wheezes. She has no rales.   Abdominal: Soft. Bowel sounds are normal. She exhibits no mass.   Musculoskeletal: She exhibits edema.   Lymphadenopathy:     She has no cervical adenopathy.   Severe bilateral lower extremity lymphedema   Neurological: She is alert and oriented to person, place, and time.   Skin: Skin is warm and dry. Capillary refill takes less than 2 seconds. She is not diaphoretic.   Psychiatric: She has a normal mood and affect. Her behavior is normal. Judgment and thought content normal.   Nursing note and vitals reviewed.    Independent Review of Radiographic Studies: I personally reviewed the CT of the chest performed with contrast on 1/31/2020.  The  left lower lobe arteriovenous malformation appears stable in comparison to her June 2019 imaging.  The largest component measures approximately 3.2 cm which is stable.  His component measures 3.7 cm on the sagittal reconstruction series which is also stable.  There is some bilateral linear scarring in the lower lobes and lingula which is also stable there are no new pulmonary opacities or acute pulmonary process.  No pleural or pericardial effusions.  There is an increase in the size of the main pulmonary artery measuring 3.7 cm, previously 3.1 cm.    Assessment/Plan     Assessment: Mrs. Singh is stable and asymptomatic from her left lower lobe AVM.  She denies any significant shortness of air or hemoptysis.  Her CT of the chest shows stability in reference to the AVM, but there is some increase in size of her main pulmonary artery.  This pulmonary hypertension is likely secondary to the AVM.  Unfortunately, she is not a good surgical candidate given her multiple comorbidities.  I discussed this patient at length with Dr. Sy who also reviewed imaging.    Plan: We will plan follow-up in our office with another CT with contrast in 6 months.  I have recommended that the patient call me in the interim should she develop any worsening shortness of breath or hemoptysis.  She agrees with this plan.  Thank you for allowing us to participate in the care of Tamara Singh.  We will continue to keep you informed of her progress.    Diagnoses and all orders for this visit:    Arteriovenous malformation  -     CT Chest With Contrast; Future    Pulmonary hypertension (CMS/HCC)  -     CT Chest With Contrast; Future

## 2020-02-20 ENCOUNTER — TELEPHONE (OUTPATIENT)
Dept: FAMILY MEDICINE CLINIC | Facility: CLINIC | Age: 80
End: 2020-02-20

## 2020-02-20 NOTE — TELEPHONE ENCOUNTER
Called pt to check up on her Cologuard.  She stated that she did received it in the mail but she has not mailed it back yet.

## 2020-03-12 DIAGNOSIS — I87.2 VENOUS STASIS DERMATITIS OF BOTH LOWER EXTREMITIES: ICD-10-CM

## 2020-03-12 RX ORDER — PREDNISONE 10 MG/1
TABLET ORAL
Qty: 24 TABLET | Refills: 0 | Status: SHIPPED | OUTPATIENT
Start: 2020-03-12 | End: 2020-03-23

## 2020-03-14 DIAGNOSIS — L97.221 VENOUS STASIS ULCER OF LEFT CALF LIMITED TO BREAKDOWN OF SKIN WITHOUT VARICOSE VEINS (HCC): ICD-10-CM

## 2020-03-14 DIAGNOSIS — I87.2 VENOUS STASIS ULCER OF LEFT CALF LIMITED TO BREAKDOWN OF SKIN WITHOUT VARICOSE VEINS (HCC): ICD-10-CM

## 2020-03-17 RX ORDER — TIZANIDINE HYDROCHLORIDE 4 MG/1
4 CAPSULE, GELATIN COATED ORAL 3 TIMES DAILY
Qty: 90 CAPSULE | Refills: 11 | Status: SHIPPED | OUTPATIENT
Start: 2020-03-17 | End: 2020-07-06 | Stop reason: SINTOL

## 2020-03-23 DIAGNOSIS — I87.2 VENOUS STASIS DERMATITIS OF BOTH LOWER EXTREMITIES: ICD-10-CM

## 2020-03-23 RX ORDER — PREDNISONE 10 MG/1
TABLET ORAL
Qty: 24 TABLET | Refills: 0 | Status: SHIPPED | OUTPATIENT
Start: 2020-03-23 | End: 2020-04-27 | Stop reason: SDUPTHER

## 2020-03-26 DIAGNOSIS — G89.29 CHRONIC LEFT-SIDED LOW BACK PAIN WITH LEFT-SIDED SCIATICA: ICD-10-CM

## 2020-03-26 DIAGNOSIS — M54.42 CHRONIC LEFT-SIDED LOW BACK PAIN WITH LEFT-SIDED SCIATICA: ICD-10-CM

## 2020-03-26 RX ORDER — HYDROCODONE BITARTRATE AND ACETAMINOPHEN 5; 325 MG/1; MG/1
1 TABLET ORAL EVERY 4 HOURS PRN
Qty: 120 TABLET | Refills: 0 | Status: SHIPPED | OUTPATIENT
Start: 2020-03-26 | End: 2020-05-21 | Stop reason: SDUPTHER

## 2020-04-14 ENCOUNTER — TELEPHONE (OUTPATIENT)
Dept: FAMILY MEDICINE CLINIC | Facility: CLINIC | Age: 80
End: 2020-04-14

## 2020-04-14 DIAGNOSIS — I10 ESSENTIAL HYPERTENSION: Primary | ICD-10-CM

## 2020-04-14 RX ORDER — FUROSEMIDE 40 MG/1
40 TABLET ORAL DAILY
Qty: 90 TABLET | Refills: 3 | Status: CANCELLED | OUTPATIENT
Start: 2020-04-14 | End: 2021-04-09

## 2020-04-14 RX ORDER — FUROSEMIDE 40 MG/1
40 TABLET ORAL DAILY
Qty: 90 TABLET | Refills: 3 | Status: SHIPPED | OUTPATIENT
Start: 2020-04-14 | End: 2020-06-04

## 2020-04-14 NOTE — TELEPHONE ENCOUNTER
[facility] SENT OVER A REQUEST FOR AN ALTERNATIVE TO LASIX AS IT IS NOT ON THE FORMULARY     CAN YOU PLEASE UPDATE TO A FORMULARY MEDICATION?

## 2020-04-27 ENCOUNTER — OFFICE VISIT (OUTPATIENT)
Dept: FAMILY MEDICINE CLINIC | Facility: CLINIC | Age: 80
End: 2020-04-27

## 2020-04-27 VITALS
HEART RATE: 72 BPM | BODY MASS INDEX: 45.64 KG/M2 | WEIGHT: 248 LBS | DIASTOLIC BLOOD PRESSURE: 76 MMHG | OXYGEN SATURATION: 90 % | SYSTOLIC BLOOD PRESSURE: 132 MMHG | TEMPERATURE: 97.6 F | HEIGHT: 62 IN

## 2020-04-27 DIAGNOSIS — E55.9 VITAMIN D DEFICIENCY: ICD-10-CM

## 2020-04-27 DIAGNOSIS — Q27.30 ARTERIOVENOUS MALFORMATION: ICD-10-CM

## 2020-04-27 DIAGNOSIS — I89.0 LYMPHEDEMA: ICD-10-CM

## 2020-04-27 DIAGNOSIS — E66.01 OBESITY, MORBID, BMI 50 OR HIGHER (HCC): ICD-10-CM

## 2020-04-27 DIAGNOSIS — I10 ESSENTIAL HYPERTENSION: ICD-10-CM

## 2020-04-27 DIAGNOSIS — M54.50 ACUTE MIDLINE LOW BACK PAIN, UNSPECIFIED WHETHER SCIATICA PRESENT: Primary | ICD-10-CM

## 2020-04-27 DIAGNOSIS — M54.16 LUMBAR RADICULOPATHY: ICD-10-CM

## 2020-04-27 DIAGNOSIS — M15.9 GENERALIZED OSTEOARTHRITIS: ICD-10-CM

## 2020-04-27 DIAGNOSIS — Z00.00 MEDICARE ANNUAL WELLNESS VISIT, INITIAL: ICD-10-CM

## 2020-04-27 DIAGNOSIS — D75.1 SECONDARY POLYCYTHEMIA: ICD-10-CM

## 2020-04-27 DIAGNOSIS — E53.8 COBALAMIN DEFICIENCY: ICD-10-CM

## 2020-04-27 DIAGNOSIS — Z79.899 HIGH RISK MEDICATION USE: ICD-10-CM

## 2020-04-27 DIAGNOSIS — I87.2 VENOUS STASIS DERMATITIS OF BOTH LOWER EXTREMITIES: ICD-10-CM

## 2020-04-27 DIAGNOSIS — I27.20 PULMONARY HYPERTENSION (HCC): ICD-10-CM

## 2020-04-27 DIAGNOSIS — F41.9 ANXIETY: ICD-10-CM

## 2020-04-27 PROCEDURE — G0438 PPPS, INITIAL VISIT: HCPCS | Performed by: FAMILY MEDICINE

## 2020-04-27 RX ORDER — PREDNISONE 10 MG/1
TABLET ORAL
Qty: 24 TABLET | Refills: 0 | Status: SHIPPED | OUTPATIENT
Start: 2020-04-27 | End: 2020-07-06 | Stop reason: SDUPTHER

## 2020-04-27 NOTE — PROGRESS NOTES
The ABCs of the Annual Wellness Visit  Initial Medicare Wellness Visit    No chief complaint on file.      Subjective   History of Present Illness:  Tamara Singh is a 80 y.o. female who presents for an Initial Medicare Wellness Visit.    HEALTH RISK ASSESSMENT    Recent Hospitalizations:  No hospitalization(s) within the last year.    Current Medical Providers:  Patient Care Team:  Dawson Sauceda MD as PCP - General  Dawson Sauceda MD as PCP - Family Medicine  Dawson Sauceda MD as PCP - Claims Attributed    Smoking Status:  Social History     Tobacco Use   Smoking Status Never Smoker   Smokeless Tobacco Never Used       Alcohol Consumption:  Social History     Substance and Sexual Activity   Alcohol Use No       Depression Screen:   PHQ-2/PHQ-9 Depression Screening 4/27/2020   Little interest or pleasure in doing things 1   Feeling down, depressed, or hopeless 0   Total Score 1       Fall Risk Screen:  YOSELINADI Fall Risk Assessment was completed, and patient is at LOW risk for falls.Assessment completed on:4/27/2020    Health Habits and Functional and Cognitive Screening:  Functional & Cognitive Status 4/27/2020   Do you have difficulty preparing food and eating? No   Do you have difficulty bathing yourself, getting dressed or grooming yourself? Yes   Do you have difficulty using the toilet? No   Do you have difficulty moving around from place to place? No   Do you have trouble with steps or getting out of a bed or a chair? Yes   Current Diet Well Balanced Diet   Dental Exam Not up to date   Eye Exam Not up to date   Exercise (times per week) 0 times per week   Current Exercise Activities Include None   Do you need help using the phone?  No   Are you deaf or do you have serious difficulty hearing?  No   Do you need help with transportation? Yes   Do you need help shopping? No   Do you need help preparing meals?  No   Do you need help with housework?  Yes   Do you need help with laundry? Yes   Do you need help  taking your medications? No   Do you need help managing money? No   Do you ever drive or ride in a car without wearing a seat belt? No   Have you felt unusual stress, anger or loneliness in the last month? Yes   Who do you live with? Sibling   If you need help, do you have trouble finding someone available to you? No   Have you been bothered in the last four weeks by sexual problems? No   Do you have difficulty concentrating, remembering or making decisions? No         Does the patient have evidence of cognitive impairment? No    Asprin use counseling:Does not need ASA (and currently is not on it)    Age-appropriate Screening Schedule:  Refer to the list below for future screening recommendations based on patient's age, sex and/or medical conditions. Orders for these recommended tests are listed in the plan section. The patient has been provided with a written plan.    Health Maintenance   Topic Date Due   • TDAP/TD VACCINES (1 - Tdap) 01/26/1951   • ZOSTER VACCINE (1 of 2) 01/26/1991   • COLONOSCOPY  03/17/2016   • MAMMOGRAM  03/23/2016   • INFLUENZA VACCINE  08/01/2020          The following portions of the patient's history were reviewed and updated as appropriate: allergies, current medications, past family history, past medical history, past social history and past surgical history.    Outpatient Medications Prior to Visit   Medication Sig Dispense Refill   • amLODIPine-benazepril (LOTREL) 10-40 MG per capsule TAKE ONE CAPSULE BY MOUTH EVERY DAY 90 capsule 3   • Cholecalciferol (VITAMIN D3) 1000 units capsule Take 1,000 Units by mouth Daily. TAKE 1 CAPSULE BY MOUTH DAILY 100 capsule 3   • clonazePAM (KlonoPIN) 0.5 MG tablet TAKE 1 TABLET BY MOUTH TWICE DAILY 60 tablet 5   • Dermatological Products, Misc. (NIVATOPIC PLUS) cream APPLY SPARINGLY TWO TIMES PER DAY TO THE LOWER LEGS  4   • furosemide (LASIX) 40 MG tablet Take 1 tablet by mouth Daily. 90 tablet 3   • HYDROcodone-acetaminophen (NORCO) 5-325 MG per  tablet Take 1 tablet by mouth Every 4 (Four) Hours As Needed for Moderate Pain  or Severe Pain . 120 tablet 0   • ketorolac (ACULAR) 0.5 % ophthalmic solution INSTILL 1 DROP INTO LEFT EYE 4 TIMES A DAY  3   • PROAIR  (90 BASE) MCG/ACT inhaler INHALE 2 PUFFS BY MOUTH 4 TIMES DAILY AS NEEDED 1 inhaler 5   • silver sulfadiazine (SILVADENE, SSD) 1 % cream APPLY TOPICALLY TO THE APPROPRIATE AREA AS DIRECTED 2 (TWO) TIMES A DAY. *CM:I87.2 85 g 2   • TiZANidine (ZANAFLEX) 4 MG capsule Take 1 capsule by mouth 3 (Three) Times a Day. 90 capsule 11   • vitamin B-12 (CYANOCOBALAMIN) 1000 MCG tablet Take 1 tablet by mouth Daily. 100 tablet 3   • predniSONE (DELTASONE) 10 MG tablet TAKE 4 TABLETS BY MOUTH NOW THEN TAKE 2 TABLETS BY MOUTH TWICE DAILY FOR 3 DAYS THEN 2 TABLETS EACH MORNING FOR 4 DAYS \ 24 tablet 0     No facility-administered medications prior to visit.        Patient Active Problem List   Diagnosis   • Lumbar radiculopathy   • Anxiety   • Secondary polycythemia   • Arteriovenous malformation   • Venous stasis dermatitis   • Eczema   • Hypertension   • Hypoglycemia   • Knee pain   • Low back pain   • Lymphedema   • Obesity, morbid, BMI 50 or higher (CMS/HCC)   • Obstructive sleep apnea syndrome   • Pain in thoracic spine   • Varicose veins of lower extremity with inflammation   • Cobalamin deficiency   • Vitamin D deficiency   • Muscle spasms of both lower extremities   • Generalized osteoarthritis   • Pulmonary hypertension (CMS/HCC)       Advanced Care Planning:  ACP discussion was held with the patient during this visit. Patient does not have an advance directive, information provided.    Review of Systems   Constitutional: Negative for chills and fever.   Respiratory: Negative for cough and shortness of breath.    Musculoskeletal: Positive for arthralgias and back pain.   All other systems reviewed and are negative.      Compared to one year ago, the patient feels her physical health is the  "same.  Compared to one year ago, the patient feels her mental health is the same.    Reviewed chart for potential of high risk medication in the elderly: no  Reviewed chart for potential of harmful drug interactions in the elderly:no    Objective         Vitals:    04/27/20 0859 04/27/20 0930   BP: 132/76 132/76   Pulse: 72 72   Temp: 97.6 °F (36.4 °C) 97.6 °F (36.4 °C)   SpO2: 90%    Weight: 112 kg (248 lb)    Height: 157.5 cm (62.01\")        Body mass index is 45.35 kg/m².  Discussed the patient's BMI with her. The BMI is above average; no BMI management plan is appropriate..    Physical Exam   Constitutional: She is oriented to person, place, and time.   HENT:   Head: Normocephalic.   Eyes: Pupils are equal, round, and reactive to light. EOM are normal.   Cardiovascular: Normal rate.   Pulmonary/Chest: Effort normal. No respiratory distress.   Neurological: She is alert and oriented to person, place, and time.   Psychiatric: She has a normal mood and affect. Her speech is normal and behavior is normal. Judgment and thought content normal. Cognition and memory are normal. She is attentive.   Vitals reviewed.            Assessment/Plan   Medicare Risks and Personalized Health Plan  CMS Preventative Services Quick Reference  Obesity/Overweight     The above risks/problems have been discussed with the patient.  Pertinent information has been shared with the patient in the After Visit Summary.  Follow up plans and orders are seen below in the Assessment/Plan Section.    Diagnoses and all orders for this visit:    1. Acute midline low back pain, unspecified whether sciatica present (Primary)  -     predniSONE (DELTASONE) 10 MG tablet; TAKE 4 TABLETS BY MOUTH NOW THEN TAKE 2 TABLETS BY MOUTH TWICE DAILY FOR 3 DAYS THEN 2 TABLETS EACH MORNING FOR 4 DAYS  Dispense: 24 tablet; Refill: 0    2. Venous stasis dermatitis of both lower extremities    3. Medicare annual wellness visit, initial    4. Generalized " osteoarthritis    5. Lumbar radiculopathy    6. Essential hypertension    7. Pulmonary hypertension (CMS/HCC)    8. High risk medication use    9. Lymphedema    10. Secondary polycythemia    11. Anxiety    12. Cobalamin deficiency    13. Obesity, morbid, BMI 50 or higher (CMS/HCC)    14. Vitamin D deficiency    15. Arteriovenous malformation      Follow Up:  Patient for video visit especially for initial Medicare wellness evaluation.  Reports acute low back pain over the last few days.  Has gotten relief in the past with prednisone which will be renewed.  Multiple ongoing medical issues as noted above which overall seem to be stable.  Is due for routine lab work and will schedule appointment in 3 months specifically for lab evaluation.  Also health maintenance issues will be addressed specifically mammography and colonoscopy.  Also consider hepatitis C screening which is now recommended for all age groups.  Patient is on controlled medication but obviously there is no evidence of abuse nor misuse of medication and will continue to monitor closely every 3 months.  For some reason pharmacy only gave 30-day supply of blood pressure and other medication?  This also was discussed.  Also will send brochure regarding advanced directives.    An After Visit Summary and PPPS were given to the patient.

## 2020-05-21 DIAGNOSIS — G89.29 CHRONIC LEFT-SIDED LOW BACK PAIN WITH LEFT-SIDED SCIATICA: ICD-10-CM

## 2020-05-21 DIAGNOSIS — M54.42 CHRONIC LEFT-SIDED LOW BACK PAIN WITH LEFT-SIDED SCIATICA: ICD-10-CM

## 2020-05-21 RX ORDER — HYDROCODONE BITARTRATE AND ACETAMINOPHEN 5; 325 MG/1; MG/1
1 TABLET ORAL EVERY 4 HOURS PRN
Qty: 120 TABLET | Refills: 0 | Status: SHIPPED | OUTPATIENT
Start: 2020-05-21 | End: 2020-07-06 | Stop reason: SDUPTHER

## 2020-06-04 RX ORDER — FUROSEMIDE 40 MG/1
TABLET ORAL
Qty: 90 TABLET | Refills: 3 | Status: SHIPPED | OUTPATIENT
Start: 2020-06-04 | End: 2021-03-24

## 2020-06-14 DIAGNOSIS — L97.221 VENOUS STASIS ULCER OF LEFT CALF LIMITED TO BREAKDOWN OF SKIN WITHOUT VARICOSE VEINS (HCC): ICD-10-CM

## 2020-06-14 DIAGNOSIS — I87.2 VENOUS STASIS ULCER OF LEFT CALF LIMITED TO BREAKDOWN OF SKIN WITHOUT VARICOSE VEINS (HCC): ICD-10-CM

## 2020-07-06 ENCOUNTER — OFFICE VISIT (OUTPATIENT)
Dept: FAMILY MEDICINE CLINIC | Facility: CLINIC | Age: 80
End: 2020-07-06

## 2020-07-06 VITALS
DIASTOLIC BLOOD PRESSURE: 80 MMHG | TEMPERATURE: 98 F | WEIGHT: 242 LBS | OXYGEN SATURATION: 94 % | HEIGHT: 62 IN | HEART RATE: 83 BPM | BODY MASS INDEX: 44.53 KG/M2 | RESPIRATION RATE: 18 BRPM | SYSTOLIC BLOOD PRESSURE: 110 MMHG

## 2020-07-06 DIAGNOSIS — M54.50 ACUTE MIDLINE LOW BACK PAIN, UNSPECIFIED WHETHER SCIATICA PRESENT: ICD-10-CM

## 2020-07-06 DIAGNOSIS — M62.838 MUSCLE SPASMS OF BOTH LOWER EXTREMITIES: ICD-10-CM

## 2020-07-06 DIAGNOSIS — G89.29 CHRONIC LEFT-SIDED LOW BACK PAIN WITH LEFT-SIDED SCIATICA: ICD-10-CM

## 2020-07-06 DIAGNOSIS — I27.20 PULMONARY HYPERTENSION (HCC): ICD-10-CM

## 2020-07-06 DIAGNOSIS — Q27.30 ARTERIOVENOUS MALFORMATION: ICD-10-CM

## 2020-07-06 DIAGNOSIS — I89.0 LYMPHEDEMA: ICD-10-CM

## 2020-07-06 DIAGNOSIS — M54.42 CHRONIC LEFT-SIDED LOW BACK PAIN WITH LEFT-SIDED SCIATICA: ICD-10-CM

## 2020-07-06 DIAGNOSIS — Z79.899 HIGH RISK MEDICATION USE: Primary | ICD-10-CM

## 2020-07-06 DIAGNOSIS — M15.9 GENERALIZED OSTEOARTHRITIS: ICD-10-CM

## 2020-07-06 DIAGNOSIS — E66.01 OBESITY, MORBID, BMI 50 OR HIGHER (HCC): ICD-10-CM

## 2020-07-06 DIAGNOSIS — M54.16 LUMBAR RADICULOPATHY: ICD-10-CM

## 2020-07-06 DIAGNOSIS — D75.1 SECONDARY POLYCYTHEMIA: ICD-10-CM

## 2020-07-06 DIAGNOSIS — G47.33 OBSTRUCTIVE SLEEP APNEA SYNDROME: ICD-10-CM

## 2020-07-06 DIAGNOSIS — I10 ESSENTIAL HYPERTENSION: ICD-10-CM

## 2020-07-06 PROCEDURE — 99213 OFFICE O/P EST LOW 20 MIN: CPT | Performed by: FAMILY MEDICINE

## 2020-07-06 RX ORDER — CARISOPRODOL 350 MG/1
350 TABLET ORAL 4 TIMES DAILY PRN
Qty: 120 TABLET | Refills: 5 | Status: SHIPPED | OUTPATIENT
Start: 2020-07-06 | End: 2021-01-25 | Stop reason: SDUPTHER

## 2020-07-06 RX ORDER — PREDNISONE 10 MG/1
TABLET ORAL
Qty: 24 TABLET | Refills: 0 | Status: SHIPPED | OUTPATIENT
Start: 2020-07-06 | End: 2020-08-04 | Stop reason: SDUPTHER

## 2020-07-06 RX ORDER — HYDROCODONE BITARTRATE AND ACETAMINOPHEN 5; 325 MG/1; MG/1
1 TABLET ORAL EVERY 4 HOURS PRN
Qty: 120 TABLET | Refills: 0 | Status: SHIPPED | OUTPATIENT
Start: 2020-07-06 | End: 2020-08-14 | Stop reason: SDUPTHER

## 2020-07-06 NOTE — PROGRESS NOTES
HPI  Tamara Singh is a 80 y.o. female who is here for follow up of multiple ongoing medical issues.  Patient has been confined at home since last visit completely.  Sister going to dialysis 3 days a week.  Patient continues with bilateral lumbar radiculopathy especially down into the left leg.  Chronic lymphedema with erythema at times.  Requires intermittent cortisone and this was discussed.  Also pain medication and previous muscle relaxers worked much better than current prescription that apparently caused dry mouth and allergic symptoms.  Risks versus benefits of continued controlled medications discussed.  Overall feel benefits outweigh risks at this time and will continue to monitor closely.  New contract completed and discussed.  Also will get drug screening but cannot get urine sample at this time so will get serum.  Patient is here with her brother.      Review of Systems   Constitutional: Negative for fever and unexpected weight change.   Cardiovascular: Positive for leg swelling. Negative for chest pain.   Gastrointestinal: Negative for abdominal pain.   Genitourinary: Negative for dysuria and pelvic pain.   Musculoskeletal: Positive for arthralgias and back pain.   Skin: Positive for rash. Negative for wound.   Neurological: Positive for weakness and numbness. Negative for headaches.   All other systems reviewed and are negative.        Past Medical History:   Diagnosis Date   • Arthritis    • Hypertension        Past Surgical History:   Procedure Laterality Date   • CHOLECYSTECTOMY  1974       No family history on file.    Social History     Socioeconomic History   • Marital status: Single     Spouse name: Not on file   • Number of children: Not on file   • Years of education: Not on file   • Highest education level: Not on file   Tobacco Use   • Smoking status: Never Smoker   • Smokeless tobacco: Never Used   Substance and Sexual Activity   • Alcohol use: No   • Drug use: No     Types: Hydrocodone   •  Sexual activity: Never         Physical Exam   Constitutional: She is oriented to person, place, and time. She appears well-developed and well-nourished. No distress.   HENT:   Head: Normocephalic and atraumatic.   Eyes: Pupils are equal, round, and reactive to light. Conjunctivae and EOM are normal.   Neck: Normal range of motion.   Cardiovascular: Normal rate and regular rhythm.   Pulmonary/Chest: Effort normal.   Abdominal: Soft.   Neurological: She is alert and oriented to person, place, and time.   Skin: Skin is warm and dry.        Psychiatric: She has a normal mood and affect. Her behavior is normal. Judgment and thought content normal.   Nursing note and vitals reviewed.        Assessment/Plan    Tamara was seen today for back pain.    Diagnoses and all orders for this visit:    High risk medication use  -     CBC & Differential  -     Comprehensive Metabolic Panel  -     Drug screen panel 1, serum    Acute midline low back pain, unspecified whether sciatica present  -     predniSONE (DELTASONE) 10 MG tablet; TAKE 4 TABLETS BY MOUTH NOW THEN TAKE 2 TABLETS BY MOUTH TWICE DAILY FOR 3 DAYS THEN 2 TABLETS EACH MORNING FOR 4 DAYS       Chronic left-sided low back pain with left-sided sciatica  -     HYDROcodone-acetaminophen (NORCO) 5-325 MG per tablet; Take 1 tablet by mouth Every 4 (Four) Hours As Needed for Moderate Pain  or Severe Pain .  -     carisoprodol (SOMA) 350 MG tablet; Take 1 tablet by mouth 4 (Four) Times a Day As Needed for Muscle Spasms.    Generalized osteoarthritis    Muscle spasms of both lower extremities    Secondary polycythemia  -     CBC & Differential    Lymphedema    Essential hypertension  -     Comprehensive Metabolic Panel    Arteriovenous malformation    Pulmonary hypertension (CMS/HCC)    Obstructive sleep apnea syndrome    Obesity, morbid, BMI 50 or higher (CMS/HCC)    Lumbar radiculopathy      Patient here for routine follow-up of above-noted medical problems.  Again having  flareup of sciatica and will give prednisone pack.  Discussed eventually changing to NSAID therapy?  Also discussed continued controlled medications specifically risks versus benefits.  No evidence of abuse nor misuse of medication which will be continued and monitored closely every 3 months.    This note includes information entered using a voice recognition dictation system.  Though reviewed, some nonsensible errors may remain.        Answers for HPI/ROS submitted by the patient on 7/1/2020   Back pain  What is the primary reason for your visit?: Back Pain  Chronicity: chronic  Onset: more than 1 year ago  Frequency: 2 to 4 times per day  Progression since onset: waxing and waning  Pain location: lumbar spine, sacro-iliac  Pain quality: stabbing  Radiates to: left thigh, right thigh  Pain - numeric: 10/10  Pain is: worse during the day  Aggravated by: bending, position, standing  bladder incontinence: No  bowel incontinence: No  leg pain: Yes  paresis: No  paresthesias: No  perianal numbness: No  tingling: No  weight loss: No  Risk factors: history of cancer, history of osteoporosis, history of steroid use, lack of exercise, obesity, sedentary lifestyle

## 2020-07-10 ENCOUNTER — TELEPHONE (OUTPATIENT)
Dept: FAMILY MEDICINE CLINIC | Facility: CLINIC | Age: 80
End: 2020-07-10

## 2020-07-10 LAB
ALBUMIN SERPL-MCNC: 4.9 G/DL (ref 3.7–4.7)
ALBUMIN/GLOB SERPL: 1.5 {RATIO} (ref 1.2–2.2)
ALP SERPL-CCNC: 96 IU/L (ref 39–117)
ALT SERPL-CCNC: 13 IU/L (ref 0–32)
AMPHETAMINES SERPL QL SCN: NEGATIVE NG/ML
AST SERPL-CCNC: 24 IU/L (ref 0–40)
BARBITURATES SERPL QL SCN: NEGATIVE UG/ML
BASOPHILS # BLD AUTO: 0 X10E3/UL (ref 0–0.2)
BASOPHILS NFR BLD AUTO: 0 %
BENZODIAZ SERPL QL SCN: NEGATIVE NG/ML
BILIRUB SERPL-MCNC: 0.7 MG/DL (ref 0–1.2)
BUN SERPL-MCNC: 13 MG/DL (ref 8–27)
BUN/CREAT SERPL: 22 (ref 12–28)
CALCIUM SERPL-MCNC: 10.2 MG/DL (ref 8.7–10.3)
CANNABINOIDS SERPL QL SCN: NEGATIVE NG/ML
CHLORIDE SERPL-SCNC: 100 MMOL/L (ref 96–106)
CO2 SERPL-SCNC: 26 MMOL/L (ref 20–29)
COCAINE+BZE SERPL QL SCN: NEGATIVE NG/ML
CREAT SERPL-MCNC: 0.6 MG/DL (ref 0.57–1)
EOSINOPHIL # BLD AUTO: 0.1 X10E3/UL (ref 0–0.4)
EOSINOPHIL NFR BLD AUTO: 2 %
ERYTHROCYTE [DISTWIDTH] IN BLOOD BY AUTOMATED COUNT: 13.4 % (ref 11.7–15.4)
GLOBULIN SER CALC-MCNC: 3.2 G/DL (ref 1.5–4.5)
GLUCOSE SERPL-MCNC: 86 MG/DL (ref 65–99)
HCT VFR BLD AUTO: 44 % (ref 34–46.6)
HGB BLD-MCNC: 14.6 G/DL (ref 11.1–15.9)
IMM GRANULOCYTES # BLD AUTO: 0 X10E3/UL (ref 0–0.1)
IMM GRANULOCYTES NFR BLD AUTO: 0 %
LYMPHOCYTES # BLD AUTO: 2.7 X10E3/UL (ref 0.7–3.1)
LYMPHOCYTES NFR BLD AUTO: 36 %
MCH RBC QN AUTO: 30.3 PG (ref 26.6–33)
MCHC RBC AUTO-ENTMCNC: 33.2 G/DL (ref 31.5–35.7)
MCV RBC AUTO: 91 FL (ref 79–97)
METHADONE SERPL QL SCN: NEGATIVE NG/ML
MONOCYTES # BLD AUTO: 0.6 X10E3/UL (ref 0.1–0.9)
MONOCYTES NFR BLD AUTO: 8 %
NEUTROPHILS # BLD AUTO: 4.2 X10E3/UL (ref 1.4–7)
NEUTROPHILS NFR BLD AUTO: 54 %
OPIATES SERPL QL SCN: NEGATIVE NG/ML
OXYCODONE SERPLBLD CFM-MCNC: NEGATIVE NG/ML
OXYCODONE+OXYMORPHONE SERPLBLD CFM-IMP: NEGATIVE
OXYCODONE+OXYMORPHONE SERPLBLD QL SCN: NEGATIVE NG/ML
OXYMORPHONE SERPLBLD CFM-MCNC: NEGATIVE NG/ML
PCP SERPL QL SCN: NEGATIVE NG/ML
PLATELET # BLD AUTO: 305 X10E3/UL (ref 150–450)
POTASSIUM SERPL-SCNC: 4 MMOL/L (ref 3.5–5.2)
PROPOXYPH SERPL QL SCN: NEGATIVE NG/ML
PROT SERPL-MCNC: 8.1 G/DL (ref 6–8.5)
RBC # BLD AUTO: 4.82 X10E6/UL (ref 3.77–5.28)
SODIUM SERPL-SCNC: 143 MMOL/L (ref 134–144)
WBC # BLD AUTO: 7.7 X10E3/UL (ref 3.4–10.8)

## 2020-07-10 NOTE — TELEPHONE ENCOUNTER
PATIENT REQUESTED TO GET A PHONE CALL ABOUT THE LABS THAT SHE HAD DONE ON 7/06/2020, PATIENT JUST REQUESTED TO GET THE RESULTS.    BEST CALL BACK  [phone]

## 2020-08-04 DIAGNOSIS — M54.50 ACUTE MIDLINE LOW BACK PAIN, UNSPECIFIED WHETHER SCIATICA PRESENT: ICD-10-CM

## 2020-08-04 RX ORDER — PREDNISONE 10 MG/1
TABLET ORAL
Qty: 24 TABLET | Refills: 0 | Status: SHIPPED | OUTPATIENT
Start: 2020-08-04 | End: 2020-10-06 | Stop reason: SDUPTHER

## 2020-08-05 ENCOUNTER — APPOINTMENT (OUTPATIENT)
Dept: CT IMAGING | Facility: HOSPITAL | Age: 80
End: 2020-08-05

## 2020-08-10 ENCOUNTER — HOSPITAL ENCOUNTER (OUTPATIENT)
Dept: CT IMAGING | Facility: HOSPITAL | Age: 80
Discharge: HOME | End: 2020-08-10
Admitting: NURSE PRACTITIONER

## 2020-08-10 DIAGNOSIS — I27.20 PULMONARY HYPERTENSION (HCC): ICD-10-CM

## 2020-08-10 DIAGNOSIS — Q27.30 ARTERIOVENOUS MALFORMATION: ICD-10-CM

## 2020-08-10 LAB — CREAT BLDA-MCNC: 0.6 MG/DL (ref 0.6–1.3)

## 2020-08-10 PROCEDURE — 25010000002 IOPAMIDOL 61 % SOLUTION: Performed by: NURSE PRACTITIONER

## 2020-08-10 PROCEDURE — 71260 CT THORAX DX C+: CPT

## 2020-08-10 PROCEDURE — 82565 ASSAY OF CREATININE: CPT

## 2020-08-10 RX ADMIN — IOPAMIDOL 75 ML: 612 INJECTION, SOLUTION INTRAVENOUS at 10:30

## 2020-08-14 ENCOUNTER — TELEPHONE (OUTPATIENT)
Dept: FAMILY MEDICINE CLINIC | Facility: CLINIC | Age: 80
End: 2020-08-14

## 2020-08-14 DIAGNOSIS — M54.42 CHRONIC LEFT-SIDED LOW BACK PAIN WITH LEFT-SIDED SCIATICA: ICD-10-CM

## 2020-08-14 DIAGNOSIS — G89.29 CHRONIC LEFT-SIDED LOW BACK PAIN WITH LEFT-SIDED SCIATICA: ICD-10-CM

## 2020-08-14 RX ORDER — HYDROCODONE BITARTRATE AND ACETAMINOPHEN 5; 325 MG/1; MG/1
1 TABLET ORAL EVERY 4 HOURS PRN
Qty: 120 TABLET | Refills: 0 | Status: CANCELLED | OUTPATIENT
Start: 2020-08-14

## 2020-08-14 RX ORDER — HYDROCODONE BITARTRATE AND ACETAMINOPHEN 5; 325 MG/1; MG/1
1 TABLET ORAL EVERY 4 HOURS PRN
Qty: 120 TABLET | Refills: 0 | Status: SHIPPED | OUTPATIENT
Start: 2020-08-14 | End: 2020-11-28 | Stop reason: SDUPTHER

## 2020-08-14 NOTE — TELEPHONE ENCOUNTER
PATIENT CALLED IN AND IS REQUESTING A REFILL OF HYDROcodone-acetaminophen (NORCO) 5-325 MG per tablet      SENT TO [facility]/pharmacy #[id number] - [place], [place] - [facility] - [phone]  - [phone]   [phone]      PATIENT CALL BACK : [phone]

## 2020-08-18 ENCOUNTER — OFFICE VISIT (OUTPATIENT)
Dept: SURGERY | Facility: CLINIC | Age: 80
End: 2020-08-18

## 2020-08-18 DIAGNOSIS — Q27.30 ARTERIOVENOUS MALFORMATION: Primary | ICD-10-CM

## 2020-08-18 PROCEDURE — 99441 PR PHYS/QHP TELEPHONE EVALUATION 5-10 MIN: CPT | Performed by: THORACIC SURGERY (CARDIOTHORACIC VASCULAR SURGERY)

## 2020-08-18 NOTE — PROGRESS NOTES
Subjective   Patient ID: Tamara Singh is a 80 y.o. female is here today for follow-up.    History of Present Illness  Dear Colleague,  Tamara Singh was contacted by telephone today August 18, 2020 and consented to a telemedicine visit.  We have been following her for 8 months for an AV malformation in the lung.  She has been doing well.  She reports no cough or hemoptysis.  She has no pleuritic pain.  She has no hoarseness or change in her voice.  Her activities are somewhat limited due to her bad back and therefore it is difficult to assess shortness of breath.  She has no wheezing.  She has had no unexplained weight loss.    The following portions of the patient's history were reviewed and updated as appropriate: allergies, current medications, past family history, past medical history, past social history, past surgical history and problem list.  Review of Systems   Constitution: Negative.   HENT: Negative.    Eyes: Negative.    Cardiovascular: Negative.    Respiratory: Negative.    Endocrine: Negative.    Hematologic/Lymphatic: Negative.    Skin: Negative.    Musculoskeletal: Negative.    Gastrointestinal: Negative.    Genitourinary: Negative.    Neurological: Negative.    Psychiatric/Behavioral: Negative.      Patient Active Problem List   Diagnosis   • Lumbar radiculopathy   • Anxiety   • Secondary polycythemia   • Arteriovenous malformation   • Venous stasis dermatitis   • Eczema   • Hypertension   • Hypoglycemia   • Knee pain   • Low back pain   • Lymphedema   • Obesity, morbid, BMI 50 or higher (CMS/HCC)   • Obstructive sleep apnea syndrome   • Pain in thoracic spine   • Varicose veins of lower extremity with inflammation   • Cobalamin deficiency   • Vitamin D deficiency   • Muscle spasms of both lower extremities   • Generalized osteoarthritis   • Pulmonary hypertension (CMS/HCC)   • High risk medication use     Past Medical History:   Diagnosis Date   • Arthritis    • Hypertension      Past Surgical  History:   Procedure Laterality Date   • CHOLECYSTECTOMY  1974     History reviewed. No pertinent family history.  Social History     Socioeconomic History   • Marital status: Single     Spouse name: Not on file   • Number of children: Not on file   • Years of education: Not on file   • Highest education level: Not on file   Tobacco Use   • Smoking status: Never Smoker   • Smokeless tobacco: Never Used   Substance and Sexual Activity   • Alcohol use: No   • Drug use: No     Types: Hydrocodone   • Sexual activity: Never       Current Outpatient Medications:   •  amLODIPine-benazepril (LOTREL) 10-40 MG per capsule, TAKE ONE CAPSULE BY MOUTH EVERY DAY, Disp: 90 capsule, Rfl: 3  •  carisoprodol (SOMA) 350 MG tablet, Take 1 tablet by mouth 4 (Four) Times a Day As Needed for Muscle Spasms., Disp: 120 tablet, Rfl: 5  •  clonazePAM (KlonoPIN) 0.5 MG tablet, TAKE 1 TABLET BY MOUTH TWICE DAILY, Disp: 60 tablet, Rfl: 5  •  HYDROcodone-acetaminophen (NORCO) 5-325 MG per tablet, Take 1 tablet by mouth Every 4 (Four) Hours As Needed for Moderate Pain  or Severe Pain ., Disp: 120 tablet, Rfl: 0  •  ketorolac (ACULAR) 0.5 % ophthalmic solution, INSTILL 1 DROP INTO LEFT EYE 4 TIMES A DAY, Disp: , Rfl: 3  •  LASIX 40 MG tablet, TAKE 1 TABLETBY MOUTH EVERY MORNING, Disp: 90 tablet, Rfl: 3  •  predniSONE (DELTASONE) 10 MG tablet, TAKE 4 TABLETS BY MOUTH NOW THEN TAKE 2 TABLETS BY MOUTH TWICE DAILY FOR 3 DAYS THEN 2 TABLETS EACH MORNING FOR 4 DAYS \, Disp: 24 tablet, Rfl: 0  •  PROAIR  (90 BASE) MCG/ACT inhaler, INHALE 2 PUFFS BY MOUTH 4 TIMES DAILY AS NEEDED, Disp: 1 inhaler, Rfl: 5  •  silver sulfadiazine (SILVADENE, SSD) 1 % cream, APPLY TOPICALLY TO THE APPROPRIATE AREA AS DIRECTED 2 (TWO) TIMES A DAY. *CM:I87.2, Disp: 85 g, Rfl: 2  Allergies   Allergen Reactions   • Moxifloxacin    • Penicillins         Objective   There were no vitals filed for this visit.  Physical Exam     Telemedicine visit    Independent Review of  Radiographic Studies:    CT of the chest with intravenous contrast performed August 10, 2020 was independently reviewed and compared to previous CT scans.  There is bands of scarring and segmental atelectasis in the mid and lower lung zones bilaterally which are stable.  There is an AV malformation in the anterior left lower lobe measuring 32 mm.  This is unchanged.  There are no new infiltrates nodules or masses.  There is no suspicious hilar or mediastinal adenopathy.  There are no pleural effusions.  Main pulmonary artery is enlarged and this is also unchanged.  Upper abdomen is unremarkable.    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  This remains asymptomatic.  Given the patient's age and other comorbidities surgical intervention is not indicated.  However will continue to follow her with serial CT scans.  Have recommended a repeat CT scan in 1 year.  Patient knows that if she develops any symptoms such as cough, hemoptysis, increasing shortness of breath, or pleuritic pain she should contact me.    Plan:    Return to the office in 1 year with a CT of the chest with contrast.  I will keep you informed of her progress.  Thank you for allowing us participate in the care of Mrs. Singh.      Diagnoses and all orders for this visit:    Arteriovenous malformation  -     CT Chest With Contrast; Future

## 2020-09-03 DIAGNOSIS — L97.221 VENOUS STASIS ULCER OF LEFT CALF LIMITED TO BREAKDOWN OF SKIN WITHOUT VARICOSE VEINS (HCC): ICD-10-CM

## 2020-09-03 DIAGNOSIS — I87.2 VENOUS STASIS ULCER OF LEFT CALF LIMITED TO BREAKDOWN OF SKIN WITHOUT VARICOSE VEINS (HCC): ICD-10-CM

## 2020-09-04 ENCOUNTER — TELEPHONE (OUTPATIENT)
Dept: FAMILY MEDICINE CLINIC | Facility: CLINIC | Age: 80
End: 2020-09-04

## 2020-09-04 DIAGNOSIS — F41.9 ANXIETY: Primary | ICD-10-CM

## 2020-09-04 RX ORDER — CLONAZEPAM 0.5 MG/1
0.5 TABLET ORAL 2 TIMES DAILY
Qty: 60 TABLET | Refills: 5 | Status: SHIPPED | OUTPATIENT
Start: 2020-09-04 | End: 2021-05-17

## 2020-09-21 RX ORDER — AMLODIPINE BESYLATE AND BENAZEPRIL HYDROCHLORIDE 10; 40 MG/1; MG/1
CAPSULE ORAL
Qty: 90 CAPSULE | Refills: 3 | Status: SHIPPED | OUTPATIENT
Start: 2020-09-21 | End: 2021-08-18

## 2020-10-06 DIAGNOSIS — M54.50 ACUTE MIDLINE LOW BACK PAIN, UNSPECIFIED WHETHER SCIATICA PRESENT: ICD-10-CM

## 2020-10-07 RX ORDER — PREDNISONE 10 MG/1
TABLET ORAL
Qty: 24 TABLET | Refills: 0 | Status: SHIPPED | OUTPATIENT
Start: 2020-10-07 | End: 2020-12-08 | Stop reason: SDUPTHER

## 2020-11-28 DIAGNOSIS — G89.29 CHRONIC LEFT-SIDED LOW BACK PAIN WITH LEFT-SIDED SCIATICA: ICD-10-CM

## 2020-11-28 DIAGNOSIS — M54.42 CHRONIC LEFT-SIDED LOW BACK PAIN WITH LEFT-SIDED SCIATICA: ICD-10-CM

## 2020-11-30 RX ORDER — HYDROCODONE BITARTRATE AND ACETAMINOPHEN 5; 325 MG/1; MG/1
1 TABLET ORAL EVERY 4 HOURS PRN
Qty: 120 TABLET | Refills: 0 | Status: SHIPPED | OUTPATIENT
Start: 2020-11-30 | End: 2021-01-25 | Stop reason: SDUPTHER

## 2020-12-08 DIAGNOSIS — M54.50 ACUTE MIDLINE LOW BACK PAIN, UNSPECIFIED WHETHER SCIATICA PRESENT: ICD-10-CM

## 2020-12-08 RX ORDER — PREDNISONE 10 MG/1
TABLET ORAL
Qty: 24 TABLET | Refills: 0 | Status: SHIPPED | OUTPATIENT
Start: 2020-12-08 | End: 2021-02-02 | Stop reason: SDUPTHER

## 2020-12-08 NOTE — TELEPHONE ENCOUNTER
Caller: Tamara Singh    Relationship: Self    Best call back number: [phone]    Medication needed:   Requested Prescriptions     Pending Prescriptions Disp Refills   • predniSONE (DELTASONE) 10 MG tablet 24 tablet 0     Sig: TAKE 4 TABLETS BY MOUTH NOW THEN TAKE 2 TABLETS BY MOUTH TWICE DAILY FOR 3 DAYS THEN 2 TABLETS EACH MORNING FOR 4 DAYS \       When do you need the refill by: ASAP    What details did the patient provide when requesting the medication: HAS SEVERE BACK PAIN AND IS REQUESTING A REFILL OF THIS MEDICATION AS IT HELPS WITH BACK PAIN    Does the patient have less than a 3 day supply:  [x] Yes  [] No    What is the patient's preferred pharmacy: [facility]/PHARMACY #[id number] [address] AT [facility] [phone] [place] [phone] FX

## 2020-12-19 DIAGNOSIS — L97.221 VENOUS STASIS ULCER OF LEFT CALF LIMITED TO BREAKDOWN OF SKIN WITHOUT VARICOSE VEINS (HCC): ICD-10-CM

## 2020-12-19 DIAGNOSIS — I87.2 VENOUS STASIS ULCER OF LEFT CALF LIMITED TO BREAKDOWN OF SKIN WITHOUT VARICOSE VEINS (HCC): ICD-10-CM

## 2021-01-11 DIAGNOSIS — G89.29 CHRONIC LEFT-SIDED LOW BACK PAIN WITH LEFT-SIDED SCIATICA: ICD-10-CM

## 2021-01-11 DIAGNOSIS — M54.42 CHRONIC LEFT-SIDED LOW BACK PAIN WITH LEFT-SIDED SCIATICA: ICD-10-CM

## 2021-01-14 DIAGNOSIS — G89.29 CHRONIC LEFT-SIDED LOW BACK PAIN WITH LEFT-SIDED SCIATICA: ICD-10-CM

## 2021-01-14 DIAGNOSIS — M54.42 CHRONIC LEFT-SIDED LOW BACK PAIN WITH LEFT-SIDED SCIATICA: ICD-10-CM

## 2021-01-14 RX ORDER — HYDROCODONE BITARTRATE AND ACETAMINOPHEN 5; 325 MG/1; MG/1
1 TABLET ORAL EVERY 4 HOURS PRN
Qty: 120 TABLET | Refills: 0 | OUTPATIENT
Start: 2021-01-14

## 2021-01-14 RX ORDER — CARISOPRODOL 350 MG/1
350 TABLET ORAL 4 TIMES DAILY PRN
Qty: 120 TABLET | Refills: 5 | OUTPATIENT
Start: 2021-01-14

## 2021-01-14 NOTE — TELEPHONE ENCOUNTER
Controlled substance.  Needs an appointment every 3 months. Schedule an appointment. Hub please inform patient

## 2021-01-14 NOTE — TELEPHONE ENCOUNTER
Controlled substance.  Overdue for an appointment needs to be seen every 3 months for controlled refills.  Schedule an appointment. Hub please inform patient

## 2021-01-25 ENCOUNTER — OFFICE VISIT (OUTPATIENT)
Dept: FAMILY MEDICINE CLINIC | Facility: CLINIC | Age: 81
End: 2021-01-25

## 2021-01-25 VITALS
HEART RATE: 62 BPM | WEIGHT: 229.6 LBS | DIASTOLIC BLOOD PRESSURE: 78 MMHG | BODY MASS INDEX: 42.25 KG/M2 | TEMPERATURE: 95.5 F | HEIGHT: 62 IN | RESPIRATION RATE: 18 BRPM | OXYGEN SATURATION: 98 % | SYSTOLIC BLOOD PRESSURE: 120 MMHG

## 2021-01-25 DIAGNOSIS — E53.8 COBALAMIN DEFICIENCY: ICD-10-CM

## 2021-01-25 DIAGNOSIS — M15.9 GENERALIZED OSTEOARTHRITIS: ICD-10-CM

## 2021-01-25 DIAGNOSIS — Z23 NEED FOR VACCINATION: ICD-10-CM

## 2021-01-25 DIAGNOSIS — G89.29 CHRONIC LEFT-SIDED LOW BACK PAIN WITH LEFT-SIDED SCIATICA: ICD-10-CM

## 2021-01-25 DIAGNOSIS — Z12.31 BREAST CANCER SCREENING BY MAMMOGRAM: ICD-10-CM

## 2021-01-25 DIAGNOSIS — Q27.30 ARTERIOVENOUS MALFORMATION: ICD-10-CM

## 2021-01-25 DIAGNOSIS — M54.42 CHRONIC LEFT-SIDED LOW BACK PAIN WITH LEFT-SIDED SCIATICA: ICD-10-CM

## 2021-01-25 DIAGNOSIS — E66.01 OBESITY, MORBID, BMI 50 OR HIGHER (HCC): ICD-10-CM

## 2021-01-25 DIAGNOSIS — M54.16 LUMBAR RADICULOPATHY: ICD-10-CM

## 2021-01-25 DIAGNOSIS — Z79.899 HIGH RISK MEDICATION USE: Primary | ICD-10-CM

## 2021-01-25 DIAGNOSIS — Z11.59 ENCOUNTER FOR HEPATITIS C SCREENING TEST FOR LOW RISK PATIENT: ICD-10-CM

## 2021-01-25 DIAGNOSIS — I89.0 LYMPHEDEMA: ICD-10-CM

## 2021-01-25 DIAGNOSIS — I10 ESSENTIAL HYPERTENSION: ICD-10-CM

## 2021-01-25 PROCEDURE — 90694 VACC AIIV4 NO PRSRV 0.5ML IM: CPT | Performed by: FAMILY MEDICINE

## 2021-01-25 PROCEDURE — G0008 ADMIN INFLUENZA VIRUS VAC: HCPCS | Performed by: FAMILY MEDICINE

## 2021-01-25 PROCEDURE — 99213 OFFICE O/P EST LOW 20 MIN: CPT | Performed by: FAMILY MEDICINE

## 2021-01-25 RX ORDER — MULTIVIT-MIN/IRON/FOLIC ACID/K 18-600-40
1 CAPSULE ORAL DAILY
COMMUNITY
End: 2023-03-08

## 2021-01-25 RX ORDER — HYDROCODONE BITARTRATE AND ACETAMINOPHEN 5; 325 MG/1; MG/1
1 TABLET ORAL EVERY 4 HOURS PRN
Qty: 120 TABLET | Refills: 0 | Status: SHIPPED | OUTPATIENT
Start: 2021-01-25 | End: 2021-04-20 | Stop reason: SDUPTHER

## 2021-01-25 RX ORDER — CARISOPRODOL 350 MG/1
350 TABLET ORAL 4 TIMES DAILY PRN
Qty: 120 TABLET | Refills: 5 | Status: SHIPPED | OUTPATIENT
Start: 2021-01-25 | End: 2022-03-14

## 2021-01-25 NOTE — PROGRESS NOTES
HPI  Tamara Singh is a 80 y.o. female who is here for follow up specially of chronic back pain which especially goes into the left leg.  Otherwise doing well on current regimen which includes muscle relaxers and pain medications.  Has known AV malformation in the long but seems to be stable.  Ask about Covid vaccine which is strongly recommended when available.  Also due for routine lab work as noted below.      Review of Systems   Constitutional: Negative for fever.   Cardiovascular: Negative for chest pain.   Gastrointestinal: Negative for abdominal pain.   Genitourinary: Negative for dysuria and pelvic pain.   Musculoskeletal: Positive for arthralgias and back pain.   Neurological: Positive for weakness. Negative for numbness and headaches.   All other systems reviewed and are negative.        Past Medical History:   Diagnosis Date   • Arthritis    • Hypertension        Past Surgical History:   Procedure Laterality Date   • CHOLECYSTECTOMY  1974       No family history on file.    Social History     Socioeconomic History   • Marital status: Single     Spouse name: Not on file   • Number of children: Not on file   • Years of education: Not on file   • Highest education level: Not on file   Tobacco Use   • Smoking status: Never Smoker   • Smokeless tobacco: Never Used   Substance and Sexual Activity   • Alcohol use: No   • Drug use: No     Types: Hydrocodone   • Sexual activity: Never       Vitals:    01/25/21 1302   BP: 120/78   Pulse: 62   Resp: 18   Temp: 95.5 °F (35.3 °C)   SpO2: 98%        Body mass index is 41.98 kg/m².      Physical Exam  Vitals signs and nursing note reviewed.   Constitutional:       General: She is not in acute distress.     Appearance: She is well-developed. She is obese.   HENT:      Head: Normocephalic and atraumatic.   Eyes:      Extraocular Movements: Extraocular movements intact.      Conjunctiva/sclera: Conjunctivae normal.      Pupils: Pupils are equal, round, and reactive to  light.   Neck:      Musculoskeletal: Normal range of motion.      Thyroid: No thyromegaly.   Cardiovascular:      Rate and Rhythm: Normal rate and regular rhythm.      Heart sounds: Normal heart sounds.   Pulmonary:      Effort: Pulmonary effort is normal. No respiratory distress.      Breath sounds: Normal breath sounds.   Abdominal:      General: There is no distension.      Palpations: Abdomen is soft. There is no mass.      Tenderness: There is no abdominal tenderness.      Hernia: No hernia is present.   Musculoskeletal: Normal range of motion.         General: No tenderness or deformity.      Right lower leg: Edema present.      Left lower leg: Edema present.   Lymphadenopathy:      Cervical: No cervical adenopathy.   Skin:     General: Skin is warm and dry.      Coloration: Skin is not pale.      Findings: Erythema and rash present.   Neurological:      General: No focal deficit present.      Mental Status: She is alert and oriented to person, place, and time. Mental status is at baseline.      Motor: No abnormal muscle tone.      Coordination: Coordination normal.   Psychiatric:         Behavior: Behavior normal.         Thought Content: Thought content normal.         Judgment: Judgment normal.           Assessment/Plan    Diagnoses and all orders for this visit:    1. High risk medication use (Primary)  -     CBC & Differential  -     Comprehensive Metabolic Panel    2. Arteriovenous malformation    3. Essential hypertension    4. Obesity, morbid, BMI 50 or higher (CMS/HCC)    5. Cobalamin deficiency  -     CBC & Differential  -     Vitamin B12    6. Generalized osteoarthritis    7. Lumbar radiculopathy    8. Lymphedema  -     Comprehensive Metabolic Panel    9. Encounter for hepatitis C screening test for low risk patient  -     Hepatitis C Antibody    10. Chronic left-sided low back pain with left-sided sciatica  -     HYDROcodone-acetaminophen (NORCO) 5-325 MG per tablet; Take 1 tablet by mouth Every 4  (Four) Hours As Needed for Moderate Pain  or Severe Pain .  Dispense: 120 tablet; Refill: 0  -     carisoprodol (SOMA) 350 MG tablet; Take 1 tablet by mouth 4 (Four) Times a Day As Needed for Muscle Spasms.  Dispense: 120 tablet; Refill: 5        Patient here for routine follow-up of above-noted medical problems.  Overall stable on current regimen which will be continued including pain pills and muscle relaxers.  No evidence of abuse nor misuse of medication which will be continued with close monitoring.    This note includes information entered using a voice recognition dictation system.  Though reviewed, some nonsensible errors may remain.      Answers for HPI/ROS submitted by the patient on 1/18/2021   Back pain  What is the primary reason for your visit?: Back Pain  Chronicity: chronic  Onset: more than 1 year ago  Frequency: daily  Progression since onset: waxing and waning  Pain location: sacro-iliac  Pain quality: stabbing  Radiates to: right thigh  Pain - numeric: 10/10  Pain is: worse during the day  Aggravated by: standing  Stiffness is present: in the morning  bladder incontinence: No  bowel incontinence: No  leg pain: Yes  paresis: No  paresthesias: No  perianal numbness: No  tingling: No  weight loss: No  Risk factors: history of cancer, history of osteoporosis, history of steroid use, lack of exercise, obesity, sedentary lifestyle

## 2021-01-26 ENCOUNTER — TRANSCRIBE ORDERS (OUTPATIENT)
Dept: ADMINISTRATIVE | Facility: HOSPITAL | Age: 81
End: 2021-01-26

## 2021-01-26 DIAGNOSIS — E53.8 COBALAMIN DEFICIENCY: Primary | ICD-10-CM

## 2021-01-26 DIAGNOSIS — Z12.31 SCREENING MAMMOGRAM FOR HIGH-RISK PATIENT: Primary | ICD-10-CM

## 2021-01-26 LAB
ALBUMIN SERPL-MCNC: 4.7 G/DL (ref 3.5–5.2)
ALBUMIN/GLOB SERPL: 1.5 G/DL
ALP SERPL-CCNC: 94 U/L (ref 39–117)
ALT SERPL-CCNC: 15 U/L (ref 1–33)
AST SERPL-CCNC: 28 U/L (ref 1–32)
BASOPHILS # BLD AUTO: 0.02 10*3/MM3 (ref 0–0.2)
BASOPHILS NFR BLD AUTO: 0.3 % (ref 0–1.5)
BILIRUB SERPL-MCNC: 0.7 MG/DL (ref 0–1.2)
BUN SERPL-MCNC: 20 MG/DL (ref 8–23)
BUN/CREAT SERPL: 28.2 (ref 7–25)
CALCIUM SERPL-MCNC: 10 MG/DL (ref 8.6–10.5)
CHLORIDE SERPL-SCNC: 102 MMOL/L (ref 98–107)
CO2 SERPL-SCNC: 26.4 MMOL/L (ref 22–29)
CREAT SERPL-MCNC: 0.71 MG/DL (ref 0.57–1)
EOSINOPHIL # BLD AUTO: 0.12 10*3/MM3 (ref 0–0.4)
EOSINOPHIL NFR BLD AUTO: 2.1 % (ref 0.3–6.2)
ERYTHROCYTE [DISTWIDTH] IN BLOOD BY AUTOMATED COUNT: 13.8 % (ref 12.3–15.4)
GLOBULIN SER CALC-MCNC: 3.1 GM/DL
GLUCOSE SERPL-MCNC: 79 MG/DL (ref 65–99)
HCT VFR BLD AUTO: 45.5 % (ref 34–46.6)
HCV AB S/CO SERPL IA: <0.1 S/CO RATIO (ref 0–0.9)
HGB BLD-MCNC: 15.5 G/DL (ref 12–15.9)
IMM GRANULOCYTES # BLD AUTO: 0.01 10*3/MM3 (ref 0–0.05)
IMM GRANULOCYTES NFR BLD AUTO: 0.2 % (ref 0–0.5)
LYMPHOCYTES # BLD AUTO: 2.44 10*3/MM3 (ref 0.7–3.1)
LYMPHOCYTES NFR BLD AUTO: 42.3 % (ref 19.6–45.3)
MCH RBC QN AUTO: 30.7 PG (ref 26.6–33)
MCHC RBC AUTO-ENTMCNC: 34.1 G/DL (ref 31.5–35.7)
MCV RBC AUTO: 90.1 FL (ref 79–97)
MONOCYTES # BLD AUTO: 0.41 10*3/MM3 (ref 0.1–0.9)
MONOCYTES NFR BLD AUTO: 7.1 % (ref 5–12)
NEUTROPHILS # BLD AUTO: 2.77 10*3/MM3 (ref 1.7–7)
NEUTROPHILS NFR BLD AUTO: 48 % (ref 42.7–76)
NRBC BLD AUTO-RTO: 0 /100 WBC (ref 0–0.2)
PLATELET # BLD AUTO: 267 10*3/MM3 (ref 140–450)
POTASSIUM SERPL-SCNC: 4.3 MMOL/L (ref 3.5–5.2)
PROT SERPL-MCNC: 7.8 G/DL (ref 6–8.5)
RBC # BLD AUTO: 5.05 10*6/MM3 (ref 3.77–5.28)
SODIUM SERPL-SCNC: 140 MMOL/L (ref 136–145)
VIT B12 SERPL-MCNC: 212 PG/ML (ref 211–946)
WBC # BLD AUTO: 5.77 10*3/MM3 (ref 3.4–10.8)

## 2021-02-02 DIAGNOSIS — M54.50 ACUTE MIDLINE LOW BACK PAIN, UNSPECIFIED WHETHER SCIATICA PRESENT: ICD-10-CM

## 2021-02-02 RX ORDER — PREDNISONE 10 MG/1
TABLET ORAL
Qty: 24 TABLET | Refills: 0 | Status: SHIPPED | OUTPATIENT
Start: 2021-02-02 | End: 2021-06-25

## 2021-02-04 DIAGNOSIS — E53.8 COBALAMIN DEFICIENCY: ICD-10-CM

## 2021-02-04 RX ORDER — LANOLIN ALCOHOL/MO/W.PET/CERES
2000 CREAM (GRAM) TOPICAL DAILY
Qty: 100 TABLET | Refills: 11 | Status: SHIPPED | OUTPATIENT
Start: 2021-02-04 | End: 2022-03-25

## 2021-03-03 DIAGNOSIS — Z23 IMMUNIZATION DUE: ICD-10-CM

## 2021-03-12 ENCOUNTER — APPOINTMENT (OUTPATIENT)
Dept: VACCINE CLINIC | Facility: HOSPITAL | Age: 81
End: 2021-03-12

## 2021-03-13 DIAGNOSIS — I87.2 VENOUS STASIS ULCER OF LEFT CALF LIMITED TO BREAKDOWN OF SKIN WITHOUT VARICOSE VEINS (HCC): ICD-10-CM

## 2021-03-13 DIAGNOSIS — L97.221 VENOUS STASIS ULCER OF LEFT CALF LIMITED TO BREAKDOWN OF SKIN WITHOUT VARICOSE VEINS (HCC): ICD-10-CM

## 2021-03-19 ENCOUNTER — IMMUNIZATION (OUTPATIENT)
Dept: VACCINE CLINIC | Facility: HOSPITAL | Age: 81
End: 2021-03-19

## 2021-03-19 DIAGNOSIS — Z23 IMMUNIZATION DUE: ICD-10-CM

## 2021-03-19 PROCEDURE — 0001A: CPT | Performed by: INTERNAL MEDICINE

## 2021-03-19 PROCEDURE — 91300 HC SARSCOV02 VAC 30MCG/0.3ML IM: CPT | Performed by: INTERNAL MEDICINE

## 2021-03-24 DIAGNOSIS — I27.20 PULMONARY HYPERTENSION (HCC): Primary | ICD-10-CM

## 2021-03-24 RX ORDER — FUROSEMIDE 40 MG/1
TABLET ORAL
Qty: 90 TABLET | Refills: 3 | Status: SHIPPED | OUTPATIENT
Start: 2021-03-24 | End: 2021-03-25 | Stop reason: ALTCHOICE

## 2021-03-25 RX ORDER — FUROSEMIDE 40 MG/1
40 TABLET ORAL DAILY
Qty: 90 TABLET | Refills: 3 | Status: SHIPPED | OUTPATIENT
Start: 2021-03-25 | End: 2022-01-10

## 2021-04-09 ENCOUNTER — IMMUNIZATION (OUTPATIENT)
Dept: VACCINE CLINIC | Facility: HOSPITAL | Age: 81
End: 2021-04-09

## 2021-04-09 PROCEDURE — 91300 HC SARSCOV02 VAC 30MCG/0.3ML IM: CPT | Performed by: INTERNAL MEDICINE

## 2021-04-09 PROCEDURE — 0002A: CPT | Performed by: INTERNAL MEDICINE

## 2021-04-20 DIAGNOSIS — M54.42 CHRONIC LEFT-SIDED LOW BACK PAIN WITH LEFT-SIDED SCIATICA: ICD-10-CM

## 2021-04-20 DIAGNOSIS — G89.29 CHRONIC LEFT-SIDED LOW BACK PAIN WITH LEFT-SIDED SCIATICA: ICD-10-CM

## 2021-04-21 RX ORDER — HYDROCODONE BITARTRATE AND ACETAMINOPHEN 5; 325 MG/1; MG/1
1 TABLET ORAL EVERY 4 HOURS PRN
Qty: 120 TABLET | Refills: 0 | Status: SHIPPED | OUTPATIENT
Start: 2021-04-21 | End: 2021-06-21 | Stop reason: SDUPTHER

## 2021-05-03 ENCOUNTER — TELEPHONE (OUTPATIENT)
Dept: FAMILY MEDICINE CLINIC | Facility: CLINIC | Age: 81
End: 2021-05-03

## 2021-05-03 NOTE — TELEPHONE ENCOUNTER
TRIED TO TRANSFER WAS UNSUCCESSFUL    PATIENT HAS A SORE ON HER BELLY BUTTON THAT WAS BLEEDING DARK BLOOD AND SMELLS. ALSO HAS A YELLOWISH DISCHARGE. I COULD NOT GET HER AN APPOINTMENT UNTIL 05/11/21. SHE REALLY DOES NOT WANT TO WAIT THAT LONG IN CASE IT IS INFECTED.    PLEASE CALL  [phone]

## 2021-05-07 ENCOUNTER — OFFICE VISIT (OUTPATIENT)
Dept: FAMILY MEDICINE CLINIC | Facility: CLINIC | Age: 81
End: 2021-05-07

## 2021-05-07 VITALS
OXYGEN SATURATION: 99 % | BODY MASS INDEX: 41.41 KG/M2 | HEART RATE: 75 BPM | HEIGHT: 62 IN | TEMPERATURE: 97.3 F | DIASTOLIC BLOOD PRESSURE: 80 MMHG | RESPIRATION RATE: 20 BRPM | SYSTOLIC BLOOD PRESSURE: 130 MMHG | WEIGHT: 225 LBS

## 2021-05-07 DIAGNOSIS — M54.16 LUMBAR RADICULOPATHY: ICD-10-CM

## 2021-05-07 DIAGNOSIS — E55.9 VITAMIN D DEFICIENCY: ICD-10-CM

## 2021-05-07 DIAGNOSIS — Q27.30 ARTERIOVENOUS MALFORMATION: ICD-10-CM

## 2021-05-07 DIAGNOSIS — E53.8 COBALAMIN DEFICIENCY: ICD-10-CM

## 2021-05-07 DIAGNOSIS — E66.01 OBESITY, MORBID, BMI 50 OR HIGHER (HCC): ICD-10-CM

## 2021-05-07 DIAGNOSIS — I10 ESSENTIAL HYPERTENSION: ICD-10-CM

## 2021-05-07 DIAGNOSIS — M15.9 GENERALIZED OSTEOARTHRITIS: ICD-10-CM

## 2021-05-07 DIAGNOSIS — Z79.899 HIGH RISK MEDICATION USE: Primary | ICD-10-CM

## 2021-05-07 DIAGNOSIS — I89.0 LYMPHEDEMA OF BOTH LOWER EXTREMITIES: ICD-10-CM

## 2021-05-07 PROCEDURE — 99213 OFFICE O/P EST LOW 20 MIN: CPT | Performed by: FAMILY MEDICINE

## 2021-05-07 NOTE — PROGRESS NOTES
HPI  Tamara Singh is a 81 y.o. female who is here for follow up especially of lymphedema and follow-up of B12 deficiency.  Patient with multiple complaints requesting wheelchairs walkers etc.  All of this was discussed.  Also persistent lymphedema issues discussed and requesting increased Lasix.  Again informed patient issue is circulation and not diuretic therapy will help.  Also do not recommend wheelchair for multiple reasons.  Needs to get up and ambulate as much as possible.  Is requesting a wheelchair with seat so can get out and about more.  All of this was discussed also.  Had recent infection of navel area but resolved with peroxide irrigation.  Is losing some weight which is strongly encouraged.  Do recommend returning to lymphedema clinic for reevaluation.      Review of Systems   Cardiovascular: Positive for leg swelling.   Musculoskeletal: Positive for arthralgias, back pain and gait problem.   Skin: Positive for rash.   All other systems reviewed and are negative.        Past Medical History:   Diagnosis Date   • Arthritis    • Hypertension        Past Surgical History:   Procedure Laterality Date   • CHOLECYSTECTOMY  1974       No family history on file.    Social History     Socioeconomic History   • Marital status: Single     Spouse name: Not on file   • Number of children: Not on file   • Years of education: Not on file   • Highest education level: Not on file   Tobacco Use   • Smoking status: Never Smoker   • Smokeless tobacco: Never Used   Substance and Sexual Activity   • Alcohol use: No   • Drug use: No     Types: Hydrocodone   • Sexual activity: Never       Vitals:    05/07/21 1428   BP: 130/80   Pulse: 75   Resp: 20   Temp: 97.3 °F (36.3 °C)   SpO2: 99%        Body mass index is 41.14 kg/m².      Physical Exam  Vitals and nursing note reviewed.   Constitutional:       General: She is not in acute distress.     Appearance: She is well-developed. She is obese.   HENT:      Head: Normocephalic  and atraumatic.      Nose:      Comments: Patient with mask.  Provider with mask and shield  Eyes:      Conjunctiva/sclera: Conjunctivae normal.      Pupils: Pupils are equal, round, and reactive to light.   Neck:      Thyroid: No thyromegaly.   Cardiovascular:      Rate and Rhythm: Normal rate and regular rhythm.      Heart sounds: Normal heart sounds.   Pulmonary:      Effort: Pulmonary effort is normal. No respiratory distress.      Breath sounds: Normal breath sounds.   Abdominal:      General: There is no distension.      Palpations: There is no mass.      Tenderness: There is no abdominal tenderness.      Hernia: No hernia is present. There is no hernia in the umbilical area.       Musculoskeletal:         General: No tenderness or deformity. Normal range of motion.      Cervical back: Normal range of motion.      Right lower leg: Edema present.      Left lower leg: Edema present.   Lymphadenopathy:      Cervical: No cervical adenopathy.   Skin:     General: Skin is warm and dry.      Coloration: Skin is not pale.      Findings: No rash.   Neurological:      General: No focal deficit present.      Mental Status: She is alert and oriented to person, place, and time.      Motor: No abnormal muscle tone.      Coordination: Coordination normal.   Psychiatric:         Mood and Affect: Mood normal.         Behavior: Behavior normal.         Thought Content: Thought content normal.         Judgment: Judgment normal.           Assessment/Plan    Diagnoses and all orders for this visit:    1. High risk medication use (Primary)    2. Vitamin D deficiency  -     Vitamin D 1,25 Dihydroxy    3. Cobalamin deficiency  -     Vitamin B12    4. Generalized osteoarthritis  -     Walker    5. Lumbar radiculopathy  -     Walker    6. Obesity, morbid, BMI 50 or higher (CMS/HCC)    7. Lymphedema of both lower extremities  -     Ambulatory Referral to Physical Therapy Lymphedema    8. Arteriovenous malformation    9. Essential  hypertension      Patient here for routine follow-up of multiple medical issues some of which are noted above.  Apparently had recent infection navel area but resolved with peroxide irrigations and has returned to normal.  Again discussed limiting treatment options for chronic lymphedema.  Requesting wheelchair or walker etc. as discussed above.  Overall stable on current regimen and continue to recommend weight loss.  Do recommend follow-up appointment for Medicare wellness evaluation in 3 months.    This note includes information entered using a voice recognition dictation system.  Though reviewed, some nonsensible errors may remain.        Answers for HPI/ROS submitted by the patient on 5/5/2021  Please describe your symptoms.: infection in naval and left ankle pain and blood work. balance problems while walking.  Have you had these symptoms before?: Yes  How long have you been having these symptoms?: 1-2 weeks  What is the primary reason for your visit?: Other

## 2021-05-10 ENCOUNTER — TELEPHONE (OUTPATIENT)
Dept: FAMILY MEDICINE CLINIC | Facility: CLINIC | Age: 81
End: 2021-05-10

## 2021-05-10 DIAGNOSIS — M15.9 GENERALIZED OSTEOARTHRITIS: ICD-10-CM

## 2021-05-10 DIAGNOSIS — G89.29 CHRONIC LEFT-SIDED LOW BACK PAIN WITH LEFT-SIDED SCIATICA: Primary | ICD-10-CM

## 2021-05-10 DIAGNOSIS — E66.01 OBESITY, MORBID, BMI 50 OR HIGHER (HCC): ICD-10-CM

## 2021-05-10 DIAGNOSIS — M54.42 CHRONIC LEFT-SIDED LOW BACK PAIN WITH LEFT-SIDED SCIATICA: Primary | ICD-10-CM

## 2021-05-10 LAB
1,25(OH)2D SERPL-MCNC: 38.6 PG/ML (ref 19.9–79.3)
VIT B12 SERPL-MCNC: 1179 PG/ML (ref 232–1245)

## 2021-05-10 NOTE — TELEPHONE ENCOUNTER
Caller: Tamara Singh    Relationship: Self    Best call back number: [phone]    What orders are you requesting (i.e. lab or imaging): WALKER WITH SEAT    In what timeframe would the patient need to come in: ASAP    Where will you receive your lab/imaging services: [address], [place] [address]  Phone: [phone]    Additional notes: PATIENT NEEDS ONE THAT WILL HOLD UP  POUNDS. SHE WEIGHS OVER [address]         ”

## 2021-05-14 RX ORDER — PREDNISONE 10 MG/1
TABLET ORAL
Qty: 24 TABLET | Refills: 0 | Status: SHIPPED | OUTPATIENT
Start: 2021-05-14 | End: 2021-06-25

## 2021-05-16 DIAGNOSIS — F41.9 ANXIETY: ICD-10-CM

## 2021-05-17 RX ORDER — CLONAZEPAM 0.5 MG/1
TABLET ORAL
Qty: 60 TABLET | Refills: 5 | Status: SHIPPED | OUTPATIENT
Start: 2021-05-17 | End: 2022-01-10

## 2021-05-18 DIAGNOSIS — M54.42 CHRONIC LEFT-SIDED LOW BACK PAIN WITH LEFT-SIDED SCIATICA: ICD-10-CM

## 2021-05-18 DIAGNOSIS — G89.29 CHRONIC LEFT-SIDED LOW BACK PAIN WITH LEFT-SIDED SCIATICA: ICD-10-CM

## 2021-05-18 RX ORDER — CARISOPRODOL 350 MG/1
350 TABLET ORAL 4 TIMES DAILY PRN
Qty: 120 TABLET | Refills: 5 | Status: CANCELLED | OUTPATIENT
Start: 2021-05-18

## 2021-05-18 NOTE — TELEPHONE ENCOUNTER
Caller: Tamara Singh    Relationship: Self    Best call back number:     Medication needed:   Requested Prescriptions     Pending Prescriptions Disp Refills   • carisoprodol (SOMA) 350 MG tablet 120 tablet 5     Sig: Take 1 tablet by mouth 4 (Four) Times a Day As Needed for Muscle Spasms.       When do you need the refill by:     What additional details did the patient provide when requesting the medication: PATIENTS INSURANCE WILL NOT PAY FOR THIS.  THEY ARE ASKING FOR A PREAUTHORIZATION  FROM DR CARIAS ABOUT THIS.      Does the patient have less than a 3 day supply:  [] Yes  [x] No    What is the patient's preferred pharmacy:  [facility] [address] [facility]  [phone]

## 2021-05-18 NOTE — TELEPHONE ENCOUNTER
Called pt and notified her I did PA and it has been approved.  Her pharmacy needs to resend it though insurance company. Hub please inform patient

## 2021-06-05 ENCOUNTER — HOSPITAL ENCOUNTER (EMERGENCY)
Facility: HOSPITAL | Age: 81
Discharge: HOME | End: 2021-06-06
Attending: EMERGENCY MEDICINE | Admitting: EMERGENCY MEDICINE

## 2021-06-05 DIAGNOSIS — I48.91 ATRIAL FIBRILLATION, UNSPECIFIED TYPE (HCC): ICD-10-CM

## 2021-06-05 DIAGNOSIS — M54.9 MUSCULOSKELETAL BACK PAIN: Primary | ICD-10-CM

## 2021-06-05 PROCEDURE — 93005 ELECTROCARDIOGRAM TRACING: CPT | Performed by: EMERGENCY MEDICINE

## 2021-06-05 PROCEDURE — 93010 ELECTROCARDIOGRAM REPORT: CPT | Performed by: INTERNAL MEDICINE

## 2021-06-05 PROCEDURE — 99284 EMERGENCY DEPT VISIT MOD MDM: CPT

## 2021-06-05 RX ORDER — SODIUM CHLORIDE 0.9 % (FLUSH) 0.9 %
10 SYRINGE (ML) INJECTION AS NEEDED
Status: DISCONTINUED | OUTPATIENT
Start: 2021-06-05 | End: 2021-06-06 | Stop reason: HOSPADM

## 2021-06-05 RX ORDER — OXYCODONE HYDROCHLORIDE AND ACETAMINOPHEN 5; 325 MG/1; MG/1
1 TABLET ORAL ONCE
Status: COMPLETED | OUTPATIENT
Start: 2021-06-05 | End: 2021-06-06

## 2021-06-06 ENCOUNTER — APPOINTMENT (OUTPATIENT)
Dept: CT IMAGING | Facility: HOSPITAL | Age: 81
End: 2021-06-06

## 2021-06-06 VITALS
DIASTOLIC BLOOD PRESSURE: 72 MMHG | HEART RATE: 64 BPM | BODY MASS INDEX: 41.41 KG/M2 | RESPIRATION RATE: 16 BRPM | HEIGHT: 62 IN | WEIGHT: 225 LBS | SYSTOLIC BLOOD PRESSURE: 150 MMHG | OXYGEN SATURATION: 98 % | TEMPERATURE: 98.4 F

## 2021-06-06 LAB
ALBUMIN SERPL-MCNC: 4 G/DL (ref 3.5–5.2)
ALBUMIN/GLOB SERPL: 1.3 G/DL
ALP SERPL-CCNC: 81 U/L (ref 39–117)
ALT SERPL W P-5'-P-CCNC: 13 U/L (ref 1–33)
ANION GAP SERPL CALCULATED.3IONS-SCNC: 8.2 MMOL/L (ref 5–15)
AST SERPL-CCNC: 20 U/L (ref 1–32)
BASOPHILS # BLD AUTO: 0.02 10*3/MM3 (ref 0–0.2)
BASOPHILS NFR BLD AUTO: 0.3 % (ref 0–1.5)
BILIRUB SERPL-MCNC: 0.6 MG/DL (ref 0–1.2)
BILIRUB UR QL STRIP: NEGATIVE
BUN SERPL-MCNC: 16 MG/DL (ref 8–23)
BUN/CREAT SERPL: 23.2 (ref 7–25)
CALCIUM SPEC-SCNC: 9.5 MG/DL (ref 8.6–10.5)
CHLORIDE SERPL-SCNC: 105 MMOL/L (ref 98–107)
CLARITY UR: CLEAR
CO2 SERPL-SCNC: 27.8 MMOL/L (ref 22–29)
COLOR UR: YELLOW
CREAT SERPL-MCNC: 0.69 MG/DL (ref 0.57–1)
DEPRECATED RDW RBC AUTO: 48.5 FL (ref 37–54)
EOSINOPHIL # BLD AUTO: 0.11 10*3/MM3 (ref 0–0.4)
EOSINOPHIL NFR BLD AUTO: 1.7 % (ref 0.3–6.2)
ERYTHROCYTE [DISTWIDTH] IN BLOOD BY AUTOMATED COUNT: 14.1 % (ref 12.3–15.4)
GFR SERPL CREATININE-BSD FRML MDRD: 82 ML/MIN/1.73
GLOBULIN UR ELPH-MCNC: 3.1 GM/DL
GLUCOSE SERPL-MCNC: 101 MG/DL (ref 65–99)
GLUCOSE UR STRIP-MCNC: NEGATIVE MG/DL
HCT VFR BLD AUTO: 43 % (ref 34–46.6)
HGB BLD-MCNC: 14.4 G/DL (ref 12–15.9)
HGB UR QL STRIP.AUTO: NEGATIVE
IMM GRANULOCYTES # BLD AUTO: 0.02 10*3/MM3 (ref 0–0.05)
IMM GRANULOCYTES NFR BLD AUTO: 0.3 % (ref 0–0.5)
KETONES UR QL STRIP: NEGATIVE
LEUKOCYTE ESTERASE UR QL STRIP.AUTO: NEGATIVE
LYMPHOCYTES # BLD AUTO: 2.36 10*3/MM3 (ref 0.7–3.1)
LYMPHOCYTES NFR BLD AUTO: 36.4 % (ref 19.6–45.3)
MCH RBC QN AUTO: 31.5 PG (ref 26.6–33)
MCHC RBC AUTO-ENTMCNC: 33.5 G/DL (ref 31.5–35.7)
MCV RBC AUTO: 94.1 FL (ref 79–97)
MONOCYTES # BLD AUTO: 0.62 10*3/MM3 (ref 0.1–0.9)
MONOCYTES NFR BLD AUTO: 9.6 % (ref 5–12)
NEUTROPHILS NFR BLD AUTO: 3.36 10*3/MM3 (ref 1.7–7)
NEUTROPHILS NFR BLD AUTO: 51.7 % (ref 42.7–76)
NITRITE UR QL STRIP: NEGATIVE
NRBC BLD AUTO-RTO: 0 /100 WBC (ref 0–0.2)
PH UR STRIP.AUTO: 8 [PH] (ref 5–8)
PLATELET # BLD AUTO: 252 10*3/MM3 (ref 140–450)
PMV BLD AUTO: 8.9 FL (ref 6–12)
POTASSIUM SERPL-SCNC: 3.7 MMOL/L (ref 3.5–5.2)
PROT SERPL-MCNC: 7.1 G/DL (ref 6–8.5)
PROT UR QL STRIP: NEGATIVE
QT INTERVAL: 416 MS
QT INTERVAL: 436 MS
RBC # BLD AUTO: 4.57 10*6/MM3 (ref 3.77–5.28)
SODIUM SERPL-SCNC: 141 MMOL/L (ref 136–145)
SP GR UR STRIP: 1.02 (ref 1–1.03)
TROPONIN T SERPL-MCNC: <0.01 NG/ML (ref 0–0.03)
UROBILINOGEN UR QL STRIP: NORMAL
WBC # BLD AUTO: 6.49 10*3/MM3 (ref 3.4–10.8)

## 2021-06-06 PROCEDURE — 80053 COMPREHEN METABOLIC PANEL: CPT | Performed by: EMERGENCY MEDICINE

## 2021-06-06 PROCEDURE — 93005 ELECTROCARDIOGRAM TRACING: CPT | Performed by: EMERGENCY MEDICINE

## 2021-06-06 PROCEDURE — 93010 ELECTROCARDIOGRAM REPORT: CPT | Performed by: INTERNAL MEDICINE

## 2021-06-06 PROCEDURE — 85025 COMPLETE CBC W/AUTO DIFF WBC: CPT | Performed by: EMERGENCY MEDICINE

## 2021-06-06 PROCEDURE — 84484 ASSAY OF TROPONIN QUANT: CPT | Performed by: EMERGENCY MEDICINE

## 2021-06-06 PROCEDURE — 72128 CT CHEST SPINE W/O DYE: CPT

## 2021-06-06 PROCEDURE — 81003 URINALYSIS AUTO W/O SCOPE: CPT | Performed by: EMERGENCY MEDICINE

## 2021-06-06 RX ORDER — RIVAROXABAN 15 MG-20MG
KIT ORAL
Qty: 1 EACH | Refills: 0 | Status: SHIPPED | OUTPATIENT
Start: 2021-06-06 | End: 2021-08-13

## 2021-06-06 RX ADMIN — OXYCODONE HYDROCHLORIDE AND ACETAMINOPHEN 1 TABLET: 5; 325 TABLET ORAL at 00:10

## 2021-06-06 NOTE — ED TRIAGE NOTES
Pt was brought in by ems from home for back pain x2days. Pt has hx of chronic back pain and just finished prednisone. Pt states that pain is now higher. Increased pain with range of motion. No relief with home pain medications    Pt wearing mask on arrival. Staff wearing mask and goggles at time of triage.

## 2021-06-06 NOTE — ED PROVIDER NOTES
EMERGENCY DEPARTMENT ENCOUNTER    Room Number:  09/09  Date of encounter:  6/6/2021  PCP: Dawson Sauceda MD  Historian: Patient      HPI:  Chief Complaint: Back pain  A complete HPI/ROS/PMH/PSH/SH/FH are unobtainable due to: None    Context: Tamara Singh is a 81 y.o. female who presents to the ED c/o lower thoracic back pain for the last 2 days.  Patient has long history of back pain states that this is a little bit higher than is normal for her.  States that the pain radiates down into a more normal distribution.  No tingling no numbness.  No abdominal pain no chest pain no shortness of breath no fevers no chills.  Patient states pain with movement twisting bending    PAST MEDICAL HISTORY  Active Ambulatory Problems     Diagnosis Date Noted   • Lumbar radiculopathy 04/06/2016   • Anxiety 04/06/2016   • Secondary polycythemia 04/06/2016   • Arteriovenous malformation 04/06/2016   • Venous stasis dermatitis 04/06/2016   • Eczema 04/06/2016   • Hypertension 04/06/2016   • Hypoglycemia 04/06/2016   • Knee pain 04/06/2016   • Low back pain 04/06/2016   • Lymphedema 04/06/2016   • Obesity, morbid, BMI 50 or higher (CMS/HCC) 04/06/2016   • Obstructive sleep apnea syndrome 04/06/2016   • Pain in thoracic spine 04/06/2016   • Varicose veins of lower extremity with inflammation 04/06/2016   • Cobalamin deficiency 04/06/2016   • Vitamin D deficiency 04/06/2016   • Muscle spasms of both lower extremities 07/28/2016   • Generalized osteoarthritis 04/17/2019   • Pulmonary hypertension (CMS/HCC) 02/05/2020   • High risk medication use 04/27/2020     Resolved Ambulatory Problems     Diagnosis Date Noted   • No Resolved Ambulatory Problems     Past Medical History:   Diagnosis Date   • Arthritis          PAST SURGICAL HISTORY  Past Surgical History:   Procedure Laterality Date   • CHOLECYSTECTOMY  1974         FAMILY HISTORY  No family history on file.      SOCIAL HISTORY  Social History     Socioeconomic History   • Marital  status: Single     Spouse name: Not on file   • Number of children: Not on file   • Years of education: Not on file   • Highest education level: Not on file   Tobacco Use   • Smoking status: Never Smoker   • Smokeless tobacco: Never Used   Substance and Sexual Activity   • Alcohol use: No   • Drug use: No     Types: Hydrocodone   • Sexual activity: Never         ALLERGIES  Moxifloxacin and Penicillins        REVIEW OF SYSTEMS  Review of Systems     All systems reviewed and negative except for those discussed in HPI.       PHYSICAL EXAM    I have reviewed the triage vital signs and nursing notes.    ED Triage Vitals   Temp Heart Rate Resp BP SpO2   06/05/21 2140 06/05/21 2140 06/05/21 2140 06/05/21 2140 06/05/21 2140   98.1 °F (36.7 °C) 82 18 164/86 100 %      Temp src Heart Rate Source Patient Position BP Location FiO2 (%)   06/05/21 2140 06/05/21 2140 06/05/21 2349 06/05/21 2349 --   Oral Monitor (P) Sitting (P) Right arm        Physical Exam  GENERAL: not distressed  HENT: nares patent  EYES: no scleral icterus  CV: regular rhythm, regular rate  RESPIRATORY: normal effort  ABDOMEN: soft  MUSCULOSKELETAL: no deformity -diffuse tenderness over lower T-spine no focal bony tenderness no warmth or erythema appreciated  NEURO: alert, moves all extremities, follows commands  SKIN: warm, dry        LAB RESULTS  Recent Results (from the past 24 hour(s))   ECG 12 Lead    Collection Time: 06/05/21 11:57 PM   Result Value Ref Range    QT Interval 416 ms   CBC Auto Differential    Collection Time: 06/06/21 12:14 AM    Specimen: Blood   Result Value Ref Range    WBC 6.49 3.40 - 10.80 10*3/mm3    RBC 4.57 3.77 - 5.28 10*6/mm3    Hemoglobin 14.4 12.0 - 15.9 g/dL    Hematocrit 43.0 34.0 - 46.6 %    MCV 94.1 79.0 - 97.0 fL    MCH 31.5 26.6 - 33.0 pg    MCHC 33.5 31.5 - 35.7 g/dL    RDW 14.1 12.3 - 15.4 %    RDW-SD 48.5 37.0 - 54.0 fl    MPV 8.9 6.0 - 12.0 fL    Platelets 252 140 - 450 10*3/mm3    Neutrophil % 51.7 42.7 - 76.0 %     Lymphocyte % 36.4 19.6 - 45.3 %    Monocyte % 9.6 5.0 - 12.0 %    Eosinophil % 1.7 0.3 - 6.2 %    Basophil % 0.3 0.0 - 1.5 %    Immature Grans % 0.3 0.0 - 0.5 %    Neutrophils, Absolute 3.36 1.70 - 7.00 10*3/mm3    Lymphocytes, Absolute 2.36 0.70 - 3.10 10*3/mm3    Monocytes, Absolute 0.62 0.10 - 0.90 10*3/mm3    Eosinophils, Absolute 0.11 0.00 - 0.40 10*3/mm3    Basophils, Absolute 0.02 0.00 - 0.20 10*3/mm3    Immature Grans, Absolute 0.02 0.00 - 0.05 10*3/mm3    nRBC 0.0 0.0 - 0.2 /100 WBC   Comprehensive Metabolic Panel    Collection Time: 06/06/21 12:15 AM    Specimen: Blood   Result Value Ref Range    Glucose 101 (H) 65 - 99 mg/dL    BUN 16 8 - 23 mg/dL    Creatinine 0.69 0.57 - 1.00 mg/dL    Sodium 141 136 - 145 mmol/L    Potassium 3.7 3.5 - 5.2 mmol/L    Chloride 105 98 - 107 mmol/L    CO2 27.8 22.0 - 29.0 mmol/L    Calcium 9.5 8.6 - 10.5 mg/dL    Total Protein 7.1 6.0 - 8.5 g/dL    Albumin 4.00 3.50 - 5.20 g/dL    ALT (SGPT) 13 1 - 33 U/L    AST (SGOT) 20 1 - 32 U/L    Alkaline Phosphatase 81 39 - 117 U/L    Total Bilirubin 0.6 0.0 - 1.2 mg/dL    eGFR Non African Amer 82 >60 mL/min/1.73    Globulin 3.1 gm/dL    A/G Ratio 1.3 g/dL    BUN/Creatinine Ratio 23.2 7.0 - 25.0    Anion Gap 8.2 5.0 - 15.0 mmol/L   Troponin    Collection Time: 06/06/21 12:15 AM    Specimen: Blood   Result Value Ref Range    Troponin T <0.010 0.000 - 0.030 ng/mL   ECG 12 Lead    Collection Time: 06/06/21  1:00 AM   Result Value Ref Range    QT Interval 436 ms   Urinalysis With Microscopic If Indicated (No Culture) - Urine, Clean Catch    Collection Time: 06/06/21  2:12 AM    Specimen: Urine, Clean Catch   Result Value Ref Range    Color, UA Yellow Yellow, Straw    Appearance, UA Clear Clear    pH, UA 8.0 5.0 - 8.0    Specific Gravity, UA 1.020 1.005 - 1.030    Glucose, UA Negative Negative    Ketones, UA Negative Negative    Bilirubin, UA Negative Negative    Blood, UA Negative Negative    Protein, UA Negative Negative    Leuk  Esterase, UA Negative Negative    Nitrite, UA Negative Negative    Urobilinogen, UA 1.0 E.U./dL 0.2 - 1.0 E.U./dL       Ordered the above labs and independently reviewed the results.        RADIOLOGY  CT Thoracic Spine Without Contrast    Result Date: 6/6/2021  Patient: REID CONROY  Time Out: 00:51 Exam(s): CT T SPINE EXAM:   CT Thoracic Spine Without Intravenous Contrast CLINICAL HISTORY:    Reason for exam: back pain. TECHNIQUE:   Axial computed tomography images of the thoracic spine without intravenous contrast.  CTDI is 50.6 mGy and DLP is 1995.6 mGy-cm.  This CT exam was performed according to the principle of ALARA (As Low As Reasonably Achievable) by using one or more of the following dose reduction techniques: automated exposure control, adjustment of the mA and or kV according to patient size, and or use of iterative reconstruction technique. COMPARISON:   No relevant prior studies available. FINDINGS:   Vertebrae:  No acute fracture. No subluxation.  Mild scoliosis.   Discs spinal canal neural foramina: Multilevel degenerative disc disease with mild spinal canal stenosis from T10-L2.   Soft tissues:  Unremarkable. IMPRESSION:       No acute findings.     Electronically signed by Jose Martinez MD on 06-06-21 at 0051      I ordered the above noted radiological studies. Reviewed by me and discussed with radiologist.  See dictation for official radiology interpretation.      PROCEDURES    Procedures      MEDICATIONS GIVEN IN ER    Medications   sodium chloride 0.9 % flush 10 mL (has no administration in time range)   oxyCODONE-acetaminophen (PERCOCET) 5-325 MG per tablet 1 tablet (1 tablet Oral Given 6/6/21 0010)         PROGRESS, DATA ANALYSIS, CONSULTS, AND MEDICAL DECISION MAKING    All labs have been independently reviewed by me.  All radiology studies have been reviewed by me and discussed with radiologist dictating the report.   EKG's independently viewed and interpreted by me.  Discussion below  represents my analysis of pertinent findings related to patient's condition, differential diagnosis, treatment plan and final disposition.        ED Course as of Jun 06 0605   Sun Jun 06, 2021   0008 EKG          EKG time: 2357  Rhythm/Rate: A. fib rate of 68  P waves and MA: A. fib  QRS, axis: Narrow irregular  ST and T waves: Normal    Interpreted Contemporaneously by me, independently viewed  Rate improved from prior EKG on 10/17/2018     [TJ]   0011 Chads score: 2    [TJ]   0229 Discussed with Dr. Colby - will start anticoagulation - fu as outpt.    [TJ]      ED Course User Index  [TJ] Eliu Saavedra MD           PPE: Both the patient and I wore a surgical mask throughout the entire patient encounter. I wore protective goggles.     AS OF 06:05 EDT VITALS:    BP - 150/72  HR - 64  TEMP - 98.4 °F (36.9 °C) (Tympanic)  O2 SATS - 98%        DIAGNOSIS  Final diagnoses:   Musculoskeletal back pain   Atrial fibrillation, unspecified type (CMS/[facility])         DISPOSITION  Discharge           Eliu Saavedra MD  06/06/21 0605

## 2021-06-07 ENCOUNTER — APPOINTMENT (OUTPATIENT)
Dept: MAMMOGRAPHY | Facility: HOSPITAL | Age: 81
End: 2021-06-07

## 2021-06-07 DIAGNOSIS — L97.221 VENOUS STASIS ULCER OF LEFT CALF LIMITED TO BREAKDOWN OF SKIN WITHOUT VARICOSE VEINS (HCC): ICD-10-CM

## 2021-06-07 DIAGNOSIS — I87.2 VENOUS STASIS ULCER OF LEFT CALF LIMITED TO BREAKDOWN OF SKIN WITHOUT VARICOSE VEINS (HCC): ICD-10-CM

## 2021-06-21 DIAGNOSIS — M54.42 CHRONIC LEFT-SIDED LOW BACK PAIN WITH LEFT-SIDED SCIATICA: ICD-10-CM

## 2021-06-21 DIAGNOSIS — G89.29 CHRONIC LEFT-SIDED LOW BACK PAIN WITH LEFT-SIDED SCIATICA: ICD-10-CM

## 2021-06-21 RX ORDER — HYDROCODONE BITARTRATE AND ACETAMINOPHEN 5; 325 MG/1; MG/1
1 TABLET ORAL EVERY 4 HOURS PRN
Qty: 120 TABLET | Refills: 0 | Status: SHIPPED | OUTPATIENT
Start: 2021-06-21 | End: 2021-09-13 | Stop reason: SDUPTHER

## 2021-06-25 ENCOUNTER — OFFICE VISIT (OUTPATIENT)
Dept: CARDIOLOGY | Facility: CLINIC | Age: 81
End: 2021-06-25

## 2021-06-25 VITALS
WEIGHT: 216 LBS | DIASTOLIC BLOOD PRESSURE: 86 MMHG | HEART RATE: 82 BPM | HEIGHT: 62 IN | BODY MASS INDEX: 39.75 KG/M2 | SYSTOLIC BLOOD PRESSURE: 142 MMHG

## 2021-06-25 DIAGNOSIS — I89.0 LYMPHEDEMA: ICD-10-CM

## 2021-06-25 DIAGNOSIS — I48.19 ATRIAL FIBRILLATION, PERSISTENT (HCC): ICD-10-CM

## 2021-06-25 DIAGNOSIS — G47.33 OBSTRUCTIVE SLEEP APNEA SYNDROME: ICD-10-CM

## 2021-06-25 DIAGNOSIS — E66.01 OBESITY, MORBID, BMI 50 OR HIGHER (HCC): Primary | ICD-10-CM

## 2021-06-25 DIAGNOSIS — I27.20 PULMONARY HYPERTENSION (HCC): ICD-10-CM

## 2021-06-25 PROCEDURE — 93000 ELECTROCARDIOGRAM COMPLETE: CPT | Performed by: INTERNAL MEDICINE

## 2021-06-25 PROCEDURE — 99204 OFFICE O/P NEW MOD 45 MIN: CPT | Performed by: INTERNAL MEDICINE

## 2021-06-25 NOTE — PROGRESS NOTES
Subjective:     Encounter Date:06/25/21      Patient ID: Tamara Singh is a 81 y.o. female.    Chief Complaint:  History of Present Illness    Dear Dr. Sauceda,    I had the pleasure of seeing this patient in the office today for initial evaluation and consultation.  I appreciate that you sent her in to see us.  They come in today to be seen for newly discovered persistent atrial fibrillation.    Patient was recently in the emergency room.  She came in for back pain.  While there she was found to be in atrial fibrillation which was a new finding.    She could not feel the atrial fibrillation.  She does not feel any arrhythmia.  Rate was controlled in the emergency room on no rate control medication.  She does have a history of chronic lymphedema.  She also has a history of sleep apnea but is not currently using CPAP.  She thinks is probably been 3 years since she was on CPAP.  She had lost some weight and so she thought she may not need it.  She denies any dizziness or lightheadedness.  No chest pain or chest discomfort.  She denies any unusual fatigue.    This patient has no known cardiac history.  This patient has no history of coronary artery disease, congestive heart failure, rheumatic fever, rheumatic heart disease, congenital heart disease or heart murmur.  This patient has never required invasive cardiovascular evaluation.        The following portions of the patient's history were reviewed and updated as appropriate: allergies, current medications, past family history, past medical history, past social history, past surgical history and problem list.      ECG 12 Lead    Date/Time: 6/25/2021 10:48 AM  Performed by: Evens Miller III, MD  Authorized by: Evens Miller III, MD   Comparison: compared with previous ECG   Similar to previous ECG  Rhythm: atrial fibrillation  Rate: normal  Conduction: conduction normal  QRS axis: normal  Other findings: non-specific ST-T wave changes    Clinical impression:  "abnormal EKG               Objective:     Vitals:    06/25/21 0944   BP: 142/86   Pulse: 82   Weight: 98 kg (216 lb)   Height: 157.5 cm (62\")     Body mass index is 39.51 kg/m².      Vitals reviewed.   Constitutional:       General: Not in acute distress.     Appearance: Well-developed. Not diaphoretic.   Eyes:      General:         Right eye: No discharge.         Left eye: No discharge.      Conjunctiva/sclera: Conjunctivae normal.      Pupils: Pupils are equal, round, and reactive to light.   HENT:      Head: Normocephalic and atraumatic.      Nose: Nose normal.   Neck:      Thyroid: No thyromegaly.      Trachea: No tracheal deviation.      Lymphadenopathy: No cervical adenopathy.   Pulmonary:      Effort: Pulmonary effort is normal. No respiratory distress.      Breath sounds: Normal breath sounds. No stridor.   Chest:      Chest wall: Not tender to palpatation.   Cardiovascular:      Normal rate. Regular rhythm.      Murmurs: There is no murmur.      . No S3 gallop. No click. No rub.   Pulses:     Intact distal pulses.   Edema:     Peripheral edema absent.   Abdominal:      General: Bowel sounds are normal. There is no distension.      Palpations: Abdomen is soft. There is no abdominal mass.      Tenderness: There is no abdominal tenderness. There is no guarding or rebound.   Musculoskeletal: Normal range of motion.         General: No tenderness or deformity.      Cervical back: Normal range of motion and neck supple. Skin:     General: Skin is warm and dry.      Findings: No erythema or rash.   Neurological:      Mental Status: Alert and oriented to person, place, and time.      Deep Tendon Reflexes: Reflexes are normal and symmetric.   Psychiatric:         Thought Content: Thought content normal.         Data and records reviewed:     Lab Results   Component Value Date    GLUCOSE 101 (H) 06/06/2021    BUN 16 06/06/2021    CREATININE 0.69 06/06/2021    EGFRIFNONA 82 06/06/2021    EGFRIFAFRI 96 01/25/2021    " BCR 23.2 06/06/2021    K 3.7 06/06/2021    CO2 27.8 06/06/2021    CALCIUM 9.5 06/06/2021    PROTENTOTREF 7.8 01/25/2021    ALBUMIN 4.00 06/06/2021    LABIL2 1.5 01/25/2021    AST 20 06/06/2021    ALT 13 06/06/2021     No results found for: CHOL  No results found for: TRIG  No results found for: HDL  No results found for: LDL  No results found for: VLDL  No results found for: LDLHDL  CBC    CBC 7/6/20 1/25/21 6/6/21   WBC 7.7 5.77 6.49   RBC 4.82 5.05 4.57   Hemoglobin 14.6 15.5 14.4   Hematocrit 44.0 45.5 43.0   MCV 91 90.1 94.1   MCH 30.3 30.7 31.5   MCHC 33.2 34.1 33.5   RDW 13.4 13.8 14.1   Platelets 305 267 252           CT Thoracic Spine Without Contrast    Result Date: 6/6/2021  Electronically signed by Jose Martinez MD on 06-06-21 at 0051            Assessment:          Diagnosis Plan   1. Obesity, morbid, BMI 50 or higher (CMS/HCC)  Adult Transthoracic Echo Complete W/ Cont if Necessary Per Protocol    Holter Monitor - 24 Hour    Ambulatory Referral to Sleep Medicine   2. Pulmonary hypertension (CMS/HCC)  Adult Transthoracic Echo Complete W/ Cont if Necessary Per Protocol    Holter Monitor - 24 Hour    Ambulatory Referral to Sleep Medicine   3. Obstructive sleep apnea syndrome  Adult Transthoracic Echo Complete W/ Cont if Necessary Per Protocol    Holter Monitor - 24 Hour    Ambulatory Referral to Sleep Medicine   4. Lymphedema  Adult Transthoracic Echo Complete W/ Cont if Necessary Per Protocol    Holter Monitor - 24 Hour    Ambulatory Referral to Sleep Medicine   5. Atrial fibrillation, persistent (CMS/HCC)  Adult Transthoracic Echo Complete W/ Cont if Necessary Per Protocol    Holter Monitor - 24 Hour    Ambulatory Referral to Sleep Medicine          Plan:       1.  Persistent atrial fibrillation-we will place a Holter monitor.  Patient is on Xarelto, will continue that for now  2.  Obstructive sleep apnea, not on CPAP, no recent evaluation, we will refer to sleep medicine  3.  Dyspnea, new onset A.  fib, we will obtain an echocardiogram  4.  Chronic lymphedema-she also has listed pulmonary hypertension as a diagnosis but states he sees Dr. Sy for this.  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  Please don't hesitate to call if I can be of assistance in any way.      Current Outpatient Medications:   •  amLODIPine-benazepril (LOTREL) 10-40 MG per capsule, TAKE ONE CAPSULE BY MOUTH EVERY DAY, Disp: 90 capsule, Rfl: 3  •  carisoprodol (SOMA) 350 MG tablet, Take 1 tablet by mouth 4 (Four) Times a Day As Needed for Muscle Spasms., Disp: 120 tablet, Rfl: 5  •  clonazePAM (KlonoPIN) 0.5 MG tablet, TAKE 1 TABLET BY MOUTH TWICE A DAY, Disp: 60 tablet, Rfl: 5  •  furosemide (LASIX) 40 MG tablet, Take 1 tablet by mouth Daily., Disp: 90 tablet, Rfl: 3  •  HYDROcodone-acetaminophen (NORCO) 5-325 MG per tablet, Take 1 tablet by mouth Every 4 (Four) Hours As Needed for Moderate Pain  or Severe Pain ., Disp: 120 tablet, Rfl: 0  •  ketorolac (ACULAR) 0.5 % ophthalmic solution, INSTILL 1 DROP INTO LEFT EYE 4 TIMES A DAY, Disp: , Rfl: 3  •  PROAIR  (90 BASE) MCG/ACT inhaler, INHALE 2 PUFFS BY MOUTH 4 TIMES DAILY AS NEEDED, Disp: 1 inhaler, Rfl: 5  •  Rivaroxaban (Xarelto Starter Pack) tablet therapy pack starter pack, Take one 15 mg tablet twice daily with food for 21 days.  Followed by one 20 mg tablet by mouth once daily with food. Take as directed, Disp: 1 each, Rfl: 0  •  silver sulfadiazine (SILVADENE, SSD) 1 % cream, APPLY TOPICALLY TO THE APPROPRIATE AREA AS DIRECTED 2 (TWO) TIMES A DAY. *CM:I87.2, Disp: 85 g, Rfl: 2  •  vitamin B-12 (CYANOCOBALAMIN) 1000 MCG tablet, Take 2 tablets by mouth Daily., Disp: 100 tablet, Rfl: 11  •  Vitamin D, Cholecalciferol, 50 MCG (2000 UT)  capsule, Take 1 tablet by mouth Daily., Disp: , Rfl:          No follow-ups on file.

## 2021-06-28 ENCOUNTER — OFFICE VISIT (OUTPATIENT)
Dept: FAMILY MEDICINE CLINIC | Facility: CLINIC | Age: 81
End: 2021-06-28

## 2021-06-28 VITALS
HEART RATE: 60 BPM | HEIGHT: 62 IN | OXYGEN SATURATION: 93 % | WEIGHT: 215.4 LBS | SYSTOLIC BLOOD PRESSURE: 110 MMHG | RESPIRATION RATE: 20 BRPM | BODY MASS INDEX: 39.64 KG/M2 | DIASTOLIC BLOOD PRESSURE: 78 MMHG | TEMPERATURE: 97.1 F

## 2021-06-28 DIAGNOSIS — M54.16 LUMBAR RADICULOPATHY: ICD-10-CM

## 2021-06-28 DIAGNOSIS — M15.9 GENERALIZED OSTEOARTHRITIS: ICD-10-CM

## 2021-06-28 DIAGNOSIS — G47.33 OSA (OBSTRUCTIVE SLEEP APNEA): ICD-10-CM

## 2021-06-28 DIAGNOSIS — I48.91 ATRIAL FIBRILLATION, UNSPECIFIED TYPE (HCC): ICD-10-CM

## 2021-06-28 DIAGNOSIS — I27.20 PULMONARY HYPERTENSION (HCC): Primary | ICD-10-CM

## 2021-06-28 DIAGNOSIS — Z79.01 ANTICOAGULATED: ICD-10-CM

## 2021-06-28 DIAGNOSIS — I87.2 VENOUS STASIS DERMATITIS OF BOTH LOWER EXTREMITIES: ICD-10-CM

## 2021-06-28 DIAGNOSIS — E66.01 OBESITY, MORBID, BMI 50 OR HIGHER (HCC): ICD-10-CM

## 2021-06-28 DIAGNOSIS — G47.33 OBSTRUCTIVE SLEEP APNEA SYNDROME: ICD-10-CM

## 2021-06-28 PROCEDURE — 99213 OFFICE O/P EST LOW 20 MIN: CPT | Performed by: FAMILY MEDICINE

## 2021-06-28 RX ORDER — ANTIOX #8/OM3/DHA/EPA/LUT/ZEAX 250-2.5 MG
1 CAPSULE ORAL 2 TIMES DAILY
COMMUNITY
Start: 2017-10-22 | End: 2023-03-08

## 2021-06-28 NOTE — PROGRESS NOTES
HPI  Tamara Singh is a 81 y.o. female who is here for follow up of multiple ongoing medical issues.  Recent emergency room visit with tachycardia and diagnosed as having atrial fibrillation.  Was placed on anticoagulant therapy and referred to cardiologist for further evaluation.  Complicated by pulmonary issues as noted above.  Continues with recurrent back pain and also significant chronic edema of ankles and feet.      Review of Systems   Constitutional: Negative for fever.   Cardiovascular: Negative for chest pain.   Gastrointestinal: Negative for abdominal pain.   Genitourinary: Negative for dysuria and pelvic pain.   Musculoskeletal: Positive for back pain.   Neurological: Positive for weakness and numbness. Negative for headaches.         Past Medical History:   Diagnosis Date   • Arthritis    • Hypertension        Past Surgical History:   Procedure Laterality Date   • CHOLECYSTECTOMY  1974       Family History   Problem Relation Age of Onset   • Heart attack Mother 86   • Stroke Mother    • Heart disease Mother 80   • Hypertension Mother    • Heart attack Brother 70       Social History     Socioeconomic History   • Marital status: Single     Spouse name: Not on file   • Number of children: Not on file   • Years of education: Not on file   • Highest education level: Not on file   Tobacco Use   • Smoking status: Never Smoker   • Smokeless tobacco: Never Used   Substance and Sexual Activity   • Alcohol use: No   • Drug use: No     Types: Hydrocodone   • Sexual activity: Never       Vitals:    06/28/21 1508   BP: 110/78   Pulse: 60   Resp: 20   Temp: 97.1 °F (36.2 °C)   SpO2: 93%        Body mass index is 39.4 kg/m².      Physical Exam  Vitals and nursing note reviewed.   Constitutional:       General: She is not in acute distress.     Appearance: She is well-developed.   HENT:      Head: Normocephalic and atraumatic.   Eyes:      Extraocular Movements: Extraocular movements intact.      Conjunctiva/sclera:  Conjunctivae normal.      Pupils: Pupils are equal, round, and reactive to light.   Neck:      Thyroid: No thyromegaly.   Cardiovascular:      Rate and Rhythm: Normal rate and regular rhythm.      Heart sounds: Normal heart sounds.   Pulmonary:      Effort: Pulmonary effort is normal. No respiratory distress.      Breath sounds: Normal breath sounds.   Abdominal:      General: There is no distension.      Palpations: There is no mass.      Tenderness: There is no abdominal tenderness.      Hernia: No hernia is present.   Musculoskeletal:         General: No tenderness or deformity. Normal range of motion.      Cervical back: Normal range of motion.      Right lower leg: Edema present.      Left lower leg: Edema present.   Lymphadenopathy:      Cervical: No cervical adenopathy.   Skin:     General: Skin is warm and dry.      Coloration: Skin is not pale.      Findings: Erythema and rash present.   Neurological:      Mental Status: She is alert and oriented to person, place, and time.      Motor: No abnormal muscle tone.      Coordination: Coordination normal.   Psychiatric:         Mood and Affect: Mood normal.         Behavior: Behavior normal.         Thought Content: Thought content normal.         Judgment: Judgment normal.           Assessment/Plan    Diagnoses and all orders for this visit:    1. Pulmonary hypertension (CMS/HCC) (Primary)    2. Generalized osteoarthritis    3. Obesity, morbid, BMI 50 or higher (CMS/HCC)    4. Venous stasis dermatitis of both lower extremities    5. Lumbar radiculopathy    6. SALAS (obstructive sleep apnea)    7. Obstructive sleep apnea syndrome      Patient with multiple medical issues some of which are noted above.  Recently seen in emergency room with atrial fibrillation and was started on Xarelto.  Has been seen by cardiologist and is undergoing further evaluation.  Remains with massive edema of the lower legs and is scheduled for follow-up visit with lymphedema  clinic.    This note includes information entered using a voice recognition dictation system.  Though reviewed, some nonsensible errors may remain.        Answers for HPI/ROS submitted by the patient on 6/27/2021  What is the primary reason for your visit?: Back Pain  Chronicity: new  Onset: in the past 7 days  Frequency: constantly  Progression since onset: gradually improving  Pain location: sacro-iliac, thoracic spine  Pain quality: shooting  Radiates to: right thigh  Pain - numeric: 8/10  Pain is: the same all the time  Aggravated by: bending, position, lying down, standing, stress, twisting  bladder incontinence: No  bowel incontinence: No  leg pain: Yes  paresis: No  paresthesias: No  perianal numbness: No  tingling: No  weight loss: No  Risk factors: history of osteoporosis, lack of exercise, obesity

## 2021-06-29 ENCOUNTER — TELEPHONE (OUTPATIENT)
Dept: CARDIOLOGY | Facility: CLINIC | Age: 81
End: 2021-06-29

## 2021-06-29 NOTE — TELEPHONE ENCOUNTER
Pt is calling today and states she saw JA on 6/25/21 and she would like to know if she needs a prescription to continue on the xarelto? All she had was a starter pack. Please advise.     Plan:      Plan       1.  Persistent atrial fibrillation-we will place a Holter monitor.  Patient is on Xarelto, will continue that for now  2.  Obstructive sleep apnea, not on CPAP, no recent evaluation, we will refer to sleep medicine  3.  Dyspnea, new onset A. fib, we will obtain an echocardiogram  4.  Chronic lymphedema-she also has listed pulmonary hypertension as a diagnosis but states he sees Dr. Sy for this.  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  Please don't hesitate to call if I can be of assistance in any way.        Current Outpatient Medications:   •  amLODIPine-benazepril (LOTREL) 10-40 MG per capsule, TAKE ONE CAPSULE BY MOUTH EVERY DAY, Disp: 90 capsule, Rfl: 3  •  carisoprodol (SOMA) 350 MG tablet, Take 1 tablet by mouth 4 (Four) Times a Day As Needed for Muscle Spasms., Disp: 120 tablet, Rfl: 5  •  clonazePAM (KlonoPIN) 0.5 MG tablet, TAKE 1 TABLET BY MOUTH TWICE A DAY, Disp: 60 tablet, Rfl: 5  •  furosemide (LASIX) 40 MG tablet, Take 1 tablet by mouth Daily., Disp: 90 tablet, Rfl: 3  •  HYDROcodone-acetaminophen (NORCO) 5-325 MG per tablet, Take 1 tablet by mouth Every 4 (Four) Hours As Needed for Moderate Pain  or Severe Pain ., Disp: 120 tablet, Rfl: 0  •  ketorolac (ACULAR) 0.5 % ophthalmic solution, INSTILL 1 DROP INTO LEFT EYE 4 TIMES A DAY, Disp: , Rfl: 3  •  PROAIR  (90 BASE) MCG/ACT inhaler, INHALE 2 PUFFS BY MOUTH 4 TIMES DAILY AS NEEDED, Disp: 1 inhaler, Rfl: 5  •  Rivaroxaban (Xarelto Starter Pack) tablet therapy pack starter pack, Take one 15 mg  tablet twice daily with food for 21 days.  Followed by one 20 mg tablet by mouth once daily with food. Take as directed, Disp: 1 each, Rfl: 0  •  silver sulfadiazine (SILVADENE, SSD) 1 % cream, APPLY TOPICALLY TO THE APPROPRIATE AREA AS DIRECTED 2 (TWO) TIMES A DAY. *CM:I87.2, Disp: 85 g, Rfl: 2  •  vitamin B-12 (CYANOCOBALAMIN) 1000 MCG tablet, Take 2 tablets by mouth Daily., Disp: 100 tablet, Rfl: 11  •  Vitamin D, Cholecalciferol, 50 MCG (2000 UT) capsule, Take 1 tablet by mouth Daily., Disp: , Rfl:               No follow-ups on file.

## 2021-07-02 ENCOUNTER — TELEPHONE (OUTPATIENT)
Dept: FAMILY MEDICINE CLINIC | Facility: CLINIC | Age: 81
End: 2021-07-02

## 2021-07-02 DIAGNOSIS — M54.16 LUMBAR RADICULAR PAIN: Primary | ICD-10-CM

## 2021-07-02 RX ORDER — PREDNISONE 10 MG/1
TABLET ORAL
Qty: 24 TABLET | Refills: 0 | Status: SHIPPED | OUTPATIENT
Start: 2021-07-02 | End: 2021-09-24

## 2021-07-02 NOTE — TELEPHONE ENCOUNTER
Caller: Tamara Singh    Relationship: Self    Best call back number: [phone]*    What medication are you requesting: PREDNISONE    What are your current symptoms: LOW BACK PAIN RUNNING DOWN TO RIGHT LEGB    How long have you been experiencing symptoms: 2 DAYS    Have you had these symptoms before:    [x] Yes  [] No    Have you been treated for these symptoms before:   [x] Yes  [] No    If a prescription is needed, what is your preferred pharmacy and phone number:   [id number] [address] - [phone]  - [phone]   [phone]    Additional notes: PATIENT REQUESTING DR. CARIAS TO SEND PREDNISONE TO HER PHARMACY FOR LOW BACK PAIN THAT IS RUNNING DOWN HER RIGHT LEG.

## 2021-07-05 ENCOUNTER — HOSPITAL ENCOUNTER (OUTPATIENT)
Dept: PHYSICAL THERAPY | Facility: HOSPITAL | Age: 81
Setting detail: THERAPIES SERIES
Discharge: HOME | End: 2021-07-05

## 2021-07-05 DIAGNOSIS — I87.2 VENOUS INSUFFICIENCY: ICD-10-CM

## 2021-07-05 DIAGNOSIS — I89.0 LYMPHEDEMA: Primary | ICD-10-CM

## 2021-07-05 PROCEDURE — 97161 PT EVAL LOW COMPLEX 20 MIN: CPT

## 2021-07-16 ENCOUNTER — HOSPITAL ENCOUNTER (OUTPATIENT)
Dept: CARDIOLOGY | Facility: HOSPITAL | Age: 81
Discharge: HOME | End: 2021-07-16
Admitting: INTERNAL MEDICINE

## 2021-07-16 VITALS
BODY MASS INDEX: 39.56 KG/M2 | WEIGHT: 215 LBS | SYSTOLIC BLOOD PRESSURE: 132 MMHG | OXYGEN SATURATION: 96 % | HEIGHT: 62 IN | HEART RATE: 90 BPM | DIASTOLIC BLOOD PRESSURE: 75 MMHG

## 2021-07-16 DIAGNOSIS — E66.01 OBESITY, MORBID, BMI 50 OR HIGHER (HCC): ICD-10-CM

## 2021-07-16 DIAGNOSIS — G47.33 OBSTRUCTIVE SLEEP APNEA SYNDROME: ICD-10-CM

## 2021-07-16 DIAGNOSIS — I89.0 LYMPHEDEMA: ICD-10-CM

## 2021-07-16 DIAGNOSIS — I27.20 PULMONARY HYPERTENSION (HCC): ICD-10-CM

## 2021-07-16 DIAGNOSIS — I48.19 ATRIAL FIBRILLATION, PERSISTENT (HCC): ICD-10-CM

## 2021-07-16 LAB
AORTIC ARCH: 2.2 CM
AORTIC DIMENSIONLESS INDEX: 0.8 (DI)
ASCENDING AORTA: 3.1 CM
BH CV ECHO MEAS - ACS: 2 CM
BH CV ECHO MEAS - AO ARCH DIAM (PROXIMAL TRANS.): 2.2 CM
BH CV ECHO MEAS - AO MAX PG (FULL): 3.1 MMHG
BH CV ECHO MEAS - AO MAX PG: 10.5 MMHG
BH CV ECHO MEAS - AO MEAN PG (FULL): 2 MMHG
BH CV ECHO MEAS - AO MEAN PG: 6 MMHG
BH CV ECHO MEAS - AO ROOT AREA (BSA CORRECTED): 1.4
BH CV ECHO MEAS - AO ROOT AREA: 5.7 CM^2
BH CV ECHO MEAS - AO ROOT DIAM: 2.7 CM
BH CV ECHO MEAS - AO V2 MAX: 162 CM/SEC
BH CV ECHO MEAS - AO V2 MEAN: 112 CM/SEC
BH CV ECHO MEAS - AO V2 VTI: 30.1 CM
BH CV ECHO MEAS - ASC AORTA: 3.1 CM
BH CV ECHO MEAS - AVA(I,A): 2.4 CM^2
BH CV ECHO MEAS - AVA(I,D): 2.4 CM^2
BH CV ECHO MEAS - AVA(V,A): 2.6 CM^2
BH CV ECHO MEAS - AVA(V,D): 2.6 CM^2
BH CV ECHO MEAS - BSA(HAYCOCK): 2.1 M^2
BH CV ECHO MEAS - BSA: 2 M^2
BH CV ECHO MEAS - BZI_BMI: 39.3 KILOGRAMS/M^2
BH CV ECHO MEAS - BZI_METRIC_HEIGHT: 157.5 CM
BH CV ECHO MEAS - BZI_METRIC_WEIGHT: 97.5 KG
BH CV ECHO MEAS - EDV(CUBED): 85.2 ML
BH CV ECHO MEAS - EDV(MOD-SP2): 44 ML
BH CV ECHO MEAS - EDV(MOD-SP4): 55 ML
BH CV ECHO MEAS - EDV(TEICH): 87.7 ML
BH CV ECHO MEAS - EF(CUBED): 71.4 %
BH CV ECHO MEAS - EF(MOD-BP): 68.4 %
BH CV ECHO MEAS - EF(MOD-SP2): 70.5 %
BH CV ECHO MEAS - EF(MOD-SP4): 69.1 %
BH CV ECHO MEAS - EF(TEICH): 63.3 %
BH CV ECHO MEAS - ESV(CUBED): 24.4 ML
BH CV ECHO MEAS - ESV(MOD-SP2): 13 ML
BH CV ECHO MEAS - ESV(MOD-SP4): 17 ML
BH CV ECHO MEAS - ESV(TEICH): 32.2 ML
BH CV ECHO MEAS - FS: 34.1 %
BH CV ECHO MEAS - IVS/LVPW: 0.82
BH CV ECHO MEAS - IVSD: 0.9 CM
BH CV ECHO MEAS - LAT PEAK E' VEL: 14.4 CM/SEC
BH CV ECHO MEAS - LV DIASTOLIC VOL/BSA (35-75): 27.9 ML/M^2
BH CV ECHO MEAS - LV MASS(C)D: 147.8 GRAMS
BH CV ECHO MEAS - LV MASS(C)DI: 75 GRAMS/M^2
BH CV ECHO MEAS - LV MAX PG: 7.4 MMHG
BH CV ECHO MEAS - LV MEAN PG: 4 MMHG
BH CV ECHO MEAS - LV SYSTOLIC VOL/BSA (12-30): 8.6 ML/M^2
BH CV ECHO MEAS - LV V1 MAX: 136 CM/SEC
BH CV ECHO MEAS - LV V1 MEAN: 89.9 CM/SEC
BH CV ECHO MEAS - LV V1 VTI: 23.3 CM
BH CV ECHO MEAS - LVIDD: 4.4 CM
BH CV ECHO MEAS - LVIDS: 2.9 CM
BH CV ECHO MEAS - LVLD AP2: 7 CM
BH CV ECHO MEAS - LVLD AP4: 7.4 CM
BH CV ECHO MEAS - LVLS AP2: 5.4 CM
BH CV ECHO MEAS - LVLS AP4: 6 CM
BH CV ECHO MEAS - LVOT AREA (M): 3.1 CM^2
BH CV ECHO MEAS - LVOT AREA: 3.1 CM^2
BH CV ECHO MEAS - LVOT DIAM: 2 CM
BH CV ECHO MEAS - LVPWD: 1.1 CM
BH CV ECHO MEAS - MED PEAK E' VEL: 12.3 CM/SEC
BH CV ECHO MEAS - MV DEC SLOPE: 502 CM/SEC^2
BH CV ECHO MEAS - MV DEC TIME: 0.2 SEC
BH CV ECHO MEAS - MV E MAX VEL: 137 CM/SEC
BH CV ECHO MEAS - MV MAX PG: 6.4 MMHG
BH CV ECHO MEAS - MV MEAN PG: 2 MMHG
BH CV ECHO MEAS - MV P1/2T MAX VEL: 129 CM/SEC
BH CV ECHO MEAS - MV P1/2T: 75.3 MSEC
BH CV ECHO MEAS - MV V2 MAX: 126 CM/SEC
BH CV ECHO MEAS - MV V2 MEAN: 63.5 CM/SEC
BH CV ECHO MEAS - MV V2 VTI: 26.1 CM
BH CV ECHO MEAS - MVA P1/2T LCG: 1.7 CM^2
BH CV ECHO MEAS - MVA(P1/2T): 2.9 CM^2
BH CV ECHO MEAS - MVA(VTI): 2.8 CM^2
BH CV ECHO MEAS - PA ACC TIME: 0.06 SEC
BH CV ECHO MEAS - PA MAX PG (FULL): 5.4 MMHG
BH CV ECHO MEAS - PA MAX PG: 8.4 MMHG
BH CV ECHO MEAS - PA PR(ACCEL): 50.7 MMHG
BH CV ECHO MEAS - PA V2 MAX: 145 CM/SEC
BH CV ECHO MEAS - PVA(V,A): 2.1 CM^2
BH CV ECHO MEAS - PVA(V,D): 2.1 CM^2
BH CV ECHO MEAS - QP/QS: 0.69
BH CV ECHO MEAS - RAP SYSTOLE: 3 MMHG
BH CV ECHO MEAS - RV MAX PG: 3 MMHG
BH CV ECHO MEAS - RV MEAN PG: 1 MMHG
BH CV ECHO MEAS - RV V1 MAX: 86.2 CM/SEC
BH CV ECHO MEAS - RV V1 MEAN: 54 CM/SEC
BH CV ECHO MEAS - RV V1 VTI: 14.5 CM
BH CV ECHO MEAS - RVOT AREA: 3.5 CM^2
BH CV ECHO MEAS - RVOT DIAM: 2.1 CM
BH CV ECHO MEAS - RVSP: 44.7 MMHG
BH CV ECHO MEAS - SI(AO): 87.4 ML/M^2
BH CV ECHO MEAS - SI(CUBED): 30.8 ML/M^2
BH CV ECHO MEAS - SI(LVOT): 37.1 ML/M^2
BH CV ECHO MEAS - SI(MOD-SP2): 15.7 ML/M^2
BH CV ECHO MEAS - SI(MOD-SP4): 19.3 ML/M^2
BH CV ECHO MEAS - SI(TEICH): 28.1 ML/M^2
BH CV ECHO MEAS - SV(AO): 172.3 ML
BH CV ECHO MEAS - SV(CUBED): 60.8 ML
BH CV ECHO MEAS - SV(LVOT): 73.2 ML
BH CV ECHO MEAS - SV(MOD-SP2): 31 ML
BH CV ECHO MEAS - SV(MOD-SP4): 38 ML
BH CV ECHO MEAS - SV(RVOT): 50.2 ML
BH CV ECHO MEAS - SV(TEICH): 55.5 ML
BH CV ECHO MEAS - TAPSE (>1.6): 1.8 CM
BH CV ECHO MEAS - TR MAX VEL: 323 CM/SEC
BH CV ECHO MEASUREMENTS AVERAGE E/E' RATIO: 10.26
BH CV XLRA - RV BASE: 2.7 CM
BH CV XLRA - RV LENGTH: 6.4 CM
BH CV XLRA - RV MID: 2.1 CM
BH CV XLRA - TDI S': 13.5 CM/SEC
LEFT ATRIUM VOLUME INDEX: 27 ML/M2
LV EF 2D ECHO EST: 70 %
MAXIMAL PREDICTED HEART RATE: 140 BPM
SINUS: 2.8 CM
STJ: 2.6 CM
STRESS TARGET HR: 119 BPM

## 2021-07-16 PROCEDURE — 93306 TTE W/DOPPLER COMPLETE: CPT

## 2021-07-16 PROCEDURE — 93306 TTE W/DOPPLER COMPLETE: CPT | Performed by: INTERNAL MEDICINE

## 2021-07-21 ENCOUNTER — TELEPHONE (OUTPATIENT)
Dept: CARDIOLOGY | Facility: CLINIC | Age: 81
End: 2021-07-21

## 2021-07-21 NOTE — TELEPHONE ENCOUNTER
See both results. Called and left a VM. Will continue to try to reach pt.    Arlette Coffman RN  Triage [facility]

## 2021-07-21 NOTE — TELEPHONE ENCOUNTER
----- Message from Evens Miller III, MD sent at 7/21/2021 12:59 PM EDT -----  Please call-monitor confirms that she is persistently in atrial fibrillation as we discussed at her office visit.  Lets make an appointment for her to see me over the next week or 2 so we can make plans going forward

## 2021-07-21 NOTE — TELEPHONE ENCOUNTER
Schedulers can you please call and schedule this pt with an NP in 2 weeks to discuss monitor results  Thanks  Micki Yoon RN  Triage nurse

## 2021-07-21 NOTE — TELEPHONE ENCOUNTER
Patient notified of both results and verbalized understanding    Dr Miller, pt can only come in on fridays.  You didn't have an apt with in the time frame you gave.  Would you like her to be scheduled with NP?  Micki Yoon RN  Triage nurse

## 2021-08-11 ENCOUNTER — HOSPITAL ENCOUNTER (OUTPATIENT)
Dept: CT IMAGING | Facility: HOSPITAL | Age: 81
Discharge: HOME | End: 2021-08-11
Admitting: THORACIC SURGERY (CARDIOTHORACIC VASCULAR SURGERY)

## 2021-08-11 DIAGNOSIS — Q27.30 ARTERIOVENOUS MALFORMATION: ICD-10-CM

## 2021-08-11 LAB — CREAT BLDA-MCNC: 0.7 MG/DL (ref 0.6–1.3)

## 2021-08-11 PROCEDURE — 82565 ASSAY OF CREATININE: CPT

## 2021-08-11 PROCEDURE — 25010000002 IOPAMIDOL 61 % SOLUTION: Performed by: THORACIC SURGERY (CARDIOTHORACIC VASCULAR SURGERY)

## 2021-08-11 PROCEDURE — 71260 CT THORAX DX C+: CPT

## 2021-08-11 RX ADMIN — IOPAMIDOL 75 ML: 612 INJECTION, SOLUTION INTRAVENOUS at 11:26

## 2021-08-13 ENCOUNTER — APPOINTMENT (OUTPATIENT)
Dept: SLEEP MEDICINE | Facility: HOSPITAL | Age: 81
End: 2021-08-13

## 2021-08-13 ENCOUNTER — APPOINTMENT (OUTPATIENT)
Dept: GENERAL RADIOLOGY | Facility: HOSPITAL | Age: 81
End: 2021-08-13

## 2021-08-13 ENCOUNTER — HOSPITAL ENCOUNTER (EMERGENCY)
Facility: HOSPITAL | Age: 81
Discharge: HOME | End: 2021-08-13
Attending: EMERGENCY MEDICINE | Admitting: EMERGENCY MEDICINE

## 2021-08-13 ENCOUNTER — OFFICE VISIT (OUTPATIENT)
Dept: CARDIOLOGY | Facility: CLINIC | Age: 81
End: 2021-08-13

## 2021-08-13 VITALS — BODY MASS INDEX: 40.48 KG/M2 | HEART RATE: 72 BPM | WEIGHT: 220 LBS | HEIGHT: 62 IN

## 2021-08-13 VITALS
SYSTOLIC BLOOD PRESSURE: 121 MMHG | WEIGHT: 220 LBS | HEIGHT: 62 IN | BODY MASS INDEX: 40.48 KG/M2 | TEMPERATURE: 98.6 F | OXYGEN SATURATION: 98 % | HEART RATE: 74 BPM | DIASTOLIC BLOOD PRESSURE: 65 MMHG | RESPIRATION RATE: 16 BRPM

## 2021-08-13 DIAGNOSIS — I48.19 ATRIAL FIBRILLATION, PERSISTENT (HCC): Primary | ICD-10-CM

## 2021-08-13 DIAGNOSIS — I89.0 LYMPHEDEMA: ICD-10-CM

## 2021-08-13 DIAGNOSIS — G47.33 OBSTRUCTIVE SLEEP APNEA SYNDROME: ICD-10-CM

## 2021-08-13 DIAGNOSIS — M25.572 ACUTE LEFT ANKLE PAIN: Primary | ICD-10-CM

## 2021-08-13 DIAGNOSIS — I10 ESSENTIAL HYPERTENSION: ICD-10-CM

## 2021-08-13 DIAGNOSIS — I87.2 VENOUS STASIS DERMATITIS OF BOTH LOWER EXTREMITIES: ICD-10-CM

## 2021-08-13 DIAGNOSIS — I27.20 PULMONARY HYPERTENSION (HCC): ICD-10-CM

## 2021-08-13 DIAGNOSIS — Q27.30 ARTERIOVENOUS MALFORMATION: ICD-10-CM

## 2021-08-13 DIAGNOSIS — S39.012A LUMBAR STRAIN, INITIAL ENCOUNTER: ICD-10-CM
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
## 2021-08-13 PROCEDURE — 93000 ELECTROCARDIOGRAM COMPLETE: CPT | Performed by: NURSE PRACTITIONER

## 2021-08-13 PROCEDURE — 99214 OFFICE O/P EST MOD 30 MIN: CPT | Performed by: NURSE PRACTITIONER

## 2021-08-13 PROCEDURE — 73610 X-RAY EXAM OF ANKLE: CPT

## 2021-08-13 PROCEDURE — 72110 X-RAY EXAM L-2 SPINE 4/>VWS: CPT

## 2021-08-13 PROCEDURE — 99282 EMERGENCY DEPT VISIT SF MDM: CPT

## 2021-08-13 RX ORDER — ATENOLOL 25 MG/1
25 TABLET ORAL
Qty: 30 TABLET | Refills: 1 | Status: SHIPPED | OUTPATIENT
Start: 2021-08-13 | End: 2021-09-06
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
## 2021-08-13 NOTE — DISCHARGE INSTRUCTIONS
Take Tylenol over-the-counter per packaging directions as needed for pain    Please expect some soreness    Can use cold pack or ice pack to the affected areas of discomfort    Return Precautions    Although you are being discharged from the ED today, I encourage you to return for worsening symptoms.  Things can, and do, change such that treatment at home with medication may not be adequate.      Specifically, return for any of the following:    Chest pain, shortness of breath, pain or nausea and vomiting not controlled by medications provided.    Please make a follow up with your Primary Care Provider for a blood pressure recheck.

## 2021-08-13 NOTE — PROGRESS NOTES
Date of Office Visit: 2021  Encounter Provider: YISEL Burger  Place of Service: [facility] CARDIOLOGY  Patient Name: Tamara Singh  :1940  Primary Cardiologist: Dr. Evens Miller    Chief Complaint   Patient presents with   • Atrial Fibrillation     2 month f/u    :     HPI: Tamara Singh is a pleasant 81 y.o. female who presents today for she is a new patient to me and I have reviewed her medical records. She has been diagnosed with hypertension, obesity, lymphedema, and obstructive sleep apnea. She also has macular degeneration and her left eye is worse than her left.     In 2021, she presented to the [facility] ED for evaluation of back pain.  Incidentally she was found to be in atrial fibrillation which was new onset.  She was asymptomatic and rate controlled naturally.  She was referred to Dr. Evens Miller and seen in the office in 2021.  He ordered a Holter monitor, referral to [facility] sleep medicine, and echocardiogram.  He also noted that Dr. 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  He wanted to reassess her PA pressures on echocardiogram.    I reviewed her echocardiogram completed in 2021: LVEF 68-70%, diastolic function indeterminate, left atrial cavity mildly dilated, aortic valve sclerosis, mild calcification of the aortic valve, mild mitral annular calcification, mild mitral valve thickening, mild tricuspid valve regurgitation, and mild pulmonary hypertension with RVSP of 44.7 mmHg.  Dr. Miller felt the echocardiogram was stable.  24-hour Holter monitor showed atrial fibrillation with average heart rate of 59, range  bpm. Dr. Miller said she was in persistent atrial fibrillation and to continue with rivaroxaban.    Today is her follow-up visit.  She reports right before bedtime she experiences some palpitations.  She has chronic lower extremity edema which is unchanged.  She has been enrolled in the lymphedema clinic and will  start in September.  She denies chest pain, shortness of air, dizziness, syncope, or bleeding.  She has lost 75 pounds over the past 7 years.  She is caffeine free at this time.  She reports that she has macular degeneration and her vision is worse in her left eye than her right.    Past Medical History:   Diagnosis Date   • Abnormal ECG 6/05/21   • Arthritis    • Asthma     Used to use inhaler but dont anymore.   • Atrial fibrillation (CMS/HCC)    • Cancer (CMS/HCC)     Superficial on face   • Coronary artery disease    • Hypertension    • Macular degeneration    • Pulmonary hypertension (CMS/HCC)    • Sleep apnea        Past Surgical History:   Procedure Laterality Date   • CHOLECYSTECTOMY  1974       Social History     Socioeconomic History   • Marital status: Single     Spouse name: Not on file   • Number of children: Not on file   • Years of education: Not on file   • Highest education level: Not on file   Tobacco Use   • Smoking status: Never Smoker   • Smokeless tobacco: Never Used   Vaping Use   • Vaping Use: Never used   Substance and Sexual Activity   • Alcohol use: No   • Drug use: No     Types: Hydrocodone   • Sexual activity: Never       Family History   Problem Relation Age of Onset   • Heart attack Mother 86   • Stroke Mother    • Heart disease Mother 80   • Hypertension Mother    • Heart attack Brother 70   • Asthma Brother    • Hyperlipidemia Brother        The following portion of the patient's history were reviewed and updated as appropriate: past medical history, past surgical history, past social history, past family history, allergies, current medications, and problem list.    Review of Systems   Constitutional: Negative.   Eyes: Positive for vision loss in left eye and vision loss in right eye.   Cardiovascular: Positive for leg swelling and palpitations.   Hematologic/Lymphatic: Negative.    Musculoskeletal: Positive for joint pain and myalgias.   Neurological: Negative.        Allergies  "  Allergen Reactions   • Moxifloxacin    • Penicillins          Current Outpatient Medications:   •  amLODIPine-benazepril (LOTREL) 10-40 MG per capsule, TAKE ONE CAPSULE BY MOUTH EVERY DAY, Disp: 90 capsule, Rfl: 3  •  carisoprodol (SOMA) 350 MG tablet, Take 1 tablet by mouth 4 (Four) Times a Day As Needed for Muscle Spasms., Disp: 120 tablet, Rfl: 5  •  clonazePAM (KlonoPIN) 0.5 MG tablet, TAKE 1 TABLET BY MOUTH TWICE A DAY, Disp: 60 tablet, Rfl: 5  •  furosemide (LASIX) 40 MG tablet, Take 1 tablet by mouth Daily., Disp: 90 tablet, Rfl: 3  •  HYDROcodone-acetaminophen (NORCO) 5-325 MG per tablet, Take 1 tablet by mouth Every 4 (Four) Hours As Needed for Moderate Pain  or Severe Pain ., Disp: 120 tablet, Rfl: 0  •  ketorolac (ACULAR) 0.5 % ophthalmic solution, INSTILL 1 DROP INTO LEFT EYE 4 TIMES A DAY, Disp: , Rfl: 3  •  multivitamins-minerals (PRESERVISION AREDS 2) capsule capsule, , Disp: , Rfl:   •  PROAIR  (90 BASE) MCG/ACT inhaler, INHALE 2 PUFFS BY MOUTH 4 TIMES DAILY AS NEEDED, Disp: 1 inhaler, Rfl: 5  •  rivaroxaban (Xarelto) 20 MG tablet, Take 1 tablet by mouth Daily., Disp: 90 tablet, Rfl: 3  •  silver sulfadiazine (SILVADENE, SSD) 1 % cream, APPLY TOPICALLY TO THE APPROPRIATE AREA AS DIRECTED 2 (TWO) TIMES A DAY. *CM:I87.2, Disp: 85 g, Rfl: 2  •  vitamin B-12 (CYANOCOBALAMIN) 1000 MCG tablet, Take 2 tablets by mouth Daily., Disp: 100 tablet, Rfl: 11  •  Vitamin D, Cholecalciferol, 50 MCG (2000 UT) capsule, Take 1 tablet by mouth Daily., Disp: , Rfl:   •  atenolol (TENORMIN) 25 MG tablet, Take 1 tablet by mouth every night at bedtime., Disp: 30 tablet, Rfl: 1  •  predniSONE (DELTASONE) 10 MG tablet, 4 stat, then 2 bid x3 days, then 2 qam x4 days, Disp: 24 tablet, Rfl: 0        Objective:     Vitals:    08/13/21 1130   Pulse: 72   Weight: 99.8 kg (220 lb)   Height: 157.5 cm (62.01\")     MA forgot to check BP    PHYSICAL EXAM:    Vitals Reviewed.   General Appearance: No acute distress, well " developed and well nourished. Obese.   Eyes: Conjunctiva and lids: No erythema, swelling, or discharge. Sclera non-icteric.  Wears glasses.  HENT: Atraumatic, normocephalic. External eyes, ears, and nose normal. No hearing loss noted. Mucous membranes normal. Lips not cyanotic. Neck supple with no tenderness.  Respiratory: No signs of respiratory distress. Respiration rhythm and depth normal.   Clear to auscultation. No rales, crackles, rhonchi, or wheezing auscultated.   Cardiovascular:  Jugular Venous Pressure: Normal  Heart Rate and Rhythm: Irregularly, irregular.  Heart Sounds: Normal S1 and S2. No S3 or S4 noted.  Murmurs: No murmurs noted. No rubs, thrills, or gallops.   Arterial Pulses: Carotid pulses normal. No carotid bruit noted. Posterior tibialis and dorsalis pedis pulses normal.   Lower Extremities: Bilateral lymphedema noted especially around the ankles.  Red in color.    Gastrointestinal:  Abdomen soft, non-distended, non-tender. Normal bowel sounds.    Musculoskeletal: Normal movement of extremities  Skin and Nails: General appearance normal. No pallor, cyanosis, diaphoresis. Skin temperature normal. No clubbing of fingernails.   Psychiatric: Patient alert and oriented to person, place, and time. Speech and behavior appropriate. Normal mood and affect.       ECG 12 Lead    Date/Time: 8/13/2021 11:42 AM  Performed by: Lori Rojas APRN  Authorized by: Lori Rojas APRN   Comparison: compared with previous ECG from 6/25/2021  Similar to previous ECG  Rhythm: atrial fibrillation  Rate: normal  BPM: 72  Conduction: conduction normal  ST Segments: ST segments normal  T Waves: T waves normal  QRS axis: normal  Other: no other findings    Clinical impression: abnormal EKG              Assessment:       Diagnosis Plan   1. Atrial fibrillation, persistent (CMS/HCC)     2. Pulmonary hypertension (CMS/HCC)     3. Essential hypertension     4. Lymphedema     5. Venous stasis dermatitis of both lower  extremities     6. Obstructive sleep apnea syndrome     7. Arteriovenous malformation            Plan:       1.  Persistent Atrial Fibrillation: Mostly rate controlled on recent Holter monitor.  Heart rate 72 today.  She is experiencing palpitations at nighttime and I recommended taking atenolol 25 mg 1 tablet 1 hour before bedtime.    2.  Pulmonary Hypertension: Mild per echocardiogram.    3.  Hypertension: Blood pressure well controlled on current medication regimen.  I recommend increasing her furosemide to 80 mg in the morning and I will follow-up with her in 1 week.  If we repeat a BMP at that time, she will also need a TSH.  Her amlodipine could also be contributing to worsening lower extremity edema.  I will defer her antihypertensive regimen to her PCP.    4/5.  Lymphedema and Venous Stasis: She has been enrolled in the lymphedema clinic and will start treatments in September.    6.  Obstructive Sleep Apnea: Noncompliant with CPAP machine.  She said her brother used to say that she snored really badly and she does not snore anymore.  She was referred to [facility] by Dr. Miller.    7.  AV malformation: Follows with Dr. Sy.    As always, it has been a pleasure to participate in your patient's care. Thank you.       Sincerely,       YISEL Vergara  [facility] Cardiology      · COVID-19 Precautions - Patient was compliant in wearing a mask. When I saw the patient, I used appropriate personal protective equipment (PPE) including mask and eye shield (standard procedure).  Additionally, I used gown and gloves if indicated.  Hand hygiene was completed before and after seeing the patient.  · Dictated utilizing Dragon Dictation  · I spent 30 minutes reviewing her medical records/testing/previous office notes/labs, face-to-face interaction with patient, physical examination, formulating the plan of care, and discussion of plan of care with patient.
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
## 2021-08-16 ENCOUNTER — PATIENT OUTREACH (OUTPATIENT)
Dept: CASE MANAGEMENT | Facility: OTHER | Age: 81
End: 2021-08-16
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
## 2021-08-17 DIAGNOSIS — I10 ESSENTIAL HYPERTENSION: Primary | ICD-10-CM

## 2021-08-18 RX ORDER — AMLODIPINE BESYLATE AND BENAZEPRIL HYDROCHLORIDE 10; 40 MG/1; MG/1
CAPSULE ORAL
Qty: 90 CAPSULE | Refills: 3 | Status: SHIPPED | OUTPATIENT
Start: 2021-08-18 | End: 2022-06-17 | Stop reason: SDUPTHER

## 2021-08-18 NOTE — TELEPHONE ENCOUNTER
Rx Refill Note  Requested Prescriptions     Pending Prescriptions Disp Refills   • amLODIPine-benazepril (LOTREL) 10-40 MG per capsule [Pharmacy Med Name: AMLODIPINE-BENAZEPRIL 10-40 MG] 90 capsule 3     Sig: TAKE ONE CAPSULE BY MOUTH EVERY DAY      Last office visit with prescribing clinician: 6/28/2021      Next office visit with prescribing clinician: 9/27/2021            Gemini Majano MA  08/18/21, 07:48 EDT

## 2021-08-20 ENCOUNTER — TELEPHONE (OUTPATIENT)
Dept: CARDIOLOGY | Facility: CLINIC | Age: 81
End: 2021-08-20

## 2021-08-20 DIAGNOSIS — R60.0 LOWER EXTREMITY EDEMA: ICD-10-CM

## 2021-08-20 DIAGNOSIS — I10 ESSENTIAL HYPERTENSION: Primary | ICD-10-CM

## 2021-08-20 NOTE — TELEPHONE ENCOUNTER
Please call patient.     1) Please call her in 1 week for reassessment of palpitations with starting atenolol.   2) How are her home BP and heart rates?  3) Did her edema improve with increasing furosemide to twice a day?    Thanks.
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
## 2021-08-24 NOTE — TELEPHONE ENCOUNTER
Recommendations reviewed and the patient verbalized understanding.    Noemi,  She wasn't prepared to make an appointment when we talked. Will you please get her scheduled in 4 weeks with Lori?    Thank you,  Eleni Pace RN  [facility]  Triage

## 2021-09-01 ENCOUNTER — OFFICE VISIT (OUTPATIENT)
Dept: SURGERY | Facility: CLINIC | Age: 81
End: 2021-09-01

## 2021-09-01 VITALS
SYSTOLIC BLOOD PRESSURE: 148 MMHG | BODY MASS INDEX: 39.38 KG/M2 | RESPIRATION RATE: 16 BRPM | OXYGEN SATURATION: 93 % | DIASTOLIC BLOOD PRESSURE: 72 MMHG | HEART RATE: 76 BPM | WEIGHT: 214 LBS | HEIGHT: 62 IN | TEMPERATURE: 96.3 F

## 2021-09-01 DIAGNOSIS — Q27.30 ARTERIOVENOUS MALFORMATION: Primary | ICD-10-CM

## 2021-09-01 PROCEDURE — 99213 OFFICE O/P EST LOW 20 MIN: CPT | Performed by: NURSE PRACTITIONER

## 2021-09-01 NOTE — PROGRESS NOTES
"Chief Complaint  Pulmonary arteriovenous malformation, follow-up with CT chest    Subjective          Tamara Singh presents to [facility] THORACIC SURGERY who presents today with a CT of the chest for continued follow-up and surveillance of a pulmonary arteriovenous malformation.    History of Present Illness    Ms. Singh is a pleasant 81-year-old female who presents with continued surveillance of an arteriovenous malformation which was first identified on a CT of the chest performed in 2014.  We have continued to follow her with surveillance.  The patient has had no fever, chills, cough or hemoptysis.  She has been working harder and eating healthier and has lost some weight since she was last seen in our office.  She was recently diagnosed with atrial fibrillation and was initiated on atenolol and Xarelto.  Unfortunately, she discontinued her atenolol on her own due to persistent fatigue and slow heart rate.  She also has chronic lower extremity swelling and has been referred to lymphedema clinic.    Objective   Vital Signs:   /72 (BP Location: Right arm, Patient Position: Sitting, Cuff Size: Adult)   Pulse 76   Temp 96.3 °F (35.7 °C) (Temporal)   Resp 16   Ht 157.5 cm (62\")   Wt 97.1 kg (214 lb)   SpO2 93%   BMI 39.14 kg/m²     Physical Exam  Vitals and nursing note reviewed.   Constitutional:       Appearance: Normal appearance. She is obese.   HENT:      Head: Normocephalic and atraumatic.   Eyes:      Conjunctiva/sclera: Conjunctivae normal.   Cardiovascular:      Rate and Rhythm: Normal rate. Rhythm irregular.      Pulses: Normal pulses.   Pulmonary:      Effort: Pulmonary effort is normal.      Breath sounds: Normal breath sounds. No wheezing, rhonchi or rales.   Chest:      Chest wall: No tenderness.   Abdominal:      General: Bowel sounds are normal.      Palpations: Abdomen is soft.   Musculoskeletal:      Cervical back: Neck supple.      Right lower leg: Edema present.      " Left lower leg: Edema present.   Skin:     General: Skin is warm and dry.   Neurological:      General: No focal deficit present.      Mental Status: She is alert and oriented to person, place, and time. Mental status is at baseline.   Psychiatric:         Mood and Affect: Mood normal.         Thought Content: Thought content normal.         Judgment: Judgment normal.        Result Review :   The following data was reviewed by: YISEL Toussaint on 09/01/2021:    Data reviewed: Radiologic studies ET of the chest performed with IV contrast on 8/11/2021 was independently reviewed.  The 3.1 cm arteriovenous malformation the left lower lobe is stable.  There is some stable scarring/atelectasis elsewhere in the lungs.  No lymphadenopathy in the chest.  No pleural effusion.         Assessment and Plan    Diagnoses and all orders for this visit:    1. Arteriovenous malformation (Primary)  -     CT Chest With Contrast; Future    CT of the chest is stable with no enlargement of the known 3.1 cm arteriovenous malformation in the left lower lobe.  Patient is completely asymptomatic.  I do recommend we continue to follow with another CT of the chest with contrast in 1 year.  Of course, we are happy to see her in the interim, should the need arise.  Thank you for allowing us to participate in the care of Tamara Singh.  We will continue to keep you informed of her progress.    I spent 21 minutes caring for Tamara on this date of service. This time includes time spent by me in the following activities:preparing for the visit, reviewing tests, obtaining and/or reviewing a separately obtained history, performing a medically appropriate examination and/or evaluation , counseling and educating the patient/family/caregiver, ordering medications, tests, or procedures, referring and communicating with other health care professionals , documenting information in the medical record and independently interpreting results and communicating  that information with the patient/family/caregiver  Follow Up   Return in about 1 year (around 9/1/2022) for Next scheduled follow up with a CT chest with contrast.  Patient was given instructions and counseling regarding her condition or for health maintenance advice. Please see specific information pulled into the AVS if appropriate.

## 2021-09-06 RX ORDER — ATENOLOL 25 MG/1
TABLET ORAL
Qty: 30 TABLET | Refills: 1 | Status: SHIPPED | OUTPATIENT
Start: 2021-09-06 | End: 2021-09-24 | Stop reason: SINTOL

## 2021-09-07 DIAGNOSIS — I87.2 VENOUS STASIS ULCER OF LEFT CALF LIMITED TO BREAKDOWN OF SKIN WITHOUT VARICOSE VEINS (HCC): ICD-10-CM

## 2021-09-07 DIAGNOSIS — L97.221 VENOUS STASIS ULCER OF LEFT CALF LIMITED TO BREAKDOWN OF SKIN WITHOUT VARICOSE VEINS (HCC): ICD-10-CM

## 2021-09-07 NOTE — TELEPHONE ENCOUNTER
Rx Refill Note  Requested Prescriptions     Pending Prescriptions Disp Refills   • silver sulfadiazine (SILVADENE, SSD) 1 % cream [Pharmacy Med Name: SILVER SULFADIAZINE 1% CREAM] 85 g 2     Sig: APPLY TOPICALLY TO THE APPROPRIATE AREA AS DIRECTED 2 (TWO) TIMES A DAY. *CM:I87.2      Last office visit with prescribing clinician: 6/28/2021      Next office visit with prescribing clinician: 9/27/2021            Gemini Majano MA  09/07/21, 07:36 EDT

## 2021-09-13 DIAGNOSIS — G89.29 CHRONIC LEFT-SIDED LOW BACK PAIN WITH LEFT-SIDED SCIATICA: ICD-10-CM

## 2021-09-13 DIAGNOSIS — M54.42 CHRONIC LEFT-SIDED LOW BACK PAIN WITH LEFT-SIDED SCIATICA: ICD-10-CM

## 2021-09-13 RX ORDER — HYDROCODONE BITARTRATE AND ACETAMINOPHEN 5; 325 MG/1; MG/1
1 TABLET ORAL EVERY 4 HOURS PRN
Qty: 120 TABLET | Refills: 0 | Status: SHIPPED | OUTPATIENT
Start: 2021-09-13 | End: 2021-11-24 | Stop reason: SDUPTHER

## 2021-09-13 NOTE — TELEPHONE ENCOUNTER
Caller: Tamara Singh    Relationship: Self    Best call back number: [phone]    Medication needed:   Requested Prescriptions     Pending Prescriptions Disp Refills   • HYDROcodone-acetaminophen (NORCO) 5-325 MG per tablet 120 tablet 0     Sig: Take 1 tablet by mouth Every 4 (Four) Hours As Needed for Moderate Pain  or Severe Pain .       When do you need the refill by: ASAP     What additional details did the patient provide when requesting the medication: PATIENT HAS 2 DAYS LEFT OF MEDICATION.     Does the patient have less than a 3 day supply:  [x] Yes  [] No    What is the patient's preferred pharmacy: [facility] #[id number] [address] [phone] [facility] [phone] [facility]

## 2021-09-13 NOTE — TELEPHONE ENCOUNTER
Rx Refill Note  Requested Prescriptions     Pending Prescriptions Disp Refills   • HYDROcodone-acetaminophen (NORCO) 5-325 MG per tablet 120 tablet 0     Sig: Take 1 tablet by mouth Every 4 (Four) Hours As Needed for Moderate Pain  or Severe Pain .      Last office visit with prescribing clinician: 6/28/2021      Next office visit with prescribing clinician: 9/27/2021            Gemini Majano MA  09/13/21, 14:56 EDT

## 2021-09-15 ENCOUNTER — LAB (OUTPATIENT)
Dept: LAB | Facility: HOSPITAL | Age: 81
End: 2021-09-15

## 2021-09-15 DIAGNOSIS — I10 ESSENTIAL HYPERTENSION: ICD-10-CM

## 2021-09-15 DIAGNOSIS — R60.0 LOWER EXTREMITY EDEMA: ICD-10-CM

## 2021-09-15 LAB
ANION GAP SERPL CALCULATED.3IONS-SCNC: 13.6 MMOL/L (ref 5–15)
BUN SERPL-MCNC: 26 MG/DL (ref 8–23)
BUN/CREAT SERPL: 31.7 (ref 7–25)
CALCIUM SPEC-SCNC: 10 MG/DL (ref 8.6–10.5)
CHLORIDE SERPL-SCNC: 102 MMOL/L (ref 98–107)
CO2 SERPL-SCNC: 25.4 MMOL/L (ref 22–29)
CREAT SERPL-MCNC: 0.82 MG/DL (ref 0.57–1)
GFR SERPL CREATININE-BSD FRML MDRD: 67 ML/MIN/1.73
GLUCOSE SERPL-MCNC: 85 MG/DL (ref 65–99)
POTASSIUM SERPL-SCNC: 4.5 MMOL/L (ref 3.5–5.2)
SODIUM SERPL-SCNC: 141 MMOL/L (ref 136–145)
TSH SERPL DL<=0.05 MIU/L-ACNC: 4.96 UIU/ML (ref 0.27–4.2)

## 2021-09-15 PROCEDURE — 36415 COLL VENOUS BLD VENIPUNCTURE: CPT

## 2021-09-15 PROCEDURE — 80048 BASIC METABOLIC PNL TOTAL CA: CPT

## 2021-09-15 PROCEDURE — 84443 ASSAY THYROID STIM HORMONE: CPT

## 2021-09-20 ENCOUNTER — TRANSCRIBE ORDERS (OUTPATIENT)
Dept: ADMINISTRATIVE | Facility: HOSPITAL | Age: 81
End: 2021-09-20
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
## 2021-09-21 ENCOUNTER — TRANSCRIBE ORDERS (OUTPATIENT)
Dept: ADMINISTRATIVE | Facility: HOSPITAL | Age: 81
End: 2021-09-21
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
## 2021-09-23 ENCOUNTER — PATIENT OUTREACH (OUTPATIENT)
Dept: CASE MANAGEMENT | Facility: OTHER | Age: 81
End: 2021-09-23

## 2021-09-23 ENCOUNTER — PATIENT MESSAGE (OUTPATIENT)
Dept: CASE MANAGEMENT | Facility: OTHER | Age: 81
End: 2021-09-23

## 2021-09-23 NOTE — OUTREACH NOTE
Patient Outreach    Ambulatory Case Management Note    Call placed to pt who states she is doing well. She does have a follow up appt with cardiology tomorrow and feels like she is much better. She states she is now taking her BP every day and that has been under better control. He edema is down. Pt eager to get off the phone denies any questions or concerns but agreeable to continued calls. [facility] questionnaire sent to pt.         Meagan Wise RN  Ambulatory Case Management    9/23/2021, 11:22 EDT

## 2021-09-24 ENCOUNTER — OFFICE VISIT (OUTPATIENT)
Dept: CARDIOLOGY | Facility: CLINIC | Age: 81
End: 2021-09-24

## 2021-09-24 VITALS
HEART RATE: 73 BPM | DIASTOLIC BLOOD PRESSURE: 76 MMHG | BODY MASS INDEX: 38.64 KG/M2 | SYSTOLIC BLOOD PRESSURE: 140 MMHG | WEIGHT: 210 LBS | HEIGHT: 62 IN

## 2021-09-24 DIAGNOSIS — I10 ESSENTIAL HYPERTENSION: Primary | ICD-10-CM

## 2021-09-24 DIAGNOSIS — I48.11 LONGSTANDING PERSISTENT ATRIAL FIBRILLATION (HCC): ICD-10-CM

## 2021-09-24 PROCEDURE — 99214 OFFICE O/P EST MOD 30 MIN: CPT | Performed by: INTERNAL MEDICINE

## 2021-09-24 NOTE — PROGRESS NOTES
"    Subjective:     Encounter Date:09/24/21      Patient ID: Tamara Singh is a 81 y.o. female.    Chief Complaint:  History of Present Illness    Dear Dr. Sauceda,    I had the pleasure of seeing this patient in the office today for persistent atrial fibrillation.    Patient was recently in the emergency room.  She came in for back pain.  While there she was found to be in atrial fibrillation which was a new finding.    Holter monitor showed no significant tachycardia but persistent atrial fibrillation.  She was seen about 4 weeks ago and complained of feeling of palpitations at night.  She is seen by Demarco MARSHALL.  He was started on low-dose beta-blockade, but had heart rates that were too low so it was discontinued.  Generally she feels fine now.  Rare palpitations.  No shortness of breath at rest.  She has not yet been seen by sleep medicine.  We have set her up for that so we could see if she had untreated sleep apnea before we considered whether to proceed with a cardioversion    She also has a history of sleep apnea but is not currently using CPAP.  She thinks is probably been 3 years since she was on CPAP.  She had lost some weight and so she thought she may not need it.      This patient has no other known cardiac history.  This patient has no history of coronary artery disease, congestive heart failure, rheumatic fever, rheumatic heart disease, congenital heart disease or heart murmur.  This patient has never required invasive cardiovascular evaluation.        The following portions of the patient's history were reviewed and updated as appropriate: allergies, current medications, past family history, past medical history, past social history, past surgical history and problem list.    Procedures       Objective:     Vitals:    09/24/21 1317   BP: 140/76   Pulse: 73   Weight: 95.3 kg (210 lb)   Height: 157.5 cm (62\")     Body mass index is 38.41 kg/m².      Vitals reviewed.   Constitutional:       " General: Not in acute distress.     Appearance: Well-developed. Not diaphoretic.   Eyes:      General:         Right eye: No discharge.         Left eye: No discharge.      Conjunctiva/sclera: Conjunctivae normal.      Pupils: Pupils are equal, round, and reactive to light.   HENT:      Head: Normocephalic and atraumatic.      Nose: Nose normal.   Neck:      Thyroid: No thyromegaly.      Trachea: No tracheal deviation.      Lymphadenopathy: No cervical adenopathy.   Pulmonary:      Effort: Pulmonary effort is normal. No respiratory distress.      Breath sounds: Normal breath sounds. No stridor.   Chest:      Chest wall: Not tender to palpatation.   Cardiovascular:      Normal rate. Irregular rhythm.      Murmurs: There is no murmur.      . No S3 gallop. No click. No rub.   Pulses:     Intact distal pulses.   Edema:     Peripheral edema absent.   Abdominal:      General: Bowel sounds are normal. There is no distension.      Palpations: Abdomen is soft. There is no abdominal mass.      Tenderness: There is no abdominal tenderness. There is no guarding or rebound.   Musculoskeletal: Normal range of motion.         General: No tenderness or deformity.      Cervical back: Normal range of motion and neck supple. Skin:     General: Skin is warm and dry.      Findings: No erythema or rash.   Neurological:      Mental Status: Alert and oriented to person, place, and time.      Deep Tendon Reflexes: Reflexes are normal and symmetric.   Psychiatric:         Thought Content: Thought content normal.         Data and records reviewed:     Lab Results   Component Value Date    GLUCOSE 85 09/15/2021    BUN 26 (H) 09/15/2021    CREATININE 0.82 09/15/2021    EGFRIFNONA 67 09/15/2021    EGFRIFAFRI 96 01/25/2021    BCR 31.7 (H) 09/15/2021    K 4.5 09/15/2021    CO2 25.4 09/15/2021    CALCIUM 10.0 09/15/2021    PROTENTOTREF 7.8 01/25/2021    ALBUMIN 4.00 06/06/2021    LABIL2 1.5 01/25/2021    AST 20 06/06/2021    ALT 13 06/06/2021      No results found for: CHOL  No results found for: TRIG  No results found for: HDL  No results found for: LDL  No results found for: VLDL  No results found for: LDLHDL  CBC    CBC 1/25/21 6/6/21   WBC 5.77 6.49   RBC 5.05 4.57   Hemoglobin 15.5 14.4   Hematocrit 45.5 43.0   MCV 90.1 94.1   MCH 30.7 31.5   MCHC 34.1 33.5   RDW 13.8 14.1   Platelets 267 252           XR Ankle 3+ View Left    Result Date: 8/13/2021  There is no evidence for an acute bony abnormality of the left ankle.  This report was finalized on 8/13/2021 9:16 PM by Dr. Clarence Rene M.D.      CT Chest With Contrast Diagnostic    Result Date: 8/12/2021  Stable left lower lobe arteriovenous malformation    Radiation dose reduction techniques were utilized, including automated exposure control and exposure modulation based on body size.  This report was finalized on 8/12/2021 10:55 AM by Dr. Hamzah Morel M.D.      XR Spine Lumbar Complete 4+VW    Result Date: 8/13/2021   Degenerative and chronic changes of the spine. No acute fracture identified. Follow-up/further evaluation can be obtained as indicated.  This report was finalized on 8/13/2021 2:52 PM by Dr. Tereso Amos M.D.      Results for orders placed during the hospital encounter of 07/16/21    Adult Transthoracic Echo Complete W/ Cont if Necessary Per Protocol    Interpretation Summary  · Calculated right ventricular systolic pressure from tricuspid regurgitation is 44.7 mmHg.  · Estimated left ventricular EF = 70% Left ventricular systolic function is normal.  · Left ventricular diastolic function was indeterminate.  · Mild pulmonary hypertension is present.  · There is mild, bileaflet mitral valve thickening present.          Assessment:          Diagnosis Plan   1. Essential hypertension     2. Longstanding persistent atrial fibrillation (HCC)            Plan:       1.  Persistent atrial fibrillation-cont Xarelto, rate appropriate with no rate control, consistent with sick sinus  syndrome  2.  Obstructive sleep apnea, not on CPAP, no recent evaluation, we referred to sleep medicine, not yet been in to be seen  3.  Chronic lymphedema-  4.  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 Sy      Thank you very much for allowing us to participate in the care of this pleasant patient.  Please don't hesitate to call if I can be of assistance in any way.      Current Outpatient Medications:   •  amLODIPine-benazepril (LOTREL) 10-40 MG per capsule, TAKE ONE CAPSULE BY MOUTH EVERY DAY, Disp: 90 capsule, Rfl: 3  •  carisoprodol (SOMA) 350 MG tablet, Take 1 tablet by mouth 4 (Four) Times a Day As Needed for Muscle Spasms., Disp: 120 tablet, Rfl: 5  •  clonazePAM (KlonoPIN) 0.5 MG tablet, TAKE 1 TABLET BY MOUTH TWICE A DAY (Patient taking differently: Take 0.5 mg by mouth At Night As Needed.), Disp: 60 tablet, Rfl: 5  •  furosemide (LASIX) 40 MG tablet, Take 1 tablet by mouth Daily., Disp: 90 tablet, Rfl: 3  •  HYDROcodone-acetaminophen (NORCO) 5-325 MG per tablet, Take 1 tablet by mouth Every 4 (Four) Hours As Needed for Moderate Pain  or Severe Pain ., Disp: 120 tablet, Rfl: 0  •  ketorolac (ACULAR) 0.5 % ophthalmic solution, INSTILL 1 DROP INTO LEFT EYE 4 TIMES A DAY, Disp: , Rfl: 3  •  multivitamins-minerals (PRESERVISION AREDS 2) capsule capsule, Take 1 capsule by mouth 2 (Two) Times a Day., Disp: , Rfl:   •  PROAIR  (90 BASE) MCG/ACT inhaler, INHALE 2 PUFFS BY MOUTH 4 TIMES DAILY AS NEEDED, Disp: 1 inhaler, Rfl: 5  •  rivaroxaban (Xarelto) 20 MG tablet, Take 1 tablet by mouth Daily., Disp: 90 tablet, Rfl: 3  •  silver sulfadiazine (SILVADENE, SSD) 1 % cream, APPLY TOPICALLY TO THE APPROPRIATE AREA AS DIRECTED 2 (TWO) TIMES A DAY. *CM:I87.2, Disp: 85 g, Rfl: 2  •  vitamin B-12 (CYANOCOBALAMIN) 1000 MCG tablet, Take 2 tablets by mouth Daily., Disp: 100 tablet, Rfl: 11  •  Vitamin D, Cholecalciferol, 50 MCG (2000 UT) capsule, Take 1 tablet by mouth Daily., Disp: , Rfl:          No follow-ups on file.

## 2021-09-29 RX ORDER — ATENOLOL 25 MG/1
TABLET ORAL
Qty: 30 TABLET | Refills: 1 | OUTPATIENT
Start: 2021-09-29

## 2021-10-22 ENCOUNTER — APPOINTMENT (OUTPATIENT)
Dept: MRI IMAGING | Facility: HOSPITAL | Age: 81
End: 2021-10-22

## 2021-10-22 ENCOUNTER — TELEPHONE (OUTPATIENT)
Dept: CARDIOLOGY | Facility: CLINIC | Age: 81
End: 2021-10-22

## 2021-10-22 NOTE — TELEPHONE ENCOUNTER
Pt LVM inquiring about a prescription for a sleeping pill. Spoke with pt and advised her to call her PCP. Pt expressed understanding.    DA

## 2021-10-26 ENCOUNTER — PATIENT OUTREACH (OUTPATIENT)
Dept: CASE MANAGEMENT | Facility: OTHER | Age: 81
End: 2021-10-26

## 2021-10-26 NOTE — OUTREACH NOTE
Patient Outreach    Ambulatory Case Management Note    Call placed to pt who states she is doing well. She has had an appt with Dr Miller and no changes made to her treatment plan. Discussed need for flu vaccine. She states she will schedule an  appt with PCP and complete there. Pt informed she is overdue for her AWV and offered to schedule for her. Pt declined due to very busy at this time. Suggested she call PCP office when her schedule is better and get her AWV and flu shot at that time. Denies any questions or concerns.         Meagan Wise RN  Ambulatory Case Management    10/26/2021, 11:39 EDT

## 2021-10-31 ENCOUNTER — HOSPITAL ENCOUNTER (OUTPATIENT)
Dept: MRI IMAGING | Facility: HOSPITAL | Age: 81
Discharge: HOME | End: 2021-10-31
Admitting: EMERGENCY MEDICINE
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
## 2021-10-31 PROCEDURE — 72148 MRI LUMBAR SPINE W/O DYE: CPT

## 2021-11-12 ENCOUNTER — APPOINTMENT (OUTPATIENT)
Dept: MRI IMAGING | Facility: HOSPITAL | Age: 81
End: 2021-11-12

## 2021-11-24 ENCOUNTER — OFFICE VISIT (OUTPATIENT)
Dept: FAMILY MEDICINE CLINIC | Facility: CLINIC | Age: 81
End: 2021-11-24

## 2021-11-24 VITALS
HEART RATE: 60 BPM | RESPIRATION RATE: 20 BRPM | TEMPERATURE: 97 F | OXYGEN SATURATION: 98 % | BODY MASS INDEX: 38.16 KG/M2 | WEIGHT: 207.4 LBS | DIASTOLIC BLOOD PRESSURE: 72 MMHG | HEIGHT: 62 IN | SYSTOLIC BLOOD PRESSURE: 132 MMHG

## 2021-11-24 DIAGNOSIS — Z23 NEED FOR INFLUENZA VACCINATION: Primary | ICD-10-CM

## 2021-11-24 DIAGNOSIS — M15.9 GENERALIZED OSTEOARTHRITIS: ICD-10-CM

## 2021-11-24 DIAGNOSIS — Z79.899 HIGH RISK MEDICATION USE: ICD-10-CM

## 2021-11-24 DIAGNOSIS — M54.6 PAIN IN THORACIC SPINE: Primary | ICD-10-CM

## 2021-11-24 DIAGNOSIS — I10 PRIMARY HYPERTENSION: ICD-10-CM

## 2021-11-24 DIAGNOSIS — M54.16 LUMBAR RADICULOPATHY: ICD-10-CM

## 2021-11-24 DIAGNOSIS — I48.11 LONGSTANDING PERSISTENT ATRIAL FIBRILLATION (HCC): ICD-10-CM

## 2021-11-24 DIAGNOSIS — M54.42 CHRONIC LEFT-SIDED LOW BACK PAIN WITH LEFT-SIDED SCIATICA: ICD-10-CM

## 2021-11-24 DIAGNOSIS — G89.29 CHRONIC LEFT-SIDED LOW BACK PAIN WITH LEFT-SIDED SCIATICA: ICD-10-CM

## 2021-11-24 DIAGNOSIS — Q27.30 ARTERIOVENOUS MALFORMATION: ICD-10-CM

## 2021-11-24 DIAGNOSIS — E03.9 ACQUIRED HYPOTHYROIDISM: ICD-10-CM

## 2021-11-24 PROCEDURE — 90662 IIV NO PRSV INCREASED AG IM: CPT | Performed by: FAMILY MEDICINE

## 2021-11-24 PROCEDURE — G0008 ADMIN INFLUENZA VIRUS VAC: HCPCS | Performed by: FAMILY MEDICINE

## 2021-11-24 PROCEDURE — 99213 OFFICE O/P EST LOW 20 MIN: CPT | Performed by: FAMILY MEDICINE

## 2021-11-24 RX ORDER — HYDROCODONE BITARTRATE AND ACETAMINOPHEN 5; 325 MG/1; MG/1
1 TABLET ORAL EVERY 4 HOURS PRN
Qty: 120 TABLET | Refills: 0 | Status: SHIPPED | OUTPATIENT
Start: 2021-11-24 | End: 2022-02-28 | Stop reason: SDUPTHER

## 2021-11-24 RX ORDER — PREDNISONE 10 MG/1
TABLET ORAL
Qty: 24 TABLET | Refills: 0 | Status: SHIPPED | OUTPATIENT
Start: 2021-11-24 | End: 2022-02-28

## 2021-11-24 NOTE — PROGRESS NOTES
HPI  Tamara Singh is a 81 y.o. female who is here for follow up of multiple ongoing medical issues.  Recently cardiologist requested repeating thyroid function test.  Patient has been on lung abnormality which is being followed by thoracic surgeon.  Continues with back pain and does require pain medications off and on.  At some point will need to refer to pain management as discussed.      Review of Systems   Constitutional: Negative for fever.   Cardiovascular: Negative for chest pain.   Gastrointestinal: Negative for abdominal pain.   Genitourinary: Negative for dysuria and pelvic pain.   Musculoskeletal: Positive for back pain.   Neurological: Positive for weakness and numbness. Negative for headaches.         Past Medical History:   Diagnosis Date   • Abnormal ECG 6/05/21   • Anxiety    • Arthritis    • Asthma     Used to use inhaler but dont anymore.   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   • Coronary artery disease    • Hypertension    • Macular degeneration    • Pulmonary hypertension (HCC)    • Pulmonary hypertension (HCC)    • Sleep apnea        Past Surgical History:   Procedure Laterality Date   • CHOLECYSTECTOMY  1974       Family History   Problem Relation Age of Onset   • Heart attack Mother 86   • Stroke Mother    • Heart disease Mother 80   • Hypertension Mother    • Heart failure Mother    • Heart attack Brother 70   • Asthma Brother    • Hyperlipidemia Brother    • Thyroid disease Brother    • Heart failure Father    • Kidney disease Sister         Stage 5 on dialysis       Social History     Socioeconomic History   • Marital status: Single   Tobacco Use   • Smoking status: Never Smoker   • Smokeless tobacco: Never Used   Vaping Use   • Vaping Use: Never used   Substance and Sexual Activity   • Alcohol use: Never   • Drug use: No     Types: Hydrocodone   • Sexual activity: Never       Vitals:    11/24/21 1505   BP: 132/72   Pulse: 60   Resp: 20   Temp: 97 °F (36.1  °C)   SpO2: 98%        Body mass index is 37.93 kg/m².      Physical Exam  Vitals and nursing note reviewed.   Constitutional:       General: She is not in acute distress.     Appearance: She is well-developed. She is obese.   HENT:      Head: Normocephalic and atraumatic.      Nose:      Comments: Patient with mask.  Provider with mask and shield  Eyes:      Conjunctiva/sclera: Conjunctivae normal.      Pupils: Pupils are equal, round, and reactive to light.   Neck:      Thyroid: No thyromegaly.   Cardiovascular:      Rate and Rhythm: Normal rate and regular rhythm.      Heart sounds: Normal heart sounds.   Pulmonary:      Effort: Pulmonary effort is normal. No respiratory distress.      Breath sounds: Normal breath sounds.   Abdominal:      General: There is no distension.      Palpations: Abdomen is soft. There is no mass.      Tenderness: There is no abdominal tenderness.      Hernia: No hernia is present.   Musculoskeletal:         General: No tenderness or deformity. Normal range of motion.      Cervical back: Normal range of motion.   Lymphadenopathy:      Cervical: No cervical adenopathy.   Skin:     General: Skin is warm and dry.      Coloration: Skin is not pale.      Findings: No rash.   Neurological:      General: No focal deficit present.      Mental Status: She is alert and oriented to person, place, and time.      Motor: No abnormal muscle tone.      Coordination: Coordination normal.   Psychiatric:         Mood and Affect: Mood normal.         Behavior: Behavior normal.         Thought Content: Thought content normal.         Judgment: Judgment normal.           Assessment/Plan    Diagnoses and all orders for this visit:    1. Need for influenza vaccination (Primary)  -     Fluzone High-Dose 65+yrs    2. High risk medication use  -     CBC & Differential  -     Comprehensive Metabolic Panel    3. Primary hypertension    4. Acquired hypothyroidism  -     TSH+Free T4    5. Chronic left-sided low back  pain with left-sided sciatica  -     HYDROcodone-acetaminophen (NORCO) 5-325 MG per tablet; Take 1 tablet by mouth Every 4 (Four) Hours As Needed for Moderate Pain  or Severe Pain .  Dispense: 120 tablet; Refill: 0    6. Lumbar radiculopathy  -     predniSONE (DELTASONE) 10 MG tablet; 4 stat, then 2 bid x3 days, then 2 qam x4 days  Dispense: 24 tablet; Refill: 0    7. Arteriovenous malformation    8. Longstanding persistent atrial fibrillation (HCC)    9. Generalized osteoarthritis        Patient here for routine follow-up of above-noted issues.  Overall stable on current regimen and will get above lab testing and therapy and continue to monitor closely.  No evidence of abuse nor misuse of pain medication which will be continued with monitoring every 3 months.    Answers for HPI/ROS submitted by the patient on 11/23/2021  What is the primary reason for your visit?: Back Pain  Chronicity: chronic  Onset: more than 1 year ago  Frequency: daily  Progression since onset: waxing and waning  Pain location: sacro-iliac  Pain quality: stabbing  Radiates to: right thigh  Pain - numeric: 10/10  Pain is: worse during the day  Aggravated by: position, standing, twisting  Stiffness is present: in the morning, all day  bladder incontinence: No  bowel incontinence: No  leg pain: Yes  paresis: No  paresthesias: No  perianal numbness: No  tingling: No  weight loss: No  Risk factors: history of osteoporosis, lack of exercise, obesity, sedentary lifestyle

## 2021-11-25 LAB
ALBUMIN SERPL-MCNC: 4.8 G/DL (ref 3.7–4.7)
ALBUMIN/GLOB SERPL: 1.5 {RATIO} (ref 1.2–2.2)
ALP SERPL-CCNC: 114 IU/L (ref 44–121)
ALT SERPL-CCNC: 21 IU/L (ref 0–32)
AST SERPL-CCNC: 28 IU/L (ref 0–40)
BASOPHILS # BLD AUTO: 0 X10E3/UL (ref 0–0.2)
BASOPHILS NFR BLD AUTO: 0 %
BILIRUB SERPL-MCNC: 0.7 MG/DL (ref 0–1.2)
BUN SERPL-MCNC: 24 MG/DL (ref 8–27)
BUN/CREAT SERPL: 32 (ref 12–28)
CALCIUM SERPL-MCNC: 10.2 MG/DL (ref 8.7–10.3)
CHLORIDE SERPL-SCNC: 101 MMOL/L (ref 96–106)
CO2 SERPL-SCNC: 24 MMOL/L (ref 20–29)
CREAT SERPL-MCNC: 0.74 MG/DL (ref 0.57–1)
EOSINOPHIL # BLD AUTO: 0.1 X10E3/UL (ref 0–0.4)
EOSINOPHIL NFR BLD AUTO: 2 %
ERYTHROCYTE [DISTWIDTH] IN BLOOD BY AUTOMATED COUNT: 14.6 % (ref 11.7–15.4)
GLOBULIN SER CALC-MCNC: 3.3 G/DL (ref 1.5–4.5)
GLUCOSE SERPL-MCNC: 89 MG/DL (ref 65–99)
HCT VFR BLD AUTO: 44.8 % (ref 34–46.6)
HGB BLD-MCNC: 15.2 G/DL (ref 11.1–15.9)
IMM GRANULOCYTES # BLD AUTO: 0 X10E3/UL (ref 0–0.1)
IMM GRANULOCYTES NFR BLD AUTO: 0 %
LYMPHOCYTES # BLD AUTO: 2.9 X10E3/UL (ref 0.7–3.1)
LYMPHOCYTES NFR BLD AUTO: 49 %
MCH RBC QN AUTO: 30.9 PG (ref 26.6–33)
MCHC RBC AUTO-ENTMCNC: 33.9 G/DL (ref 31.5–35.7)
MCV RBC AUTO: 91 FL (ref 79–97)
MONOCYTES # BLD AUTO: 0.4 X10E3/UL (ref 0.1–0.9)
MONOCYTES NFR BLD AUTO: 7 %
NEUTROPHILS # BLD AUTO: 2.5 X10E3/UL (ref 1.4–7)
NEUTROPHILS NFR BLD AUTO: 42 %
PLATELET # BLD AUTO: 266 X10E3/UL (ref 150–450)
POTASSIUM SERPL-SCNC: 4.2 MMOL/L (ref 3.5–5.2)
PROT SERPL-MCNC: 8.1 G/DL (ref 6–8.5)
RBC # BLD AUTO: 4.92 X10E6/UL (ref 3.77–5.28)
SODIUM SERPL-SCNC: 140 MMOL/L (ref 134–144)
T4 FREE SERPL-MCNC: 1.19 NG/DL (ref 0.82–1.77)
TSH SERPL DL<=0.005 MIU/L-ACNC: 3.84 UIU/ML (ref 0.45–4.5)
WBC # BLD AUTO: 6 X10E3/UL (ref 3.4–10.8)

## 2021-12-01 ENCOUNTER — TELEPHONE (OUTPATIENT)
Dept: FAMILY MEDICINE CLINIC | Facility: CLINIC | Age: 81
End: 2021-12-01

## 2021-12-04 DIAGNOSIS — L97.221 VENOUS STASIS ULCER OF LEFT CALF LIMITED TO BREAKDOWN OF SKIN WITHOUT VARICOSE VEINS (HCC): ICD-10-CM

## 2021-12-04 DIAGNOSIS — I87.2 VENOUS STASIS ULCER OF LEFT CALF LIMITED TO BREAKDOWN OF SKIN WITHOUT VARICOSE VEINS (HCC): ICD-10-CM

## 2021-12-06 NOTE — TELEPHONE ENCOUNTER
Rx Refill Note  Requested Prescriptions     Pending Prescriptions Disp Refills   • silver sulfadiazine (SILVADENE, SSD) 1 % cream [Pharmacy Med Name: SILVER SULFADIAZINE 1% CREAM] 85 g 2     Sig: APPLY TOPICALLY TO THE APPROPRIATE AREA AS DIRECTED 2 (TWO) TIMES A DAY. *CM:I87.2      Last office visit with prescribing clinician: 11/24/2021      Next office visit with prescribing clinician: 2/21/2022            Jarret Morel MA  12/06/21, 07:09 EST

## 2021-12-07 ENCOUNTER — PATIENT OUTREACH (OUTPATIENT)
Dept: CASE MANAGEMENT | Facility: OTHER | Age: 81
End: 2021-12-07

## 2021-12-07 NOTE — OUTREACH NOTE
Patient Outreach    Ambulatory Case Management Note    Call placed to pt. She states she is doing well. She did have a follow up with PCP and has received her flu shot. Pt has an appt with pain management Jan 3rd and is anxious to see. She continues to be in pain. Her brother is a great deal of help for her.  Since it is difficult to go to the grocery he helps her order on line. She has macular degeneration and is unable to read due to low vision. She misses reading. Discussed different options for her on line with her local library and audible books. She is going to sign up for this. Denies any questions or concerns. Will continue to call monthly.         Meagan Wise RN  Ambulatory Case Management    12/7/2021, 11:42 EST

## 2022-01-08 DIAGNOSIS — I27.20 PULMONARY HYPERTENSION, UNSPECIFIED: ICD-10-CM

## 2022-01-08 DIAGNOSIS — F41.9 ANXIETY: ICD-10-CM

## 2022-01-10 RX ORDER — CLONAZEPAM 0.5 MG/1
0.5 TABLET ORAL NIGHTLY PRN
Qty: 60 TABLET | Refills: 2 | Status: SHIPPED | OUTPATIENT
Start: 2022-01-10 | End: 2022-06-17 | Stop reason: SDUPTHER

## 2022-01-10 RX ORDER — FUROSEMIDE 40 MG/1
TABLET ORAL
Qty: 90 TABLET | Refills: 3 | Status: SHIPPED | OUTPATIENT
Start: 2022-01-10 | End: 2022-02-14 | Stop reason: SDUPTHER

## 2022-01-10 NOTE — TELEPHONE ENCOUNTER
Rx Refill Note  Requested Prescriptions     Pending Prescriptions Disp Refills   • Lasix 40 MG tablet [Pharmacy Med Name: LASIX 40 MG TABLET] 90 tablet 3     Sig: TAKE 1 TABLETBY MOUTH EVERY MORNING   • clonazePAM (KlonoPIN) 0.5 MG tablet [Pharmacy Med Name: CLONAZEPAM 0.5 MG TABLET] 60 tablet      Sig: TAKE 1 TABLET BY MOUTH TWICE A DAY      Last office visit with prescribing clinician: 11/24/2021      Next office visit with prescribing clinician: 2/21/2022            Gemini Majano MA  01/10/22, 07:43 EST

## 2022-01-13 ENCOUNTER — PATIENT OUTREACH (OUTPATIENT)
Dept: CASE MANAGEMENT | Facility: OTHER | Age: 82
End: 2022-01-13

## 2022-01-13 NOTE — OUTREACH NOTE
Patient Outreach    Ambulatory Case Management Note    Call placed to pt who states she is doing well. She has been helping her sister who is a dialysis pt. She states she has gotten so weak she is almost bed ridden. Due to this she has had to cancel her pain management appt. Her sister is in the ED today and she is waiting to hear if she will be admitted.   She has some concern with the increase in COVID. Reminded of need to wear mask and use of . Denies any questions or concerns at this time. She does have [facility] number if needed.         Meagan Wise RN  Ambulatory Case Management    1/13/2022, 11:12 EST

## 2022-01-27 ENCOUNTER — TELEPHONE (OUTPATIENT)
Dept: FAMILY MEDICINE CLINIC | Facility: CLINIC | Age: 82
End: 2022-01-27

## 2022-01-27 NOTE — TELEPHONE ENCOUNTER
Caller: Tamara Singh    Relationship: Self    Best call back number: [phone]    What is the best time to reach you: ANY TIME    Who are you requesting to speak with (clinical staff, provider,  specific staff member): CLINICAL STAFF    What was the call regarding: PATIENT CALLED WANTING TO KNOW WHAT MEDICATION SHE CAN TAKE TO HELP WITH SLEEPING. SHE HAS A FIB AND WANTS TO TRY MELATONIN BUT WANTED TO CHECK WITH DR. CARIAS BEFORE TAKING ANY. IF THAT IS NOT OKAY, SHE WOULD LIKE HIS SUGGESTION ON WHAT SHE COULD TAKE.    PLEASE ADVISE    Do you require a callback: YES

## 2022-02-14 DIAGNOSIS — I27.20 PULMONARY HYPERTENSION, UNSPECIFIED: ICD-10-CM

## 2022-02-14 RX ORDER — FUROSEMIDE 40 MG/1
40 TABLET ORAL DAILY
Qty: 90 TABLET | Refills: 3 | Status: SHIPPED | OUTPATIENT
Start: 2022-02-14 | End: 2022-06-17 | Stop reason: SDUPTHER

## 2022-02-16 ENCOUNTER — PATIENT OUTREACH (OUTPATIENT)
Dept: CASE MANAGEMENT | Facility: OTHER | Age: 82
End: 2022-02-16

## 2022-02-16 NOTE — OUTREACH NOTE
Patient Outreach    Ambulatory Case Management Note    Call placed to pt for monthly check in. Pt states she is doing well. Pt's sister had come in to help pt care for there sister who has been ill. She is on dialysis and was recently in the hospital. She does states she is doing much better.   Discussed pts health care gaps. Pt has not had a mammogram in years after having a bad experience with her last one. The tech was new and pt ended up with an abrassion that was very painful and hard to heal. Suggested pt schedule but let  know that she wants someone experienced.   Pt has not gotten her booster and does not plan on doing so. With her last injection she had episode of afib. She continues to use all precautions when out. Denies any questions or concerns. Will follow up next month.    Meagan Wise RN  Ambulatory Case Management    2/16/2022, 10:42 EST

## 2022-02-28 ENCOUNTER — OFFICE VISIT (OUTPATIENT)
Dept: FAMILY MEDICINE CLINIC | Facility: CLINIC | Age: 82
End: 2022-02-28

## 2022-02-28 VITALS
TEMPERATURE: 96.9 F | HEART RATE: 69 BPM | OXYGEN SATURATION: 98 % | BODY MASS INDEX: 37.65 KG/M2 | HEIGHT: 62 IN | SYSTOLIC BLOOD PRESSURE: 110 MMHG | DIASTOLIC BLOOD PRESSURE: 62 MMHG | WEIGHT: 204.6 LBS | RESPIRATION RATE: 20 BRPM

## 2022-02-28 DIAGNOSIS — Q27.30 ARTERIOVENOUS MALFORMATION: ICD-10-CM

## 2022-02-28 DIAGNOSIS — M54.16 LUMBAR RADICULOPATHY: ICD-10-CM

## 2022-02-28 DIAGNOSIS — M54.42 CHRONIC LEFT-SIDED LOW BACK PAIN WITH LEFT-SIDED SCIATICA: ICD-10-CM

## 2022-02-28 DIAGNOSIS — I89.0 LYMPHEDEMA: ICD-10-CM

## 2022-02-28 DIAGNOSIS — Z79.899 HIGH RISK MEDICATION USE: Primary | ICD-10-CM

## 2022-02-28 DIAGNOSIS — M15.9 GENERALIZED OSTEOARTHRITIS: ICD-10-CM

## 2022-02-28 DIAGNOSIS — G89.29 CHRONIC LEFT-SIDED LOW BACK PAIN WITH LEFT-SIDED SCIATICA: ICD-10-CM

## 2022-02-28 DIAGNOSIS — I48.11 LONGSTANDING PERSISTENT ATRIAL FIBRILLATION: ICD-10-CM

## 2022-02-28 PROCEDURE — 99213 OFFICE O/P EST LOW 20 MIN: CPT | Performed by: FAMILY MEDICINE

## 2022-02-28 RX ORDER — HYDROCODONE BITARTRATE AND ACETAMINOPHEN 5; 325 MG/1; MG/1
1 TABLET ORAL EVERY 4 HOURS PRN
Qty: 120 TABLET | Refills: 0 | Status: SHIPPED | OUTPATIENT
Start: 2022-02-28 | End: 2022-03-03 | Stop reason: SDUPTHER

## 2022-02-28 NOTE — PROGRESS NOTES
HPI  Tamara Singh is a 82 y.o. female who is here for follow up of multiple ongoing medical issues.  Continues with chronic back pain going down the right leg.  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  Chronic atrial fibrillation on anticoagulant therapy.      Review of Systems   Cardiovascular: Positive for leg swelling.   Musculoskeletal: Positive for back pain and gait problem.   All other systems reviewed and are negative.        Past Medical History:   Diagnosis Date   • Abnormal ECG 6/05/21   • Anxiety    • Arthritis    • Asthma     Used to use inhaler but dont anymore.   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   • Coronary artery disease    • Hypertension    • Macular degeneration    • Pulmonary hypertension (HCC)    • Pulmonary hypertension (HCC)    • Sleep apnea        Past Surgical History:   Procedure Laterality Date   • CHOLECYSTECTOMY  1974       Family History   Problem Relation Age of Onset   • Heart attack Mother 86   • Stroke Mother    • Heart disease Mother 80   • Hypertension Mother    • Heart failure Mother    • Heart attack Brother 70   • Asthma Brother    • Hyperlipidemia Brother    • Thyroid disease Brother    • Heart failure Father    • Kidney disease Sister         Stage 5 on dialysis       Social History     Socioeconomic History   • Marital status: Single   Tobacco Use   • Smoking status: Never Smoker   • Smokeless tobacco: Never Used   Vaping Use   • Vaping Use: Never used   Substance and Sexual Activity   • Alcohol use: Never   • Drug use: No     Types: Hydrocodone   • Sexual activity: Never       Vitals:    02/28/22 1307   BP: 110/62   Pulse: 69   Resp: 20   Temp: 96.9 °F (36.1 °C)   SpO2: 98%        Body mass index is 37.42 kg/m².      Physical Exam  Vitals and nursing note reviewed.   Constitutional:       General: She is not in acute distress.     Appearance: She is well-developed.   HENT:      Head: Normocephalic and atraumatic.       Nose:      Comments: Patient with mask.  Provider with mask and shield  Eyes:      Conjunctiva/sclera: Conjunctivae normal.      Pupils: Pupils are equal, round, and reactive to light.   Neck:      Thyroid: No thyromegaly.   Cardiovascular:      Rate and Rhythm: Normal rate.      Heart sounds: Normal heart sounds.   Pulmonary:      Effort: Pulmonary effort is normal. No respiratory distress.      Breath sounds: Normal breath sounds. No wheezing, rhonchi or rales.   Abdominal:      General: There is no distension.      Palpations: Abdomen is soft. There is no mass.      Tenderness: There is no abdominal tenderness.      Hernia: No hernia is present.   Musculoskeletal:         General: No tenderness or deformity.      Cervical back: Normal range of motion.      Right lower leg: Edema present.      Left lower leg: Edema present.      Comments: Ambulates with cane   Lymphadenopathy:      Cervical: No cervical adenopathy.   Skin:     General: Skin is warm and dry.      Coloration: Skin is not pale.      Findings: No rash.   Neurological:      General: No focal deficit present.      Mental Status: She is alert and oriented to person, place, and time.      Motor: No abnormal muscle tone.      Coordination: Coordination normal.   Psychiatric:         Mood and Affect: Mood normal.         Behavior: Behavior normal.         Thought Content: Thought content normal.         Judgment: Judgment normal.           Assessment/Plan    Diagnoses and all orders for this visit:    1. High risk medication use (Primary)    2. Chronic left-sided low back pain with left-sided sciatica  -     HYDROcodone-acetaminophen (NORCO) 5-325 MG per tablet; Take 1 tablet by mouth Every 4 (Four) Hours As Needed for Moderate Pain  or Severe Pain .  Dispense: 120 tablet; Refill: 0    3. Arteriovenous malformation    4. Longstanding persistent atrial fibrillation (HCC)    5. Generalized osteoarthritis    6. Lumbar radiculopathy    7.  Lymphedema    Patient with multiple ongoing medical issues some of which are noted above.  Overall stable on current regimen which will be continued including follow-up in 3 months.  Patient is due for Medicare wellness evaluation and also need to verify Covid booster as well as other health maintenance updates.        Answers for HPI/ROS submitted by the patient on 2/28/2022  What is the primary reason for your visit?: High Blood Pressure

## 2022-03-03 ENCOUNTER — TELEPHONE (OUTPATIENT)
Dept: FAMILY MEDICINE CLINIC | Facility: CLINIC | Age: 82
End: 2022-03-03

## 2022-03-03 DIAGNOSIS — M54.42 CHRONIC LEFT-SIDED LOW BACK PAIN WITH LEFT-SIDED SCIATICA: ICD-10-CM

## 2022-03-03 DIAGNOSIS — G89.29 CHRONIC LEFT-SIDED LOW BACK PAIN WITH LEFT-SIDED SCIATICA: ICD-10-CM

## 2022-03-03 RX ORDER — HYDROCODONE BITARTRATE AND ACETAMINOPHEN 5; 325 MG/1; MG/1
1 TABLET ORAL EVERY 4 HOURS PRN
Qty: 120 TABLET | Refills: 0 | Status: SHIPPED | OUTPATIENT
Start: 2022-03-03 | End: 2022-04-22 | Stop reason: SDUPTHER

## 2022-03-03 NOTE — TELEPHONE ENCOUNTER
Caller: Tamara Singh    Relationship: Self    Best call back number: [phone]    Requested Prescriptions:   Requested Prescriptions     Pending Prescriptions Disp Refills   • HYDROcodone-acetaminophen (NORCO) 5-325 MG per tablet 120 tablet 0     Sig: Take 1 tablet by mouth Every 4 (Four) Hours As Needed for Moderate Pain  or Severe Pain .        [address] [phone]  - [phone] [facility]     Additional details provided by patient: PATIENT NEEDS THIS MEDICATION CALLED IN [facility] THE [facility] DID NOT HAVE THE MEDICATION IN STOCK. PATIENT ONLY HAS 20 PILLS LEFT    Does the patient have less than a 3 day supply:  [] Yes  [x] No    Estela Wright Rep   03/03/22 12:53 EST

## 2022-03-06 DIAGNOSIS — I87.2 VENOUS STASIS ULCER OF LEFT CALF LIMITED TO BREAKDOWN OF SKIN WITHOUT VARICOSE VEINS: ICD-10-CM

## 2022-03-06 DIAGNOSIS — L97.221 VENOUS STASIS ULCER OF LEFT CALF LIMITED TO BREAKDOWN OF SKIN WITHOUT VARICOSE VEINS: ICD-10-CM

## 2022-03-07 NOTE — TELEPHONE ENCOUNTER
Rx Refill Note  Requested Prescriptions     Pending Prescriptions Disp Refills   • silver sulfadiazine (SILVADENE, SSD) 1 % cream [Pharmacy Med Name: SILVER SULFADIAZINE 1% CREAM] 85 g 2     Sig: APPLY TOPICALLY TO THE APPROPRIATE AREA AS DIRECTED 2 (TWO) TIMES A DAY. *CM:I87.2      Last office visit with prescribing clinician: 2/28/2022      Next office visit with prescribing clinician: 6/17/2022            Gemini Majano MA  03/07/22, 08:45 EST

## 2022-03-11 ENCOUNTER — TELEPHONE (OUTPATIENT)
Dept: FAMILY MEDICINE CLINIC | Facility: CLINIC | Age: 82
End: 2022-03-11

## 2022-03-11 DIAGNOSIS — G89.29 CHRONIC LEFT-SIDED LOW BACK PAIN WITH LEFT-SIDED SCIATICA: ICD-10-CM

## 2022-03-11 DIAGNOSIS — M54.42 CHRONIC LEFT-SIDED LOW BACK PAIN WITH LEFT-SIDED SCIATICA: ICD-10-CM

## 2022-03-11 RX ORDER — PREDNISONE 10 MG/1
TABLET ORAL
Qty: 24 TABLET | Refills: 0 | Status: SHIPPED | OUTPATIENT
Start: 2022-03-11 | End: 2022-03-25

## 2022-03-11 NOTE — TELEPHONE ENCOUNTER
Caller: Tamara Singh    Relationship: Self    Best call back number: [phone]    What medication are you requesting: PREDNISONE     What are your current symptoms: BACK PAIN    How long have you been experiencing symptoms:4 DAYS    Have you had these symptoms before:    [x] Yes  [] No    Have you been treated for these symptoms before:   [x] Yes  [] No    If a prescription is needed, what is your preferred pharmacy and phone number:   [facility]ACY #[id number] [address] [phone] [facility] [phone]

## 2022-03-14 RX ORDER — CARISOPRODOL 350 MG/1
350 TABLET ORAL 4 TIMES DAILY PRN
Qty: 120 TABLET | Refills: 0 | Status: SHIPPED | OUTPATIENT
Start: 2022-03-14 | End: 2022-04-22 | Stop reason: SDUPTHER

## 2022-03-14 NOTE — TELEPHONE ENCOUNTER
Rx Refill Note  Requested Prescriptions     Pending Prescriptions Disp Refills   • carisoprodol (SOMA) 350 MG tablet [Pharmacy Med Name: CARISOPRODOL 350 MG TABLET] 120 tablet      Sig: TAKE 1 TABLET BY MOUTH 4 (FOUR) TIMES A DAY AS NEEDED FOR MUSCLE SPASMS.      Last office visit with prescribing clinician: 2/28/2022      Next office visit with prescribing clinician: 6/17/2022            Gemini Majano MA  03/14/22, 07:36 EDT

## 2022-03-22 ENCOUNTER — PATIENT OUTREACH (OUTPATIENT)
Dept: CASE MANAGEMENT | Facility: OTHER | Age: 82
End: 2022-03-22

## 2022-03-22 NOTE — OUTREACH NOTE
AMBULATORY CASE MANAGEMENT NOTE    Name and Relationship of Patient/Support Person: Tamara Singh J - Self    Patient Outreach    Call placed to pt for monthly check in. Pt states she is doing well. She continues to assist her sister with her care. Denies any needs at this time. Discussed graduation and she is agreeable.   Patient has met care plan goals, all care gaps have been addressed, and patient has a strong sense of health self-management. The patient's annual wellness visit is scheduled. Mychart activation has been discussed and patient is agreeable. Patient has received advanced care planning materials or has an active advanced care plan in place          PHILIPP MCKEON  Ambulatory Case Management    3/22/2022, 13:37 EDT

## 2022-03-25 ENCOUNTER — OFFICE VISIT (OUTPATIENT)
Dept: CARDIOLOGY | Facility: CLINIC | Age: 82
End: 2022-03-25

## 2022-03-25 VITALS
HEART RATE: 57 BPM | BODY MASS INDEX: 37.73 KG/M2 | WEIGHT: 205 LBS | HEIGHT: 62 IN | DIASTOLIC BLOOD PRESSURE: 78 MMHG | SYSTOLIC BLOOD PRESSURE: 126 MMHG

## 2022-03-25 DIAGNOSIS — I83.11 VARICOSE VEINS OF BOTH LOWER EXTREMITIES WITH INFLAMMATION: ICD-10-CM

## 2022-03-25 DIAGNOSIS — I48.19 ATRIAL FIBRILLATION, PERSISTENT: Primary | ICD-10-CM

## 2022-03-25 DIAGNOSIS — I27.20 PULMONARY HYPERTENSION: ICD-10-CM

## 2022-03-25 DIAGNOSIS — I87.2 VENOUS STASIS DERMATITIS OF BOTH LOWER EXTREMITIES: ICD-10-CM

## 2022-03-25 DIAGNOSIS — I83.12 VARICOSE VEINS OF BOTH LOWER EXTREMITIES WITH INFLAMMATION: ICD-10-CM

## 2022-03-25 PROCEDURE — 99214 OFFICE O/P EST MOD 30 MIN: CPT | Performed by: INTERNAL MEDICINE

## 2022-03-25 PROCEDURE — 93000 ELECTROCARDIOGRAM COMPLETE: CPT | Performed by: INTERNAL MEDICINE

## 2022-03-25 NOTE — PROGRESS NOTES
"    Subjective:     Encounter Date:03/25/22      Patient ID: Tamara Singh is a 82 y.o. female.    Chief Complaint:  History of Present Illness    Dear Dr. Sauceda,    I had the pleasure of seeing this patient in the office today for persistent atrial fibrillation.    Since her last visit patient feels like she has been doing well.  She continues to work on weight loss and has lost another 5 to 10 pounds since her last visit.  She denies any chest pain or chest discomfort.  No shortness of breath.  She feels like she is doing well.    She also has a history of sleep apnea but is not currently using CPAP.  She thinks is probably been 3 years since she was on CPAP.  She had lost some weight and so she thought she may not need it.      This patient has no other known cardiac history.  This patient has no history of coronary artery disease, congestive heart failure, rheumatic fever, rheumatic heart disease, congenital heart disease or heart murmur.  This patient has never required invasive cardiovascular evaluation.    The following portions of the patient's history were reviewed and updated as appropriate: allergies, current medications, past family history, past medical history, past social history, past surgical history and problem list.      ECG 12 Lead    Date/Time: 3/25/2022 2:06 PM  Performed by: Evens Miller III, MD  Authorized by: Evens Miller III, MD   Comparison: compared with previous ECG   Similar to previous ECG  Rhythm: atrial fibrillation  Rate: normal  Conduction: conduction normal  QRS axis: normal  Other findings: non-specific ST-T wave changes and poor R wave progression    Clinical impression: abnormal EKG               Objective:     Vitals:    03/25/22 1322   BP: 126/78   Pulse: 57   Weight: 93 kg (205 lb)   Height: 157.5 cm (62\")     Body mass index is 37.49 kg/m².      Vitals reviewed.   Constitutional:       General: Not in acute distress.     Appearance: Well-developed. Not diaphoretic. "   Eyes:      General:         Right eye: No discharge.         Left eye: No discharge.      Conjunctiva/sclera: Conjunctivae normal.      Pupils: Pupils are equal, round, and reactive to light.   HENT:      Head: Normocephalic and atraumatic.      Nose: Nose normal.   Neck:      Thyroid: No thyromegaly.      Trachea: No tracheal deviation.      Lymphadenopathy: No cervical adenopathy.   Pulmonary:      Effort: Pulmonary effort is normal. No respiratory distress.      Breath sounds: Normal breath sounds. No stridor.   Chest:      Chest wall: Not tender to palpatation.   Cardiovascular:      Normal rate. Irregular rhythm.      Murmurs: There is no murmur.      . No S3 gallop. No click. No rub.   Pulses:     Intact distal pulses.   Edema:     Peripheral edema present.  Abdominal:      General: Bowel sounds are normal. There is no distension.      Palpations: Abdomen is soft. There is no abdominal mass.      Tenderness: There is no abdominal tenderness. There is no guarding or rebound.   Musculoskeletal: Normal range of motion.         General: No tenderness or deformity.      Cervical back: Normal range of motion and neck supple. Skin:     General: Skin is warm and dry.      Findings: No erythema or rash.   Neurological:      Mental Status: Alert and oriented to person, place, and time.      Deep Tendon Reflexes: Reflexes are normal and symmetric.   Psychiatric:         Thought Content: Thought content normal.         Data and records reviewed:     Lab Results   Component Value Date    GLUCOSE 89 11/24/2021    BUN 24 11/24/2021    CREATININE 0.74 11/24/2021    EGFRIFNONA 77 11/24/2021    EGFRIFAFRI 88 11/24/2021    BCR 32 (H) 11/24/2021    K 4.2 11/24/2021    CO2 24 11/24/2021    CALCIUM 10.2 11/24/2021    PROTENTOTREF 8.1 11/24/2021    ALBUMIN 4.8 (H) 11/24/2021    LABIL2 1.5 11/24/2021    AST 28 11/24/2021    ALT 21 11/24/2021     No results found for: CHOL  No results found for: TRIG  No results found for: HDL  No  results found for: LDL  No results found for: VLDL  No results found for: LDLHDL  CBC    CBC 6/6/21 11/24/21   WBC 6.49 6.0   RBC 4.57 4.92   Hemoglobin 14.4 15.2   Hematocrit 43.0 44.8   MCV 94.1 91   MCH 31.5 30.9   MCHC 33.5 33.9   RDW 14.1 14.6   Platelets 252 266           No radiology results for the last 90 days.  Results for orders placed during the hospital encounter of 07/16/21    Adult Transthoracic Echo Complete W/ Cont if Necessary Per Protocol    Interpretation Summary  · Calculated right ventricular systolic pressure from tricuspid regurgitation is 44.7 mmHg.  · Estimated left ventricular EF = 70% Left ventricular systolic function is normal.  · Left ventricular diastolic function was indeterminate.  · Mild pulmonary hypertension is present.  · There is mild, bileaflet mitral valve thickening present.          Assessment:          Diagnosis Plan   1. Atrial fibrillation, persistent (HCC)  rivaroxaban (Xarelto) 20 MG tablet    ECG 12 Lead   2. Pulmonary hypertension (HCC)  rivaroxaban (Xarelto) 20 MG tablet    ECG 12 Lead   3. Varicose veins of both lower extremities with inflammation  ECG 12 Lead   4. Venous stasis dermatitis of both lower extremities  ECG 12 Lead          Plan:       1.  Persistent atrial fibrillation-cont Xarelto, rate appropriate with no rate control, consistent with sick sinus syndrome  2.  Obstructive sleep apnea, not on CPAP, no recent evaluation, we referred to sleep medicine, not yet been in to be seen  3.  Chronic lymphedema-  4.  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 Sy      Thank you very much for allowing us to participate in the care of this pleasant patient.  Please don't hesitate to call if I can be of assistance in any way.      Current Outpatient Medications:   •  amLODIPine-benazepril (LOTREL) 10-40 MG per capsule, TAKE ONE CAPSULE BY MOUTH EVERY DAY, Disp: 90 capsule, Rfl: 3  •  carisoprodol (SOMA) 350 MG tablet, TAKE 1 TABLET BY MOUTH 4 (FOUR) TIMES A DAY AS NEEDED  FOR MUSCLE SPASMS., Disp: 120 tablet, Rfl: 0  •  clonazePAM (KlonoPIN) 0.5 MG tablet, Take 1 tablet by mouth At Night As Needed for Anxiety., Disp: 60 tablet, Rfl: 2  •  furosemide (Lasix) 40 MG tablet, Take 1 tablet by mouth Daily., Disp: 90 tablet, Rfl: 3  •  HYDROcodone-acetaminophen (NORCO) 5-325 MG per tablet, Take 1 tablet by mouth Every 4 (Four) Hours As Needed for Moderate Pain  or Severe Pain ., Disp: 120 tablet, Rfl: 0  •  ketorolac (ACULAR) 0.5 % ophthalmic solution, INSTILL 1 DROP INTO LEFT EYE 4 TIMES A DAY, Disp: , Rfl: 3  •  multivitamins-minerals (PRESERVISION AREDS 2) capsule capsule, Take 1 capsule by mouth 2 (Two) Times a Day., Disp: , Rfl:   •  PROAIR  (90 BASE) MCG/ACT inhaler, INHALE 2 PUFFS BY MOUTH 4 TIMES DAILY AS NEEDED, Disp: 1 inhaler, Rfl: 5  •  rivaroxaban (Xarelto) 20 MG tablet, Take 1 tablet by mouth Daily., Disp: 90 tablet, Rfl: 3  •  silver sulfadiazine (SILVADENE, SSD) 1 % cream, APPLY TOPICALLY TO THE APPROPRIATE AREA AS DIRECTED 2 (TWO) TIMES A DAY. *CM:I87.2, Disp: 85 g, Rfl: 2  •  Vitamin D, Cholecalciferol, 50 MCG (2000 UT) capsule, Take 1 tablet by mouth Daily., Disp: , Rfl:          Return in about 1 year (around 3/25/2023).

## 2022-04-01 ENCOUNTER — TELEPHONE (OUTPATIENT)
Dept: FAMILY MEDICINE CLINIC | Facility: CLINIC | Age: 82
End: 2022-04-01

## 2022-04-01 RX ORDER — PREDNISONE 10 MG/1
TABLET ORAL
Qty: 24 TABLET | Refills: 0 | Status: SHIPPED | OUTPATIENT
Start: 2022-04-01 | End: 2022-04-28 | Stop reason: SDUPTHER

## 2022-04-01 NOTE — TELEPHONE ENCOUNTER
Caller: Tamara Singh    Relationship: Self    Best call back number:     What medication are you requesting: PREDNISONE     What are your current symptoms: LOWER BACK PAIN    How long have you been experiencing symptoms: 4 DAYS    Have you had these symptoms before:    [x] Yes  [] No    Have you been treated for these symptoms before:   [x] Yes  [] No    If a prescription is needed, what is your preferred pharmacy and phone number:  [facility]  [address] [place] [address]  [phone]  Additional notes: PATIENT IS CALLING IN STATING THAT SHE HAS BEEN HAVING BACK PAIN FOR 4 DAYS NOW AND WANTS TO KNOW IF SOMETHING CAN BE CALLED IN FOR HER.

## 2022-04-22 ENCOUNTER — OFFICE VISIT (OUTPATIENT)
Dept: FAMILY MEDICINE CLINIC | Facility: CLINIC | Age: 82
End: 2022-04-22

## 2022-04-22 VITALS
RESPIRATION RATE: 16 BRPM | SYSTOLIC BLOOD PRESSURE: 112 MMHG | DIASTOLIC BLOOD PRESSURE: 80 MMHG | OXYGEN SATURATION: 97 % | HEIGHT: 62 IN | HEART RATE: 75 BPM | BODY MASS INDEX: 38.46 KG/M2 | WEIGHT: 209 LBS

## 2022-04-22 DIAGNOSIS — Z79.899 HIGH RISK MEDICATION USE: ICD-10-CM

## 2022-04-22 DIAGNOSIS — I27.20 PULMONARY HYPERTENSION: ICD-10-CM

## 2022-04-22 DIAGNOSIS — G89.29 CHRONIC LEFT-SIDED LOW BACK PAIN WITH LEFT-SIDED SCIATICA: ICD-10-CM

## 2022-04-22 DIAGNOSIS — M54.42 CHRONIC LEFT-SIDED LOW BACK PAIN WITH LEFT-SIDED SCIATICA: ICD-10-CM

## 2022-04-22 DIAGNOSIS — I48.11 LONGSTANDING PERSISTENT ATRIAL FIBRILLATION: ICD-10-CM

## 2022-04-22 DIAGNOSIS — Q27.30 ARTERIOVENOUS MALFORMATION: ICD-10-CM

## 2022-04-22 DIAGNOSIS — M54.16 LUMBAR RADICULOPATHY: Primary | ICD-10-CM

## 2022-04-22 DIAGNOSIS — D75.1 SECONDARY POLYCYTHEMIA: ICD-10-CM

## 2022-04-22 DIAGNOSIS — E53.8 COBALAMIN DEFICIENCY: ICD-10-CM

## 2022-04-22 DIAGNOSIS — I10 PRIMARY HYPERTENSION: ICD-10-CM

## 2022-04-22 DIAGNOSIS — I89.0 LYMPHEDEMA: ICD-10-CM

## 2022-04-22 DIAGNOSIS — E66.9 OBESITY (BMI 30-39.9): ICD-10-CM

## 2022-04-22 PROCEDURE — 99213 OFFICE O/P EST LOW 20 MIN: CPT | Performed by: FAMILY MEDICINE

## 2022-04-22 RX ORDER — HYDROCODONE BITARTRATE AND ACETAMINOPHEN 5; 325 MG/1; MG/1
1 TABLET ORAL EVERY 4 HOURS PRN
Qty: 120 TABLET | Refills: 0 | Status: SHIPPED | OUTPATIENT
Start: 2022-04-22 | End: 2022-06-02 | Stop reason: SDUPTHER

## 2022-04-22 RX ORDER — CARISOPRODOL 350 MG/1
350 TABLET ORAL 4 TIMES DAILY PRN
Qty: 120 TABLET | Refills: 0 | Status: SHIPPED | OUTPATIENT
Start: 2022-04-22 | End: 2022-06-17 | Stop reason: SDUPTHER

## 2022-04-22 NOTE — PROGRESS NOTES
HPI  Tamara Singh is a 82 y.o. female who is here for follow up of multiple ongoing medical issues.  Followed by cardiologist for atrial fibrillation also.  Main complaint is chronic back pain and requesting refill of Soma and pain medication.  Discussed with patient will need to find new primary care physician by July 1 since I will be retiring.      Review of Systems   Constitutional: Negative for fever.   Cardiovascular: Positive for leg swelling. Negative for chest pain.   Gastrointestinal: Negative for abdominal pain.   Genitourinary: Negative for dysuria and pelvic pain.   Musculoskeletal: Positive for back pain.   Neurological: Positive for weakness and numbness. Negative for headaches.         Past Medical History:   Diagnosis Date   • Abnormal ECG 6/05/21   • Anxiety    • Arthritis    • Asthma     Used to use inhaler but dont anymore.   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   • Coronary artery disease    • Hypertension    • Macular degeneration    • Pulmonary hypertension (HCC)    • Pulmonary hypertension (HCC)    • Sleep apnea        Past Surgical History:   Procedure Laterality Date   • CHOLECYSTECTOMY  1974       Family History   Problem Relation Age of Onset   • Heart attack Mother 86   • Stroke Mother    • Heart disease Mother 80   • Hypertension Mother    • Heart failure Mother    • Heart attack Brother 70   • Asthma Brother    • Hyperlipidemia Brother    • Thyroid disease Brother    • Heart failure Father    • Kidney disease Sister         Stage 5 on dialysis       Social History     Socioeconomic History   • Marital status: Single   Tobacco Use   • Smoking status: Never Smoker   • Smokeless tobacco: Never Used   Vaping Use   • Vaping Use: Never used   Substance and Sexual Activity   • Alcohol use: Never   • Drug use: No     Types: Hydrocodone   • Sexual activity: Never       Vitals:    04/22/22 1012   BP: 112/80   Pulse: 75   Resp: 16   SpO2: 97%        Body mass  index is 38.23 kg/m².      Physical Exam  Vitals and nursing note reviewed.   Constitutional:       General: She is not in acute distress.     Appearance: She is well-developed. She is obese.   HENT:      Head: Normocephalic and atraumatic.   Eyes:      Conjunctiva/sclera: Conjunctivae normal.      Pupils: Pupils are equal, round, and reactive to light.   Neck:      Thyroid: No thyromegaly.   Cardiovascular:      Rate and Rhythm: Normal rate and regular rhythm.      Heart sounds: Normal heart sounds.   Pulmonary:      Effort: Pulmonary effort is normal. No respiratory distress.      Breath sounds: Normal breath sounds.   Abdominal:      General: There is no distension.      Palpations: Abdomen is soft. There is no mass.      Tenderness: There is no abdominal tenderness.      Hernia: No hernia is present.   Musculoskeletal:         General: No tenderness or deformity. Normal range of motion.      Cervical back: Normal range of motion.      Right lower leg: Edema present.      Left lower leg: Edema present.      Comments: Ambulates with a cane   Lymphadenopathy:      Cervical: No cervical adenopathy.   Skin:     General: Skin is warm and dry.      Coloration: Skin is not pale.      Findings: No rash.   Neurological:      General: No focal deficit present.      Mental Status: She is alert and oriented to person, place, and time.      Motor: No abnormal muscle tone.      Coordination: Coordination normal.   Psychiatric:         Mood and Affect: Mood normal.         Behavior: Behavior normal.         Thought Content: Thought content normal.         Judgment: Judgment normal.           Assessment/Plan    Diagnoses and all orders for this visit:    1. Lumbar radiculopathy (Primary)    2. Longstanding persistent atrial fibrillation (HCC)    3. Secondary polycythemia  -     CBC & Differential    4. Lymphedema  -     Comprehensive Metabolic Panel    5. Pulmonary hypertension (HCC)    6. High risk medication use  -     CBC &  Differential  -     Comprehensive Metabolic Panel    7. Arteriovenous malformation    8. Primary hypertension    9. Cobalamin deficiency  -     CBC & Differential    10. Chronic left-sided low back pain with left-sided sciatica  -     carisoprodol (SOMA) 350 MG tablet; Take 1 tablet by mouth 4 (Four) Times a Day As Needed for Muscle Spasms.  Dispense: 120 tablet; Refill: 0  -     HYDROcodone-acetaminophen (NORCO) 5-325 MG per tablet; Take 1 tablet by mouth Every 4 (Four) Hours As Needed for Moderate Pain  or Severe Pain .  Dispense: 120 tablet; Refill: 0    11. Obesity (BMI 30-39.9)        Patient here for routine follow-up of multiple medical issues currently with exacerbation of back pain and requesting refill of pain medication and Soma.  This was again discussed and especially will need to find new provider especially for controlled medications.  We will get routine labs as above and keep follow-up appointment in 2 months.    Answers for HPI/ROS submitted by the patient on 4/21/2022  What is the primary reason for your visit?: Back Pain  Chronicity: recurrent  Onset: more than 1 year ago  Frequency: constantly  Progression since onset: waxing and waning  Pain location: lumbar spine  Pain quality: stabbing  Radiates to: right thigh  Pain - numeric: 10/10  Aggravated by: bending, position, lying down, sitting, standing, twisting  Stiffness is present: all day  bladder incontinence: No  bowel incontinence: No  leg pain: Yes  paresis: No  paresthesias: Yes  perianal numbness: No  tingling: No  weight loss: No  Risk factors: history of osteoporosis, history of steroid use, lack of exercise, obesity, sedentary lifestyle

## 2022-04-23 LAB
ALBUMIN SERPL-MCNC: 4.6 G/DL (ref 3.6–4.6)
ALBUMIN/GLOB SERPL: 1.6 {RATIO} (ref 1.2–2.2)
ALP SERPL-CCNC: 89 IU/L (ref 44–121)
ALT SERPL-CCNC: 17 IU/L (ref 0–32)
AST SERPL-CCNC: 31 IU/L (ref 0–40)
BASOPHILS # BLD AUTO: 0 X10E3/UL (ref 0–0.2)
BASOPHILS NFR BLD AUTO: 1 %
BILIRUB SERPL-MCNC: 0.5 MG/DL (ref 0–1.2)
BUN SERPL-MCNC: 19 MG/DL (ref 8–27)
BUN/CREAT SERPL: 24 (ref 12–28)
CALCIUM SERPL-MCNC: 10.2 MG/DL (ref 8.7–10.3)
CHLORIDE SERPL-SCNC: 99 MMOL/L (ref 96–106)
CO2 SERPL-SCNC: 25 MMOL/L (ref 20–29)
CREAT SERPL-MCNC: 0.79 MG/DL (ref 0.57–1)
EGFRCR SERPLBLD CKD-EPI 2021: 75 ML/MIN/1.73
EOSINOPHIL # BLD AUTO: 0.1 X10E3/UL (ref 0–0.4)
EOSINOPHIL NFR BLD AUTO: 1 %
ERYTHROCYTE [DISTWIDTH] IN BLOOD BY AUTOMATED COUNT: 14.1 % (ref 11.7–15.4)
GLOBULIN SER CALC-MCNC: 2.9 G/DL (ref 1.5–4.5)
GLUCOSE SERPL-MCNC: 122 MG/DL (ref 65–99)
HCT VFR BLD AUTO: 42.7 % (ref 34–46.6)
HGB BLD-MCNC: 14.2 G/DL (ref 11.1–15.9)
IMM GRANULOCYTES # BLD AUTO: 0 X10E3/UL (ref 0–0.1)
IMM GRANULOCYTES NFR BLD AUTO: 0 %
LYMPHOCYTES # BLD AUTO: 2.1 X10E3/UL (ref 0.7–3.1)
LYMPHOCYTES NFR BLD AUTO: 32 %
MCH RBC QN AUTO: 30.7 PG (ref 26.6–33)
MCHC RBC AUTO-ENTMCNC: 33.3 G/DL (ref 31.5–35.7)
MCV RBC AUTO: 92 FL (ref 79–97)
MONOCYTES # BLD AUTO: 0.5 X10E3/UL (ref 0.1–0.9)
MONOCYTES NFR BLD AUTO: 8 %
NEUTROPHILS # BLD AUTO: 3.9 X10E3/UL (ref 1.4–7)
NEUTROPHILS NFR BLD AUTO: 58 %
PLATELET # BLD AUTO: 304 X10E3/UL (ref 150–450)
POTASSIUM SERPL-SCNC: 4.3 MMOL/L (ref 3.5–5.2)
PROT SERPL-MCNC: 7.5 G/DL (ref 6–8.5)
RBC # BLD AUTO: 4.63 X10E6/UL (ref 3.77–5.28)
SODIUM SERPL-SCNC: 139 MMOL/L (ref 134–144)
WBC # BLD AUTO: 6.7 X10E3/UL (ref 3.4–10.8)

## 2022-04-28 ENCOUNTER — DOCUMENTATION (OUTPATIENT)
Dept: PHYSICAL THERAPY | Facility: HOSPITAL | Age: 82
End: 2022-04-28

## 2022-04-28 ENCOUNTER — TELEPHONE (OUTPATIENT)
Dept: FAMILY MEDICINE CLINIC | Facility: CLINIC | Age: 82
End: 2022-04-28

## 2022-04-28 DIAGNOSIS — I89.0 LYMPHEDEMA: Primary | ICD-10-CM

## 2022-04-28 RX ORDER — PREDNISONE 10 MG/1
TABLET ORAL
Qty: 24 TABLET | Refills: 0 | Status: SHIPPED | OUTPATIENT
Start: 2022-04-28 | End: 2022-06-17

## 2022-04-28 NOTE — THERAPY DISCHARGE NOTE
Outpatient Physical Therapy Discharge Summary         Patient Name: Tamara Singh  : 1940  MRN: [id number]    Today's Date: 2022    Visit Dx:    ICD-10-CM ICD-9-CM   1. Lymphedema  I89.0 457.1        PT OP Goals     Row Name 22 0800          PT Short Term Goals    STG Date to Achieve 21  -KD     STG 1 Pt/family independent and compliant with self-wrapping techniques of compression bandages for improved self management of condition.  -KD     STG 1 Progress Not Met  -KD     STG 1 Progress Comments Pt did not return for therapy.  -KD     STG 2 Pt will demo decreased net edema of BLE's >/= 5-10cm for decrease in edema, symptoms, decreased risk of infecftion, and improved skin care/ transition to self care of condition.  -KD     STG 2 Progress Not Met  -KD     STG 2 Progress Comments Pt did not return for therapy.  -KD            Long Term Goals    LTG Date to Achieve 21  -KD     LTG 1 Pt will demo proper awareness of condition and precautions for improved prevention, management, care of symptoms.  -KD     LTG 1 Progress Not Met  -KD     LTG 1 Progress Comments Pt did not return for therapy.  -KD     LTG 2 Pt will demo decreased net edema of BLE's >/= 11-20cm for decrease in edema, symptoms, decreased risk of infection, and improved skin care/ transition to self-care of condition.  -KD     LTG 2 Progress Not Met  -KD     LTG 2 Progress Comments Pt did not return for therapy.  -KD     LTG 3 Pt/caregiver independent and compliant with use and careof compression garment or velcro device as needed to promote self-care independence.  -KD     LTG 3 Progress Not Met  -KD     LTG 3 Progress Comments Pt did not return for therapy.  -KD           User Key  (r) = Recorded By, (t) = Taken By, (c) = Cosigned By    Initials Name Provider Type    Tracey Holt, GUILLERMO Physical Therapist                OP PT Discharge Summary  Reason for Discharge: other (comment) (Pt did not return for  therapy.)  Outcomes Achieved: Other (Pt did not return for therapy.)  Discharge Destination: Unknown  Discharge Instructions/Additional Comments: Pt did not return for therapy.      Time Calculation:                    Tracey Bennett, PT  4/28/2022

## 2022-04-28 NOTE — TELEPHONE ENCOUNTER
Caller: Tamara Singh    Relationship: Self    Best call back number: [phone] (H)    What medication are you requesting: PREDNISONE    What are your current symptoms: LOWER BACK PAIN AND PAIN DOWN RIGHT THIGH    How long have you been experiencing symptoms: 4 DAYS    Have you had these symptoms before:    [x] Yes  [] No    Have you been treated for these symptoms before:   [x] Yes  [] No    If a prescription is needed, what is your preferred pharmacy and phone number: [address] [phone] [facility] [phone] FX     Additional notes: PATIENT IS ASKING FOR THIS TO BE CALLED INTO HER PHARMACY ASAP, PLEASE ADVISE PATIENT IF THIS BE DONE ASAP

## 2022-05-09 ENCOUNTER — APPOINTMENT (OUTPATIENT)
Dept: WOUND CARE | Facility: HOSPITAL | Age: 82
End: 2022-05-09

## 2022-05-18 NOTE — THERAPY EVALUATION
Physical Therapy Lymphedema Initial Evaluation  [facility]     Patient Name: Tamara Singh  : 1940  MRN: [id number]  Today's Date: 2021      Visit Date: 2021    Visit Dx:    ICD-10-CM ICD-9-CM   1. Lymphedema  I89.0 457.1   2. Venous insufficiency  I87.2 459.81       Patient Active Problem List   Diagnosis   • Lumbar radiculopathy   • Anxiety   • Secondary polycythemia   • Arteriovenous malformation   • Venous stasis dermatitis   • Eczema   • Hypertension   • Hypoglycemia   • Knee pain   • Low back pain   • Lymphedema   • Obesity, morbid, BMI 50 or higher (CMS/HCC)   • Obstructive sleep apnea syndrome   • Pain in thoracic spine   • Varicose veins of lower extremity with inflammation   • Cobalamin deficiency   • Vitamin D deficiency   • Muscle spasms of both lower extremities   • Generalized osteoarthritis   • Pulmonary hypertension (CMS/[facility])   • High risk medication use        Past Medical History:   Diagnosis Date   • Arthritis    • Hypertension         Past Surgical History:   Procedure Laterality Date   • CHOLECYSTECTOMY         Visit Dx:    ICD-10-CM ICD-9-CM   1. Lymphedema  I89.0 457.1   2. Venous insufficiency  I87.2 459.81       Patient History     Row Name 21 1500 21 1313          History    Chief Complaint  Swelling;Pain;Difficulty Walking  -PC  Balance Problems;Difficulty Walking;Dizziness;Falls/history of falls;Joint stiffness;Muscle weakness;Pain;Problems breathing/shortness of breath;Swelling;Ulcer, wound or other skin conditions  (Pended)   (Patient-Rptd)   -patient     Type of Pain  Ankle pain;Back pain  -PC  Ankle pain;Lower Extremity / Leg  (Pended)   (Patient-Rptd)   -patient     Date Current Problem(s) Began  -- 6 mos ago  -PC  --     Brief Description of Current Complaint  States she was treated here about 5 yrs ago but she didn't have all this swelling, only problems with her skin.  -PC  legs swelling and ankle pain  (Pended)   (Patient-Rptd)   -patient   "  Problem: Suicidal Behavior  Goal: Suicidal Behavior is Absent or Managed  Outcome: Met   Goal Outcome Evaluation:  Pt pleasant and appropriate thru the day. \"I'm just waiting for discharge.\"  No SI.                       "    Patient/Caregiver Goals  Relieve pain;Improve mobility;Know what to do to help the symptoms;Decrease swelling  -PC  Relieve pain;Improve mobility;Know what to do to help the symptoms;Decrease swelling;Heal wound  (Pended)   (Patient-Rptd)   -patient     Hand Dominance  --  right-handed  (Pended)   (Patient-Rptd)   -patient     Patient seeing anyone else for problem(s)?  --  yes  (Pended)   (Patient-Rptd)   -patient     How has patient tried to help current problem?  wrapping, elevation  -PC  --     Are you or can you be pregnant  --  No  (Pended)   (Patient-Rptd)   -patient        Pain     Pain Location  Ankle  -PC  --     Pain at Present  0  -PC  --     Pain at Best  0  -PC  --     Pain at Worst  8  -PC  --     Pain Frequency  Intermittent  -PC  --     What Performance Factors Make the Current Problem(s) WORSE?  walking  -PC  --     What Performance Factors Make the Current Problem(s) BETTER?  elevation, rest, wrapping  -PC  --     Difficulties with ADL's?  can't go down to basement to do laundry, unable to drive, difficulty fitting into socks and shoes  -PC  --        Fall Risk Assessment    Any falls in the past year:  No  -PC  No  (Pended)   (Patient-Rptd)   -patient        Services    Prior Rehab/Home Health Experiences  No  -PC  No  (Pended)   (Patient-Rptd)   -patient     Are you currently receiving Home Health services  No  -PC  No  (Pended)   (Patient-Rptd)   -patient     Do you plan to receive Home Health services in the near future  No  -PC  No  (Pended)   (Patient-Rptd)   -patient        Daily Activities    Primary Language  English  -PC  English  (Pended)   (Patient-Rptd)   -patient     Are you able to read  Yes has macular degeneration  -PC  Other (comment)  (Pended)   (Patient-Rptd)   -patient     Are you able to write  Yes  -PC  Other (comment)  (Pended)   (Patient-Rptd)   -patient     How does patient learn best?  Listening;Demonstration  -PC  Listening;Demonstration  (Pended)   (Patient-Rptd)    -patient     Teaching needs identified  Management of Condition  -PC  --     Patient is concerned about/has problems with  Climbing Stairs;Performing home management (household chores, shopping, care of dependents);Standing;Walking  -PC  --     Does patient have problems with the following?  Anxiety  -PC  --     Barriers to learning  None  -PC  --     Pt Participated in POC and Goals  Yes  -PC  --        Safety    Are you being hurt, hit, or frightened by anyone at home or in your life?  No  -PC  No  (Pended)   (Patient-Rptd)   -patient     Are you being neglected by a caregiver  No  -PC  No  (Pended)   (Patient-Rptd)   -patient     Have you had any of the following issues with  Anxiety  -PC  Anxiety  (Pended)   (Patient-Rptd)   -patient       User Key  (r) = Recorded By, (t) = Taken By, (c) = Cosigned By    Initials Name Provider Type    PC Mahi Holland, PT Physical Therapist    patient Tamara Singh --          Lymphedema     Row Name 07/05/21 1500             Subjective Pain    Able to rate subjective pain?  yes  -PC      Pre-Treatment Pain Level  0  -PC      Post-Treatment Pain Level  0  -PC         Subjective Comments    Subjective Comments  States she has lost a lot of weight, and thinks it has helped her legs. States she ordered velcro compression wraps when she was treated here previously, but she never liked them, so her brother wrapped her legs for a long time. Now he has back trouble and can no longer wrap her legs.   -PC         Lymphedema Assessment    Lymphedema Classification  RLE:;LLE:;stage 2 (Spontaneously Irreversible)  -PC      Infections or Cellulitis?  no  -PC         Physical Concerns    The amount of pain associated with my lymphedema is:  4  -PC      The amount of limb heaviness associated with my lymphedema is:  2  -PC      The amount of skin tightness associated with my lymphedema is:  0  -PC      The size of my swollen limb(s) seems:  2  -PC      Lymphedema affects the movement of my  "swollen limb(s):  2  -PC      The strength in my swollen limb(s) is:  2  -PC         Psychosocial Concerns    Lymphedema affects my body image (i.e., \"how I think I look\").  4  -PC      Lymphedema affects my socializing with others.  0  -PC      Lymphedema affects my intimate relations with spouse or partner (rate 0 if not applicable  0  -PC      Lymphedema \"gets me down\" (i.e., depression, frustration, or anger)  0  -PC      I must rely on others for help due to my lymphedema.  4  -PC      I know what to do to manage my lymphedema  2  -PC         Functional Concerns    Lymphedema affects my ability to perform self-care activities (i.e. eating, dressing, hygiene)  0  -PC      Lymphedema affects my ability to perform routine home or work-related activities.  2  -PC      Lymphedema affects my performance of preferred leisure activities.  4  -PC      Lymphedema affects proper fit of clothing/shoes  2  -PC      Lymphedema affects my sleep  0  -PC         Posture/Observations    Posture/Observations Comments  Pt ambulates into clinic with cane.   -PC         General ROM    GENERAL ROM COMMENTS  LE's WFL  -PC         Lymphedema Edema Assessment    Ptting Edema Category  By grade out of 4  -PC      Pitting Edema  + 3/4 feet and ankles  -PC      [place]mer Sign  bilateral:;positive  -PC      [place] Hump  bilateral:;positive  -PC      Edema Assessment Comment  Mod edema B feet to knees.   -PC         Skin Changes/Observations    Skin Observations Comment  Large circular area of redness ant lower legs  -PC         Lymphedema Sensation    Lymphedema Sensation Reports  RLE:;LLE:;normal  -PC         Lymphedema Measurements    Measurement Type(s)  Circumferential  -PC      Circumferential Areas  Lower extremities  -PC         BLE Circumferential (cm)    Measurement Location 1  Knee  -PC      Left 1  50.5 cm  -PC      Right 1  53 cm  -PC      Measurement Location 2  10cm below knee  -PC      Left 2  52.5 cm  -PC      Right 2  50.5 " cm  -PC      Measurement Location 3  20cm below knee  -PC      Left 3  46.5 cm  -PC      Right 3  40.2 cm  -PC      Measurement Location 4  30cm below knee  -PC      Left 4  33 cm  -PC      Right 4  32 cm  -PC      Measurement Location 5  Ankle  -PC      Left 5  31 cm  -PC      Right 5  31.5 cm  -PC      Measurement Location 6  Midfoot  -PC      Left 6  24.5 cm  -PC      Right 6  24 cm  -PC      LLE Circumferential Total  238 cm  -PC      RLE Circumferential Total  231.2 cm  -PC         Lymphedema Life Impact Scale Totals    A.  Total Q1 - Q17 (Do not include Q18)  30  -PC      B.  Total number of questions answered (Q1-Q17)  17  -PC      C. Divide A by B  1.76  -PC      D. Multiple C by 25  44  -PC        User Key  (r) = Recorded By, (t) = Taken By, (c) = Cosigned By    Initials Name Provider Type    PC Mahi Holland, PT Physical Therapist                          Therapy Education  Education Details: Reviewed lymphedema treatment program and plan of care.  Given: Edema management, Symptoms/condition management  Program: New  How Provided: Verbal  Provided to: Patient  Level of Understanding: Verbalized      OP Exercises     Row Name 07/05/21 1500             Subjective Comments    Subjective Comments  States she has lost a lot of weight, and thinks it has helped her legs. States she ordered velcro compression wraps when she was treated here previously, but she never liked them, so her brother wrapped her legs for a long time. Now he has back trouble and can no longer wrap her legs.   -PC         Subjective Pain    Able to rate subjective pain?  yes  -PC      Pre-Treatment Pain Level  0  -PC      Post-Treatment Pain Level  0  -PC        User Key  (r) = Recorded By, (t) = Taken By, (c) = Cosigned By    Initials Name Provider Type    PC Mahi Holland, PT Physical Therapist                      PT OP Goals     Row Name 07/05/21 1600          PT Short Term Goals    STG Date to Achieve  07/19/21  -PC     STG 1   Pt/family independent and compliant with self-wrapping techniques of compression bandages for improved self management of condition.  -PC     STG 1 Progress  New  -PC     STG 2  Pt will demo decreased net edema of BLE's >/= 5-10cm for decrease in edema, symptoms, decreased risk of infecftion, and improved skin care/ transition to self care of condition.  -PC     STG 2 Progress  New  -PC        Long Term Goals    LTG Date to Achieve  08/05/21  -PC     LTG 1  Pt will demo proper awareness of condition and precautions for improved prevention, management, care of symptoms.  -PC     LTG 1 Progress  New  -PC     LTG 2  Pt will demo decreased net edema of BLE's >/= 11-20cm for decrease in edema, symptoms, decreased risk of infection, and improved skin care/ transition to self-care of condition.  -PC     LTG 2 Progress  New  -PC     LTG 3  Pt/caregiver independent and compliant with use and careof compression garment or velcro device as needed to promote self-care independence.  -PC     LTG 3 Progress  New  -PC        Time Calculation    PT Goal Re-Cert Due Date  10/05/21  -PC       User Key  (r) = Recorded By, (t) = Taken By, (c) = Cosigned By    Initials Name Provider Type    Mahi Isabel, PT Physical Therapist          PT Assessment/Plan     Row Name 07/05/21 1934          PT Assessment    Functional Limitations  Limitation in home management;Performance in leisure activities;Performance in self-care ADL  -PC     Impairments  Edema;Gait;Impaired lymphatic circulation;Impaired venous circulation;Pain  -PC     Assessment Comments  Pt is an 81 yr old female who presents with mod edema B feet to knees. She has 3+ pitting on her feet and ankles, tenderness to palpation, dry skin, and large areas of redness on both anterior lower legs. She has difficulty walking and fitting into socks and shoes. She was treated here about 5 yrs ago and was fitted with velcro compression devices. She did not like them, however, and her  brother continued bandaging her legs instead.  This time she is hoping to fit into stockings.  SHe has lost a lot of weight, which has helped her legs. She understands the treatment process and is a good candidate.  -PC     Please refer to paper survey for additional self-reported information  Yes  -PC     Rehab Potential  Good  -PC     Patient/caregiver participated in establishment of treatment plan and goals  Yes  -PC     Patient would benefit from skilled therapy intervention  Yes  -PC        PT Plan    PT Frequency  3x/week  -PC     Predicted Duration of Therapy Intervention (PT)  4 weeks  -PC     Planned CPT's?  PT EVAL LOW COMPLEXITY: 30508;PT MANUAL THERAPY EA 15 MIN: 81522;PT SELF CARE/HOME MGMT/TRAIN EA 15: 48692  -PC     Physical Therapy Interventions (Optional Details)  bandaging;home exercise program;manual lymphatic drainage;patient/family education  -PC     PT Plan Comments  See pt for complete decongestive therapy per POC.  -PC       User Key  (r) = Recorded By, (t) = Taken By, (c) = Cosigned By    Initials Name Provider Type    PC Mahi Holland, PT Physical Therapist                       Time Calculation:   Start Time: 1530  Stop Time: 1625  Time Calculation (min): 55 min  Untimed Charges  PT Eval/Re-eval Minutes: 55  Total Minutes  Untimed Charges Total Minutes: 55   Total Minutes: 55   Therapy Charges for Today     Code Description Service Date Service Provider Modifiers Qty    48573790516 HC PT EVAL LOW COMPLEXITY 4 7/5/2021 Mahi Holland, PT GP 1                    Mahi Holland, PT  7/5/2021

## 2022-05-24 ENCOUNTER — CLINICAL SUPPORT (OUTPATIENT)
Dept: FAMILY MEDICINE CLINIC | Facility: CLINIC | Age: 82
End: 2022-05-24

## 2022-05-24 ENCOUNTER — TELEPHONE (OUTPATIENT)
Dept: FAMILY MEDICINE CLINIC | Facility: CLINIC | Age: 82
End: 2022-05-24

## 2022-05-24 DIAGNOSIS — R30.0 DYSURIA: Primary | ICD-10-CM

## 2022-05-24 LAB
BILIRUB BLD-MCNC: NEGATIVE MG/DL
CLARITY, POC: CLEAR
COLOR UR: YELLOW
EXPIRATION DATE: ABNORMAL
GLUCOSE UR STRIP-MCNC: NEGATIVE MG/DL
KETONES UR QL: NEGATIVE
LEUKOCYTE EST, POC: ABNORMAL
Lab: ABNORMAL
NITRITE UR-MCNC: NEGATIVE MG/ML
PH UR: 6.5 [PH] (ref 5–8)
PROT UR STRIP-MCNC: NEGATIVE MG/DL
RBC # UR STRIP: NEGATIVE /UL
SP GR UR: 1.01 (ref 1–1.03)
UROBILINOGEN UR QL: NORMAL

## 2022-05-24 PROCEDURE — 81003 URINALYSIS AUTO W/O SCOPE: CPT | Performed by: FAMILY MEDICINE

## 2022-05-26 LAB
BACTERIA UR CULT: NO GROWTH
BACTERIA UR CULT: NORMAL

## 2022-06-02 DIAGNOSIS — G89.29 CHRONIC LEFT-SIDED LOW BACK PAIN WITH LEFT-SIDED SCIATICA: ICD-10-CM

## 2022-06-02 DIAGNOSIS — M54.42 CHRONIC LEFT-SIDED LOW BACK PAIN WITH LEFT-SIDED SCIATICA: ICD-10-CM

## 2022-06-02 RX ORDER — HYDROCODONE BITARTRATE AND ACETAMINOPHEN 5; 325 MG/1; MG/1
1 TABLET ORAL EVERY 4 HOURS PRN
Qty: 120 TABLET | Refills: 0 | Status: SHIPPED | OUTPATIENT
Start: 2022-06-02 | End: 2023-03-08

## 2022-06-02 NOTE — TELEPHONE ENCOUNTER
.  NEW PHARMACY LISTED BELOW    Caller: SinghTamara    Relationship: Self    Best call back number: [phone]    Requested Prescriptions:   Requested Prescriptions     Pending Prescriptions Disp Refills   • HYDROcodone-acetaminophen (NORCO) 5-325 MG per tablet 120 tablet 0     Sig: Take 1 tablet by mouth Every 4 (Four) Hours As Needed for Moderate Pain  or Severe Pain .        Pharmacy where request should be sent: [id number] - [place], [place] - [address] AT [place] [facility] [phone] [facility] [phone]      Additional details provided by patient: 4 DAYS LEFT/NEW PHARMACY    Does the patient have less than a 3 day supply:  [] Yes  [x] No    Estela Russell Rep   06/02/22 09:04 EDT

## 2022-06-02 NOTE — TELEPHONE ENCOUNTER
Rx Refill Note  Requested Prescriptions     Pending Prescriptions Disp Refills   • HYDROcodone-acetaminophen (NORCO) 5-325 MG per tablet 120 tablet 0     Sig: Take 1 tablet by mouth Every 4 (Four) Hours As Needed for Moderate Pain  or Severe Pain .      Last office visit with prescribing clinician: 4/22/2022      Next office visit with prescribing clinician: 6/17/2022            Gemini Majano MA  06/02/22, 09:18 EDT   25-Jun-2019 19:03

## 2022-06-17 ENCOUNTER — OFFICE VISIT (OUTPATIENT)
Dept: FAMILY MEDICINE CLINIC | Facility: CLINIC | Age: 82
End: 2022-06-17

## 2022-06-17 VITALS
HEART RATE: 59 BPM | DIASTOLIC BLOOD PRESSURE: 70 MMHG | BODY MASS INDEX: 40.23 KG/M2 | TEMPERATURE: 96.8 F | OXYGEN SATURATION: 96 % | RESPIRATION RATE: 20 BRPM | WEIGHT: 218.6 LBS | HEIGHT: 62 IN | SYSTOLIC BLOOD PRESSURE: 122 MMHG

## 2022-06-17 DIAGNOSIS — M54.42 CHRONIC LEFT-SIDED LOW BACK PAIN WITH LEFT-SIDED SCIATICA: ICD-10-CM

## 2022-06-17 DIAGNOSIS — F41.9 ANXIETY: ICD-10-CM

## 2022-06-17 DIAGNOSIS — I10 ESSENTIAL HYPERTENSION: ICD-10-CM

## 2022-06-17 DIAGNOSIS — D75.1 SECONDARY POLYCYTHEMIA: ICD-10-CM

## 2022-06-17 DIAGNOSIS — G89.29 CHRONIC LEFT-SIDED LOW BACK PAIN WITH LEFT-SIDED SCIATICA: ICD-10-CM

## 2022-06-17 DIAGNOSIS — M54.16 LUMBAR RADICULOPATHY: Primary | ICD-10-CM

## 2022-06-17 DIAGNOSIS — I89.0 LYMPHEDEMA: ICD-10-CM

## 2022-06-17 DIAGNOSIS — I48.11 LONGSTANDING PERSISTENT ATRIAL FIBRILLATION: ICD-10-CM

## 2022-06-17 DIAGNOSIS — Z79.899 HIGH RISK MEDICATION USE: ICD-10-CM

## 2022-06-17 DIAGNOSIS — I27.20 PULMONARY HYPERTENSION, UNSPECIFIED: ICD-10-CM

## 2022-06-17 DIAGNOSIS — Q27.30 ARTERIOVENOUS MALFORMATION: ICD-10-CM

## 2022-06-17 PROCEDURE — 99214 OFFICE O/P EST MOD 30 MIN: CPT | Performed by: FAMILY MEDICINE

## 2022-06-17 RX ORDER — METHYLPREDNISOLONE 4 MG/1
TABLET ORAL
Qty: 21 TABLET | Refills: 0 | Status: SHIPPED | OUTPATIENT
Start: 2022-06-17 | End: 2022-09-16

## 2022-06-17 RX ORDER — CLONAZEPAM 0.5 MG/1
0.5 TABLET ORAL NIGHTLY PRN
Qty: 60 TABLET | Refills: 2 | Status: SHIPPED | OUTPATIENT
Start: 2022-06-17 | End: 2023-01-13

## 2022-06-17 RX ORDER — AMLODIPINE BESYLATE AND BENAZEPRIL HYDROCHLORIDE 10; 40 MG/1; MG/1
1 CAPSULE ORAL DAILY
Qty: 90 CAPSULE | Refills: 1 | Status: SHIPPED | OUTPATIENT
Start: 2022-06-17 | End: 2023-02-28 | Stop reason: SDUPTHER

## 2022-06-17 RX ORDER — CARISOPRODOL 350 MG/1
350 TABLET ORAL 4 TIMES DAILY PRN
Qty: 120 TABLET | Refills: 2 | Status: SHIPPED | OUTPATIENT
Start: 2022-06-17 | End: 2023-03-08

## 2022-06-17 RX ORDER — FUROSEMIDE 40 MG/1
40 TABLET ORAL DAILY
Qty: 90 TABLET | Refills: 1 | Status: SHIPPED | OUTPATIENT
Start: 2022-06-17

## 2022-06-17 NOTE — PROGRESS NOTES
HPI  Tamara Singh is a 82 y.o. female who is here for follow up of multiple ongoing medical issues.  Main complaint is back pain going down the leg and this was discussed.  In view of age and other risk factors treatment options are very limited.  Patient would like to try a Medrol Dosepak.  Risks versus benefits discussed.  Has appointment with new primary care provider next month.  Will renew medications pending transfer to new physician.      Review of Systems   Musculoskeletal: Positive for arthralgias, back pain and gait problem.   All other systems reviewed and are negative.        Past Medical History:   Diagnosis Date   • Abnormal ECG 6/05/21   • Anxiety    • Arthritis    • Asthma     Used to use inhaler but dont anymore.   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   • Coronary artery disease    • Hypertension    • Macular degeneration    • Pulmonary hypertension (HCC)    • Pulmonary hypertension (HCC)    • Sleep apnea        Past Surgical History:   Procedure Laterality Date   • CHOLECYSTECTOMY  1974       Family History   Problem Relation Age of Onset   • Heart attack Mother 86   • Stroke Mother    • Heart disease Mother 80   • Hypertension Mother    • Heart failure Mother    • Heart attack Brother 70   • Asthma Brother    • Hyperlipidemia Brother    • Thyroid disease Brother    • Heart failure Father    • Kidney disease Sister         Stage 5 on dialysis       Social History     Socioeconomic History   • Marital status: Single   Tobacco Use   • Smoking status: Never Smoker   • Smokeless tobacco: Never Used   Vaping Use   • Vaping Use: Never used   Substance and Sexual Activity   • Alcohol use: Never   • Drug use: No     Types: Hydrocodone   • Sexual activity: Never       Vitals:    06/17/22 1352   BP: 122/70   Pulse: 59   Resp: 20   Temp: 96.8 °F (36 °C)   SpO2: 96%        Body mass index is 39.98 kg/m².      Physical Exam  Vitals and nursing note reviewed.   Constitutional:        General: She is not in acute distress.     Appearance: She is well-developed.   HENT:      Head: Normocephalic and atraumatic.      Nose:      Comments: Patient with mask.  Provider with mask and shield  Eyes:      Conjunctiva/sclera: Conjunctivae normal.      Pupils: Pupils are equal, round, and reactive to light.   Neck:      Thyroid: No thyromegaly.   Cardiovascular:      Rate and Rhythm: Normal rate and regular rhythm.      Heart sounds: Normal heart sounds.   Pulmonary:      Effort: Pulmonary effort is normal. No respiratory distress.      Breath sounds: Normal breath sounds.   Abdominal:      General: There is no distension.      Palpations: Abdomen is soft. There is no mass.      Tenderness: There is no abdominal tenderness.      Hernia: No hernia is present.   Musculoskeletal:         General: Deformity present. No tenderness.      Cervical back: Normal range of motion.      Right lower leg: Edema present.      Left lower leg: Edema present.      Comments: Ambulates with a cane   Lymphadenopathy:      Cervical: No cervical adenopathy.   Skin:     General: Skin is warm and dry.      Coloration: Skin is not pale.      Findings: No rash.   Neurological:      General: No focal deficit present.      Mental Status: She is alert and oriented to person, place, and time.      Motor: No abnormal muscle tone.      Coordination: Coordination normal.   Psychiatric:         Mood and Affect: Mood normal.         Behavior: Behavior normal.         Thought Content: Thought content normal.         Judgment: Judgment normal.           Assessment/Plan    Diagnoses and all orders for this visit:    1. Lumbar radiculopathy (Primary)  -     methylPREDNISolone (Medrol) 4 MG dose pack; follow package directions  Dispense: 21 tablet; Refill: 0    2. Essential hypertension  -     amLODIPine-benazepril (LOTREL) 10-40 MG per capsule; Take 1 capsule by mouth Daily.  Dispense: 90 capsule; Refill: 1    3. Chronic left-sided low back  pain with left-sided sciatica  -     carisoprodol (SOMA) 350 MG tablet; Take 1 tablet by mouth 4 (Four) Times a Day As Needed for Muscle Spasms.  Dispense: 120 tablet; Refill: 2    4. Anxiety  -     clonazePAM (KlonoPIN) 0.5 MG tablet; Take 1 tablet by mouth At Night As Needed for Anxiety.  Dispense: 60 tablet; Refill: 2    5. Pulmonary hypertension, unspecified (HCC)  -     furosemide (Lasix) 40 MG tablet; Take 1 tablet by mouth Daily.  Dispense: 90 tablet; Refill: 1    6. High risk medication use    7. Arteriovenous malformation    8. Longstanding persistent atrial fibrillation (HCC)    9. Secondary polycythemia    10. Lymphedema        Patient here for follow-up of multiple medical issues.  Main complaint is back pain going down the leg and would like to try Medrol Dosepak.  Apparently has gotten some relief with prednisone in the past.  Does have appointment scheduled with new primary care physician next month.  Prescriptions refilled as above pending transfer to new physician.

## 2022-06-20 ENCOUNTER — TELEPHONE (OUTPATIENT)
Dept: FAMILY MEDICINE CLINIC | Facility: CLINIC | Age: 82
End: 2022-06-20

## 2022-06-20 NOTE — TELEPHONE ENCOUNTER
Caller: Tamara Singh    Relationship: Self    Best call back number: [phone]    What form or medical record are you requesting:  VISIT SUMMARY PRINT OUT FROM OFFICE VISIT 6-17-22.     Who is requesting this form or medical record from you:     How would you like to receive the form or medical records (pick-up, mail, fax):     If mail, what is the address:   [address] [place], [place] [address]      Timeframe paperwork needed:     Additional notes: PLEASE MAIL AFTER VISIT SUMMARY

## 2022-06-30 DIAGNOSIS — L97.221 VENOUS STASIS ULCER OF LEFT CALF LIMITED TO BREAKDOWN OF SKIN WITHOUT VARICOSE VEINS: ICD-10-CM

## 2022-06-30 DIAGNOSIS — I87.2 VENOUS STASIS ULCER OF LEFT CALF LIMITED TO BREAKDOWN OF SKIN WITHOUT VARICOSE VEINS: ICD-10-CM

## 2022-06-30 NOTE — TELEPHONE ENCOUNTER
Caller: Tamara Singh    Relationship: Self    Best call back number:   [phone]    Requested Prescriptions:   Requested Prescriptions     Pending Prescriptions Disp Refills   • silver sulfadiazine (SILVADENE, SSD) 1 % cream 85 g 2     Sig: Apply  topically to the appropriate area as directed 2 (Two) Times a Day. *CM:I87.2        Pharmacy where request should be sent: [id number] - [place], [place] - [address] AT [place] [facility] [phone] [facility] [phone]      Additional details provided by patient: PATIENT HAS A 2 WEEK SUPPLY     Does the patient have less than a 3 day supply:  [] Yes  [x] No    Estela DAY Rep   06/30/22 10:45 EDT

## 2022-06-30 NOTE — TELEPHONE ENCOUNTER
Rx Refill Note  Requested Prescriptions     Pending Prescriptions Disp Refills   • silver sulfadiazine (SILVADENE, SSD) 1 % cream 85 g 2     Sig: Apply  topically to the appropriate area as directed 2 (Two) Times a Day. *CM:I87.2      Last office visit with prescribing clinician: 6/17/2022      Next office visit with prescribing clinician: Visit date not found            Gemini Majano MA  06/30/22, 10:59 EDT

## 2022-08-31 ENCOUNTER — HOSPITAL ENCOUNTER (OUTPATIENT)
Dept: CT IMAGING | Facility: HOSPITAL | Age: 82
Discharge: HOME | End: 2022-08-31
Admitting: NURSE PRACTITIONER

## 2022-08-31 DIAGNOSIS — Q27.30 ARTERIOVENOUS MALFORMATION: ICD-10-CM

## 2022-08-31 LAB — CREAT BLDA-MCNC: 1.1 MG/DL (ref 0.6–1.3)

## 2022-08-31 PROCEDURE — 25010000002 IOPAMIDOL 61 % SOLUTION: Performed by: NURSE PRACTITIONER

## 2022-08-31 PROCEDURE — 71260 CT THORAX DX C+: CPT

## 2022-08-31 PROCEDURE — 82565 ASSAY OF CREATININE: CPT

## 2022-08-31 RX ADMIN — IOPAMIDOL 75 ML: 612 INJECTION, SOLUTION INTRAVENOUS at 10:33

## 2022-09-13 NOTE — PROGRESS NOTES
"Chief Complaint  Arteriovenous malformation follow-up    Subjective        Tamara Singh presents to [facility] for continued follow-up and surveillance.    History of Present Illness     Ms. Singh is a pleasant 81-year-old female who presents with continued surveillance of an arteriovenous malformation which was first identified on a CT of the chest performed in 2014.  Given the location of the AVM, Dr. Sy discussed routine surveillance with the patient versus surgery which would proved to be a riskier procedure.  Patient has elected to continue with surveillance and presents to our office today with a repeat CT of the chest. We have continued to follow her with surveillance.  The patient reports she has been doing well.  She has had no fever, chills, cough or hemoptysis.  She is accompanied by her sister today.    Objective   Vital Signs:  /62 (BP Location: Right arm, Patient Position: Sitting, Cuff Size: Adult)   Pulse 76   Ht 157.5 cm (62.01\")   Wt 99.2 kg (218 lb 11.1 oz)   SpO2 98%   BMI 39.99 kg/m²   Estimated body mass index is 39.99 kg/m² as calculated from the following:    Height as of this encounter: 157.5 cm (62.01\").    Weight as of this encounter: 99.2 kg (218 lb 11.1 oz).      Physical Exam  Vitals and nursing note reviewed.   Constitutional:       Appearance: Normal appearance. She is obese.   HENT:      Head: Normocephalic and atraumatic.   Eyes:      General: No scleral icterus.     Conjunctiva/sclera: Conjunctivae normal.   Cardiovascular:      Rate and Rhythm: Normal rate. Rhythm irregular.      Pulses: Normal pulses.      Heart sounds: Normal heart sounds.   Pulmonary:      Effort: Pulmonary effort is normal.      Breath sounds: Normal breath sounds. No wheezing, rhonchi or rales.   Chest:      Chest wall: No tenderness.   Abdominal:      General: Bowel sounds are normal.      Palpations: Abdomen is soft.   Musculoskeletal:      Right lower leg: " Edema present.      Left lower leg: Edema present.   Skin:     General: Skin is warm and dry.   Neurological:      General: No focal deficit present.      Mental Status: She is alert. Mental status is at baseline.   Psychiatric:         Mood and Affect: Mood normal.         Behavior: Behavior normal.         Thought Content: Thought content normal.        Result Review :  The following data was reviewed by: YISEL Toussaint on 09/14/2022:    Data reviewed: Radiologic studies CT of the chest performed on 8/31/2022 was independently reviewed.  There is stable appearance of the pulmonary AVM in the left lower lobe which measures 3 mm in dimension.  Stable bands of scarring in the lungs bilaterally.  No evidence of suspicious lung nodules or airspace consolidation.  No lymphadenopathy.  There is stable enlargement of the main PA which is nonspecific.  No pleural effusion.  Limited imaging of the upper abdomen is unremarkable.          Assessment and Plan   Diagnoses and all orders for this visit:    1. Arteriovenous malformation (Primary)  -     CT Chest With Contrast; Future    Ms. Singh has been doing well.  She has no pulmonary complaints today.  Her CT of the chest is stable with stable appearance of the pulmonary AVM in the left lower lobe as well as some bands of scarring seen bilaterally.  I recommend we continue surveillance annually with a repeat CT with contrast in 1 year and have her follow-up to see us at that time.  Of course, we are happy to see her sooner, should the need arise.  Thank you for allowing us to participate in the care of Tamara Singh.  We will continue to keep you informed of her progress.       I spent 21 minutes caring for Tamara on this date of service. This time includes time spent by me in the following activities:preparing for the visit, reviewing tests, obtaining and/or reviewing a separately obtained history, performing a medically appropriate examination and/or evaluation ,  counseling and educating the patient/family/caregiver, ordering medications, tests, or procedures, referring and communicating with other health care professionals , documenting information in the medical record, independently interpreting results and communicating that information with the patient/family/caregiver and care coordination  Follow Up   Return in about 1 year (around 9/14/2023) for Next scheduled follow up with CT chest.  Patient was given instructions and counseling regarding her condition or for health maintenance advice. Please see specific information pulled into the AVS if appropriate.

## 2022-09-14 ENCOUNTER — OFFICE VISIT (OUTPATIENT)
Dept: SURGERY | Facility: CLINIC | Age: 82
End: 2022-09-14

## 2022-09-14 VITALS
HEIGHT: 62 IN | SYSTOLIC BLOOD PRESSURE: 124 MMHG | BODY MASS INDEX: 40.25 KG/M2 | HEART RATE: 76 BPM | OXYGEN SATURATION: 98 % | WEIGHT: 218.7 LBS | DIASTOLIC BLOOD PRESSURE: 62 MMHG

## 2022-09-14 DIAGNOSIS — Q27.30 ARTERIOVENOUS MALFORMATION: Primary | ICD-10-CM

## 2022-09-14 PROCEDURE — 99213 OFFICE O/P EST LOW 20 MIN: CPT | Performed by: NURSE PRACTITIONER

## 2022-09-16 ENCOUNTER — OFFICE VISIT (OUTPATIENT)
Dept: INTERNAL MEDICINE | Age: 82
End: 2022-09-16

## 2022-09-16 VITALS
WEIGHT: 206 LBS | OXYGEN SATURATION: 97 % | HEART RATE: 75 BPM | DIASTOLIC BLOOD PRESSURE: 74 MMHG | HEIGHT: 62 IN | TEMPERATURE: 96.9 F | BODY MASS INDEX: 37.91 KG/M2 | SYSTOLIC BLOOD PRESSURE: 122 MMHG

## 2022-09-16 DIAGNOSIS — R73.09 ELEVATED GLUCOSE: ICD-10-CM

## 2022-09-16 DIAGNOSIS — M54.16 LUMBAR RADICULOPATHY: ICD-10-CM

## 2022-09-16 DIAGNOSIS — E78.5 HYPERLIPIDEMIA, UNSPECIFIED HYPERLIPIDEMIA TYPE: ICD-10-CM

## 2022-09-16 DIAGNOSIS — Z79.899 HIGH RISK MEDICATION USE: ICD-10-CM

## 2022-09-16 DIAGNOSIS — G47.00 INSOMNIA, UNSPECIFIED TYPE: ICD-10-CM

## 2022-09-16 DIAGNOSIS — Z76.89 ENCOUNTER TO ESTABLISH CARE: Primary | ICD-10-CM

## 2022-09-16 DIAGNOSIS — I10 PRIMARY HYPERTENSION: ICD-10-CM

## 2022-09-16 PROCEDURE — 99214 OFFICE O/P EST MOD 30 MIN: CPT

## 2022-09-16 NOTE — PROGRESS NOTES
"    I N T E R N A L  M E D I C I N E  Yane Daina, YISEL    ENCOUNTER DATE:  09/16/2022    Tamara Singh / 82 y.o. / female      CHIEF COMPLAINT / REASON FOR OFFICE VISIT     Establish Care and Hypertension      ASSESSMENT & PLAN     Diagnoses and all orders for this visit:    1. Encounter to establish care (Primary)    2. Primary hypertension  -     CBC & Differential  -     Comprehensive Metabolic Panel  -     TSH+Free T4  -     Urinalysis With Microscopic If Indicated (No Culture) - Urine, Clean Catch    3. Insomnia, unspecified type  -     Ambulatory Referral to Sleep Medicine    4. Lumbar radiculopathy  -     Ambulatory Referral to Pain Management    5. Elevated glucose  -     Hemoglobin A1c    6. Hyperlipidemia, unspecified hyperlipidemia type  -     Lipid Panel With / Chol / HDL Ratio    7. High risk medication use  -     Urine Drug Screen - Urine, Clean Catch    Other orders  -     Microscopic Examination -         SUMMARY/DISCUSSION  • Lengthy discussion had with pt about importance of titration off of clonazepam.  Pt agreeable and will work towards decreasing nightly use.  Will also refer for sleep study.  • Referral to pain management placed to pursue options for chronic lumbar back pain.  • Agreeable to update labs today.    • Pt declines referral to lymphedema clinic at today's appointment; will consider in the future.      Next Appointment with me: Visit date not found    Return in about 6 weeks (around 10/28/2022) for Medicare Wellness.      VITAL SIGNS     Visit Vitals  /74   Pulse 75   Temp 96.9 °F (36.1 °C)   Ht 157.5 cm (62.01\")   Wt 93.4 kg (206 lb)   LMP  (LMP Unknown)   SpO2 97%   BMI 37.67 kg/m²             Wt Readings from Last 3 Encounters:   09/16/22 93.4 kg (206 lb)   09/14/22 99.2 kg (218 lb 11.1 oz)   06/17/22 99.2 kg (218 lb 9.6 oz)     Body mass index is 37.67 kg/m².        MEDICATIONS AT THE TIME OF OFFICE VISIT     Current Outpatient Medications on File Prior to Visit "   Medication Sig Dispense Refill   • amLODIPine-benazepril (LOTREL) 10-40 MG per capsule Take 1 capsule by mouth Daily. 90 capsule 1   • carisoprodol (SOMA) 350 MG tablet Take 1 tablet by mouth 4 (Four) Times a Day As Needed for Muscle Spasms. 120 tablet 2   • clonazePAM (KlonoPIN) 0.5 MG tablet Take 1 tablet by mouth At Night As Needed for Anxiety. 60 tablet 2   • furosemide (Lasix) 40 MG tablet Take 1 tablet by mouth Daily. 90 tablet 1   • HYDROcodone-acetaminophen (NORCO) 5-325 MG per tablet Take 1 tablet by mouth Every 4 (Four) Hours As Needed for Moderate Pain  or Severe Pain . 120 tablet 0   • ketorolac (ACULAR) 0.5 % ophthalmic solution INSTILL 1 DROP INTO LEFT EYE 4 TIMES A DAY  3   • multivitamins-minerals (PRESERVISION AREDS 2) capsule capsule Take 1 capsule by mouth 2 (Two) Times a Day.     • PROAIR  (90 BASE) MCG/ACT inhaler INHALE 2 PUFFS BY MOUTH 4 TIMES DAILY AS NEEDED 1 inhaler 5   • rivaroxaban (Xarelto) 20 MG tablet Take 1 tablet by mouth Daily. 90 tablet 3   • silver sulfadiazine (SILVADENE, SSD) 1 % cream Apply  topically to the appropriate area as directed 2 (Two) Times a Day. *CM:I87.2 85 g 2   • Vitamin D, Cholecalciferol, 50 MCG (2000 UT) capsule Take 1 tablet by mouth Daily.       No current facility-administered medications on file prior to visit.        HISTORY OF PRESENT ILLNESS     Here to establish care.      HTN: Amlodipine-benazepril 10-40 mg daily.  Checks her BP intermittently at home and it averages 120s/70s.    Formerly prescribed by prior PCP Dr. Sauceda: Clonazepam 0.5 mg nightly for anxiety.  Norco 5-325 mg q 4 hours for chronic lumbar back pain.  Taking soma for muscle spasms.    MRI Lumbar spine in October 2021 shows diffuse lumbar spondylosis and foraminal narrowing.  Denies numbness, tingling, weakness, bowel/ bladder changes, saddle anesthsia.      Asthma: Very infrequent use of Proair inhaler.    On Lasix 40 mg for pulmonary HTN.  Followed annually by thoracic  surgery, Dr. Sy.      Afib: Taking Xarelto 20 mg.  Followed by cardiology, Dr. Miller, twice a year.      Chronic lymph edema.  She is not currently followed by lymph clinic.  She does have plans to establish with wound clinic this week.        Patient Care Team:  Yane Lama APRN as PCP - General (Family Medicine)  Dawson Sauceda MD as PCP - [facility]  Chris Sy III, MD as Surgeon (Thoracic Surgery)  Evens Miller III, MD as Consulting Physician (Cardiology)    REVIEW OF SYSTEMS     Review of Systems   Constitutional: Negative for chills, fever and unexpected weight change.   Respiratory: Negative for cough, chest tightness and shortness of breath.    Cardiovascular: Positive for leg swelling. Negative for chest pain and palpitations.   Neurological: Negative for dizziness, weakness, light-headedness and headaches.   Psychiatric/Behavioral: The patient is not nervous/anxious.           PHYSICAL EXAMINATION     Physical Exam  Vitals reviewed.   Constitutional:       General: She is not in acute distress.     Appearance: Normal appearance. She is not ill-appearing, toxic-appearing or diaphoretic.   HENT:      Head: Normocephalic and atraumatic.   Cardiovascular:      Rate and Rhythm: Normal rate and regular rhythm.      Heart sounds: Normal heart sounds.   Pulmonary:      Effort: Pulmonary effort is normal.      Breath sounds: Normal breath sounds.   Musculoskeletal:      Right lower leg: Edema present.      Left lower leg: Edema present.   Neurological:      Mental Status: She is alert and oriented to person, place, and time. Mental status is at baseline.   Psychiatric:         Mood and Affect: Mood normal.         Behavior: Behavior normal.         Thought Content: Thought content normal.         Judgment: Judgment normal.           REVIEWED DATA     Labs:           Imaging:            Medical Tests:           Summary of old records / correspondence / consultant report:           Request  outside records:

## 2022-09-17 LAB
ALBUMIN SERPL-MCNC: 4.9 G/DL (ref 3.5–5.2)
ALBUMIN/GLOB SERPL: 1.5 G/DL
ALP SERPL-CCNC: 129 U/L (ref 39–117)
ALT SERPL-CCNC: 28 U/L (ref 1–33)
AMPHETAMINES UR QL SCN: NEGATIVE NG/ML
APPEARANCE UR: CLEAR
AST SERPL-CCNC: 39 U/L (ref 1–32)
BACTERIA #/AREA URNS HPF: NORMAL /HPF
BARBITURATES UR QL SCN: NEGATIVE NG/ML
BASOPHILS # BLD AUTO: 0.01 10*3/MM3 (ref 0–0.2)
BASOPHILS NFR BLD AUTO: 0.2 % (ref 0–1.5)
BENZODIAZ UR QL SCN: NEGATIVE NG/ML
BILIRUB SERPL-MCNC: 0.6 MG/DL (ref 0–1.2)
BILIRUB UR QL STRIP: NEGATIVE
BUN SERPL-MCNC: 35 MG/DL (ref 8–23)
BUN/CREAT SERPL: 37.6 (ref 7–25)
BZE UR QL SCN: NEGATIVE NG/ML
CALCIUM SERPL-MCNC: 10 MG/DL (ref 8.6–10.5)
CANNABINOIDS UR QL SCN: NEGATIVE NG/ML
CASTS URNS MICRO: NORMAL
CHLORIDE SERPL-SCNC: 102 MMOL/L (ref 98–107)
CHOLEST SERPL-MCNC: 193 MG/DL (ref 0–200)
CHOLEST/HDLC SERPL: 1.39 {RATIO}
CO2 SERPL-SCNC: 25.8 MMOL/L (ref 22–29)
COLOR UR: YELLOW
CREAT SERPL-MCNC: 0.93 MG/DL (ref 0.57–1)
CREAT UR-MCNC: 42.9 MG/DL (ref 20–300)
EGFRCR SERPLBLD CKD-EPI 2021: 61.9 ML/MIN/1.73
EOSINOPHIL # BLD AUTO: 0.05 10*3/MM3 (ref 0–0.4)
EOSINOPHIL NFR BLD AUTO: 0.9 % (ref 0.3–6.2)
EPI CELLS #/AREA URNS HPF: NORMAL /HPF
ERYTHROCYTE [DISTWIDTH] IN BLOOD BY AUTOMATED COUNT: 15.3 % (ref 12.3–15.4)
GLOBULIN SER CALC-MCNC: 3.2 GM/DL
GLUCOSE SERPL-MCNC: 85 MG/DL (ref 65–99)
GLUCOSE UR QL STRIP: NEGATIVE
HBA1C MFR BLD: 4.8 % (ref 4.8–5.6)
HCT VFR BLD AUTO: 37.3 % (ref 34–46.6)
HDLC SERPL-MCNC: 139 MG/DL (ref 40–60)
HGB BLD-MCNC: 12.6 G/DL (ref 12–15.9)
HGB UR QL STRIP: NEGATIVE
IMM GRANULOCYTES # BLD AUTO: 0.01 10*3/MM3 (ref 0–0.05)
IMM GRANULOCYTES NFR BLD AUTO: 0.2 % (ref 0–0.5)
KETONES UR QL STRIP: NEGATIVE
LABORATORY COMMENT REPORT: ABNORMAL
LDLC SERPL CALC-MCNC: 43 MG/DL (ref 0–100)
LEUKOCYTE ESTERASE UR QL STRIP: ABNORMAL
LYMPHOCYTES # BLD AUTO: 1.57 10*3/MM3 (ref 0.7–3.1)
LYMPHOCYTES NFR BLD AUTO: 28.2 % (ref 19.6–45.3)
MCH RBC QN AUTO: 29.6 PG (ref 26.6–33)
MCHC RBC AUTO-ENTMCNC: 33.8 G/DL (ref 31.5–35.7)
MCV RBC AUTO: 87.8 FL (ref 79–97)
METHADONE UR QL SCN: NEGATIVE NG/ML
MONOCYTES # BLD AUTO: 0.41 10*3/MM3 (ref 0.1–0.9)
MONOCYTES NFR BLD AUTO: 7.4 % (ref 5–12)
NEUTROPHILS # BLD AUTO: 3.51 10*3/MM3 (ref 1.7–7)
NEUTROPHILS NFR BLD AUTO: 63.1 % (ref 42.7–76)
NITRITE UR QL STRIP: NEGATIVE
NRBC BLD AUTO-RTO: 0 /100 WBC (ref 0–0.2)
OPIATES UR QL SCN: POSITIVE NG/ML
OXYCODONE+OXYMORPHONE UR QL SCN: NEGATIVE NG/ML
PCP UR QL: NEGATIVE NG/ML
PH UR STRIP: 5.5 [PH] (ref 5–8)
PH UR: 4.9 [PH] (ref 4.5–8.9)
PLATELET # BLD AUTO: 238 10*3/MM3 (ref 140–450)
POTASSIUM SERPL-SCNC: 5.3 MMOL/L (ref 3.5–5.2)
PROPOXYPH UR QL SCN: NEGATIVE NG/ML
PROT SERPL-MCNC: 8.1 G/DL (ref 6–8.5)
PROT UR QL STRIP: NEGATIVE
RBC # BLD AUTO: 4.25 10*6/MM3 (ref 3.77–5.28)
RBC #/AREA URNS HPF: NORMAL /HPF
SODIUM SERPL-SCNC: 140 MMOL/L (ref 136–145)
SP GR UR STRIP: 1.01 (ref 1–1.03)
T4 FREE SERPL-MCNC: 1.17 NG/DL (ref 0.93–1.7)
TRIGL SERPL-MCNC: 60 MG/DL (ref 0–150)
TSH SERPL DL<=0.005 MIU/L-ACNC: 6.9 UIU/ML (ref 0.27–4.2)
UROBILINOGEN UR STRIP-MCNC: ABNORMAL MG/DL
VLDLC SERPL CALC-MCNC: 11 MG/DL (ref 5–40)
WBC # BLD AUTO: 5.56 10*3/MM3 (ref 3.4–10.8)
WBC #/AREA URNS HPF: NORMAL /HPF

## 2022-09-19 ENCOUNTER — TELEPHONE (OUTPATIENT)
Dept: INTERNAL MEDICINE | Age: 82
End: 2022-09-19

## 2022-09-19 DIAGNOSIS — E87.5 HYPERKALEMIA: Primary | ICD-10-CM

## 2022-09-26 ENCOUNTER — TELEPHONE (OUTPATIENT)
Dept: INTERNAL MEDICINE | Age: 82
End: 2022-09-26

## 2022-09-28 ENCOUNTER — TELEPHONE (OUTPATIENT)
Dept: INTERNAL MEDICINE | Age: 82
End: 2022-09-28

## 2022-09-28 NOTE — TELEPHONE ENCOUNTER
Recommend that she trial melatonin 1 mg nightly for sleep. May increase he dose up to 3 mg nightly if needed.    Has she successfully weaned herself off of the clonazepam?

## 2022-09-28 NOTE — TELEPHONE ENCOUNTER
Caller: Tamara Singh    Relationship: Self    Best call back number: [phone]    What medication are you requesting: FOR SLEEP     What are your current symptoms:     How long have you been experiencing symptoms:     Have you had these symptoms before:    [] Yes  [] No    Have you been treated for these symptoms before:   [] Yes  [] No    If a prescription is needed, what is your preferred pharmacy and phone number: [address] [place], [place] - [address] [facility] [phone] [facility] [phone]      Additional notes: SEEN IN OFFICE 9-16-22,WAS TAKEN OFF OF clonazePAM (KlonoPIN) 0.5 MG tablet.  PATIENT STATES COBY ADVISED SHE COULD PRESCRIBE SOMETHING TO HELP PATIENT SLEEP. PATIENT REQUESTS THAT MEDICATION, BUT STATES SHE WILL NOT TAKE ZOLOFT.   PLEASE ADVISE

## 2022-09-29 NOTE — TELEPHONE ENCOUNTER
Pt will try 5 mg of melatonin 3 mg hasn't worked in the past. She has weaned herself off clonazepam

## 2022-09-30 ENCOUNTER — APPOINTMENT (OUTPATIENT)
Dept: WOUND CARE | Facility: HOSPITAL | Age: 82
End: 2022-09-30

## 2023-01-13 ENCOUNTER — TELEMEDICINE (OUTPATIENT)
Dept: INTERNAL MEDICINE | Age: 83
End: 2023-01-13
Payer: MEDICARE

## 2023-01-13 DIAGNOSIS — F41.9 ANXIETY: Primary | ICD-10-CM

## 2023-01-13 DIAGNOSIS — G47.9 SLEEP DISTURBANCE: ICD-10-CM

## 2023-01-13 PROCEDURE — 99213 OFFICE O/P EST LOW 20 MIN: CPT

## 2023-01-13 RX ORDER — HYDROXYZINE HYDROCHLORIDE 10 MG/1
10 TABLET, FILM COATED ORAL NIGHTLY PRN
Qty: 30 TABLET | Refills: 0 | Status: SHIPPED | OUTPATIENT
Start: 2023-01-13 | End: 2023-03-08

## 2023-01-13 NOTE — PROGRESS NOTES
I N T E R N A L  M E D I C I N E  COBY FAY, APRN      ENCOUNTER DATE:  01/13/2023    Tamara GONZALEZ Singh / 82 y.o. / female        You have chosen to receive care through a telehealth visit.  Do you consent to use a video/audio connection for your medical care today? YES    Location of patient and provider are in [place]       CHIEF COMPLAINT / REASON FOR OFFICE VISIT     TELEHEALTH ENCOUNTER:  Insomnia (/)      ASSESSMENT & PLAN     Diagnoses and all orders for this visit:    1. Anxiety (Primary)  -     hydrOXYzine (ATARAX) 10 MG tablet; Take 1 tablet by mouth At Night As Needed for Anxiety.  Dispense: 30 tablet; Refill: 0    2. Sleep disturbance  -     hydrOXYzine (ATARAX) 10 MG tablet; Take 1 tablet by mouth At Night As Needed for Anxiety.  Dispense: 30 tablet; Refill: 0          SUMMARY/DISCUSSION  • Will trial hydroxyzine 10 mg as needed for anxiety.  She will provide an update on condition in 1 week.      Time spent: 15 minutes    Next Appointment with me: 6/23/2023    No follow-ups on file.        HISTORY OF PRESENT ILLNESS     Reports ongoing difficulty with staying sleep.  She denies any current difficulty initiating sleep, but is waking up after 3-4 hours with worrying thoughts.  She feels that her mind won't stop racing.  This has been an ongoing problem for her for many years.  Denies any increased nocturia.  She successfully stopped taking clonazepam 0.5 mg nightly in the last months.  She has tried melatonin 5 mg without benefit.        REVIEWED DATA     Labs:           Imaging:           Medical Tests:           Summary of old records / correspondence / consultant report:           Request outside records:         Template created by Yonatan Danielle MD

## 2023-02-28 DIAGNOSIS — I10 ESSENTIAL HYPERTENSION: ICD-10-CM

## 2023-03-01 RX ORDER — AMLODIPINE BESYLATE AND BENAZEPRIL HYDROCHLORIDE 10; 40 MG/1; MG/1
1 CAPSULE ORAL DAILY
Qty: 90 CAPSULE | Refills: 1 | Status: SHIPPED | OUTPATIENT
Start: 2023-03-01 | End: 2023-03-08

## 2023-03-03 ENCOUNTER — HOSPITAL ENCOUNTER (INPATIENT)
Facility: HOSPITAL | Age: 83
LOS: 4 days | Discharge: HOME HEALTH | DRG: 308 | End: 2023-03-08
Attending: EMERGENCY MEDICINE | Admitting: INTERNAL MEDICINE
Payer: MEDICARE

## 2023-03-03 ENCOUNTER — APPOINTMENT (OUTPATIENT)
Dept: GENERAL RADIOLOGY | Facility: HOSPITAL | Age: 83
DRG: 308 | End: 2023-03-03
Payer: MEDICARE

## 2023-03-03 DIAGNOSIS — N17.9 AKI (ACUTE KIDNEY INJURY): ICD-10-CM

## 2023-03-03 DIAGNOSIS — E87.5 HYPERKALEMIA: ICD-10-CM

## 2023-03-03 DIAGNOSIS — Z79.01 ANTICOAGULATED: ICD-10-CM

## 2023-03-03 DIAGNOSIS — Z86.79 HISTORY OF ATRIAL FIBRILLATION: ICD-10-CM

## 2023-03-03 DIAGNOSIS — I27.20 PULMONARY HYPERTENSION: ICD-10-CM

## 2023-03-03 DIAGNOSIS — T68.XXXA HYPOTHERMIA, INITIAL ENCOUNTER: ICD-10-CM

## 2023-03-03 DIAGNOSIS — R00.1 SYMPTOMATIC BRADYCARDIA: Primary | ICD-10-CM

## 2023-03-03 LAB
ALBUMIN SERPL-MCNC: 4.2 G/DL (ref 3.5–5.2)
ALBUMIN/GLOB SERPL: 1.1 G/DL
ALP SERPL-CCNC: 126 U/L (ref 39–117)
ALT SERPL W P-5'-P-CCNC: 42 U/L (ref 1–33)
ANION GAP SERPL CALCULATED.3IONS-SCNC: 14 MMOL/L (ref 5–15)
AST SERPL-CCNC: 40 U/L (ref 1–32)
BASOPHILS # BLD AUTO: 0.01 10*3/MM3 (ref 0–0.2)
BASOPHILS NFR BLD AUTO: 0.2 % (ref 0–1.5)
BILIRUB SERPL-MCNC: 0.5 MG/DL (ref 0–1.2)
BILIRUB UR QL STRIP: NEGATIVE
BUN SERPL-MCNC: 86 MG/DL (ref 8–23)
BUN/CREAT SERPL: 52.4 (ref 7–25)
CALCIUM SPEC-SCNC: 9.4 MG/DL (ref 8.6–10.5)
CHLORIDE SERPL-SCNC: 100 MMOL/L (ref 98–107)
CLARITY UR: CLEAR
CO2 SERPL-SCNC: 20 MMOL/L (ref 22–29)
COLOR UR: YELLOW
CREAT SERPL-MCNC: 1.64 MG/DL (ref 0.57–1)
DEPRECATED RDW RBC AUTO: 58.4 FL (ref 37–54)
EGFRCR SERPLBLD CKD-EPI 2021: 31.1 ML/MIN/1.73
EOSINOPHIL # BLD AUTO: 0.06 10*3/MM3 (ref 0–0.4)
EOSINOPHIL NFR BLD AUTO: 1.3 % (ref 0.3–6.2)
ERYTHROCYTE [DISTWIDTH] IN BLOOD BY AUTOMATED COUNT: 18.2 % (ref 12.3–15.4)
GLOBULIN UR ELPH-MCNC: 3.7 GM/DL
GLUCOSE SERPL-MCNC: 134 MG/DL (ref 65–99)
GLUCOSE UR STRIP-MCNC: NEGATIVE MG/DL
HCT VFR BLD AUTO: 33 % (ref 34–46.6)
HGB BLD-MCNC: 11.6 G/DL (ref 12–15.9)
HGB UR QL STRIP.AUTO: NEGATIVE
IMM GRANULOCYTES # BLD AUTO: 0.01 10*3/MM3 (ref 0–0.05)
IMM GRANULOCYTES NFR BLD AUTO: 0.2 % (ref 0–0.5)
INR PPP: 2.56 (ref 0.9–1.1)
KETONES UR QL STRIP: NEGATIVE
LEUKOCYTE ESTERASE UR QL STRIP.AUTO: NEGATIVE
LYMPHOCYTES # BLD AUTO: 2.15 10*3/MM3 (ref 0.7–3.1)
LYMPHOCYTES NFR BLD AUTO: 45.4 % (ref 19.6–45.3)
MAGNESIUM SERPL-MCNC: 2.7 MG/DL (ref 1.6–2.4)
MCH RBC QN AUTO: 30.9 PG (ref 26.6–33)
MCHC RBC AUTO-ENTMCNC: 35.2 G/DL (ref 31.5–35.7)
MCV RBC AUTO: 88 FL (ref 79–97)
MONOCYTES # BLD AUTO: 0.39 10*3/MM3 (ref 0.1–0.9)
MONOCYTES NFR BLD AUTO: 8.2 % (ref 5–12)
NEUTROPHILS NFR BLD AUTO: 2.12 10*3/MM3 (ref 1.7–7)
NEUTROPHILS NFR BLD AUTO: 44.7 % (ref 42.7–76)
NITRITE UR QL STRIP: NEGATIVE
NRBC BLD AUTO-RTO: 0 /100 WBC (ref 0–0.2)
NT-PROBNP SERPL-MCNC: 803 PG/ML (ref 0–1800)
PH UR STRIP.AUTO: <=5 [PH] (ref 5–8)
PLATELET # BLD AUTO: 223 10*3/MM3 (ref 140–450)
PMV BLD AUTO: 9.4 FL (ref 6–12)
POTASSIUM SERPL-SCNC: 6.1 MMOL/L (ref 3.5–5.2)
PROT SERPL-MCNC: 7.9 G/DL (ref 6–8.5)
PROT UR QL STRIP: NEGATIVE
PROTHROMBIN TIME: 27.8 SECONDS (ref 11.7–14.2)
RBC # BLD AUTO: 3.75 10*6/MM3 (ref 3.77–5.28)
SODIUM SERPL-SCNC: 134 MMOL/L (ref 136–145)
SP GR UR STRIP: 1.02 (ref 1–1.03)
TROPONIN T SERPL HS-MCNC: 36 NG/L
UROBILINOGEN UR QL STRIP: NORMAL
WBC NRBC COR # BLD: 4.74 10*3/MM3 (ref 3.4–10.8)

## 2023-03-03 PROCEDURE — 84484 ASSAY OF TROPONIN QUANT: CPT | Performed by: PHYSICIAN ASSISTANT

## 2023-03-03 PROCEDURE — 85610 PROTHROMBIN TIME: CPT | Performed by: PHYSICIAN ASSISTANT

## 2023-03-03 PROCEDURE — 84443 ASSAY THYROID STIM HORMONE: CPT | Performed by: PHYSICIAN ASSISTANT

## 2023-03-03 PROCEDURE — 80053 COMPREHEN METABOLIC PANEL: CPT | Performed by: PHYSICIAN ASSISTANT

## 2023-03-03 PROCEDURE — 93005 ELECTROCARDIOGRAM TRACING: CPT

## 2023-03-03 PROCEDURE — 93010 ELECTROCARDIOGRAM REPORT: CPT | Performed by: INTERNAL MEDICINE

## 2023-03-03 PROCEDURE — 83880 ASSAY OF NATRIURETIC PEPTIDE: CPT | Performed by: PHYSICIAN ASSISTANT

## 2023-03-03 PROCEDURE — 83735 ASSAY OF MAGNESIUM: CPT | Performed by: PHYSICIAN ASSISTANT

## 2023-03-03 PROCEDURE — P9612 CATHETERIZE FOR URINE SPEC: HCPCS

## 2023-03-03 PROCEDURE — 63710000001 INSULIN REGULAR HUMAN PER 5 UNITS: Performed by: PHYSICIAN ASSISTANT

## 2023-03-03 PROCEDURE — 71045 X-RAY EXAM CHEST 1 VIEW: CPT

## 2023-03-03 PROCEDURE — 85025 COMPLETE CBC W/AUTO DIFF WBC: CPT | Performed by: PHYSICIAN ASSISTANT

## 2023-03-03 PROCEDURE — 84439 ASSAY OF FREE THYROXINE: CPT | Performed by: PHYSICIAN ASSISTANT

## 2023-03-03 PROCEDURE — 81003 URINALYSIS AUTO W/O SCOPE: CPT | Performed by: PHYSICIAN ASSISTANT

## 2023-03-03 PROCEDURE — 93005 ELECTROCARDIOGRAM TRACING: CPT | Performed by: EMERGENCY MEDICINE

## 2023-03-03 PROCEDURE — 99285 EMERGENCY DEPT VISIT HI MDM: CPT

## 2023-03-03 RX ORDER — DEXTROSE MONOHYDRATE 25 G/50ML
50 INJECTION, SOLUTION INTRAVENOUS ONCE
Status: COMPLETED | OUTPATIENT
Start: 2023-03-03 | End: 2023-03-03

## 2023-03-03 RX ORDER — SODIUM CHLORIDE 0.9 % (FLUSH) 0.9 %
10 SYRINGE (ML) INJECTION AS NEEDED
Status: DISCONTINUED | OUTPATIENT
Start: 2023-03-03 | End: 2023-03-08 | Stop reason: HOSPADM

## 2023-03-03 RX ADMIN — SODIUM CHLORIDE 1000 ML: 9 INJECTION, SOLUTION INTRAVENOUS at 23:44

## 2023-03-03 RX ADMIN — DEXTROSE MONOHYDRATE 50 ML: 25 INJECTION, SOLUTION INTRAVENOUS at 23:44

## 2023-03-03 RX ADMIN — INSULIN HUMAN 10 UNITS: 100 INJECTION, SOLUTION PARENTERAL at 23:44

## 2023-03-03 RX ADMIN — SODIUM BICARBONATE 50 MEQ: 84 INJECTION, SOLUTION INTRAVENOUS at 23:44

## 2023-03-04 ENCOUNTER — APPOINTMENT (OUTPATIENT)
Dept: CARDIOLOGY | Facility: HOSPITAL | Age: 83
DRG: 308 | End: 2023-03-04
Payer: MEDICARE

## 2023-03-04 ENCOUNTER — APPOINTMENT (OUTPATIENT)
Dept: CT IMAGING | Facility: HOSPITAL | Age: 83
DRG: 308 | End: 2023-03-04
Payer: MEDICARE

## 2023-03-04 PROBLEM — R00.1 SYMPTOMATIC BRADYCARDIA: Status: ACTIVE | Noted: 2023-03-04

## 2023-03-04 LAB
ALBUMIN SERPL-MCNC: 4.1 G/DL (ref 3.5–5.2)
ANION GAP SERPL CALCULATED.3IONS-SCNC: 8.9 MMOL/L (ref 5–15)
ANION GAP SERPL CALCULATED.3IONS-SCNC: 9.9 MMOL/L (ref 5–15)
BASOPHILS # BLD AUTO: 0.01 10*3/MM3 (ref 0–0.2)
BASOPHILS # BLD AUTO: 0.01 10*3/MM3 (ref 0–0.2)
BASOPHILS NFR BLD AUTO: 0.2 % (ref 0–1.5)
BASOPHILS NFR BLD AUTO: 0.2 % (ref 0–1.5)
BUN SERPL-MCNC: 79 MG/DL (ref 8–23)
BUN SERPL-MCNC: 81 MG/DL (ref 8–23)
BUN/CREAT SERPL: 62.7 (ref 7–25)
BUN/CREAT SERPL: 66.4 (ref 7–25)
CALCIUM SPEC-SCNC: 9.1 MG/DL (ref 8.6–10.5)
CALCIUM SPEC-SCNC: 9.1 MG/DL (ref 8.6–10.5)
CHLORIDE SERPL-SCNC: 105 MMOL/L (ref 98–107)
CHLORIDE SERPL-SCNC: 106 MMOL/L (ref 98–107)
CO2 SERPL-SCNC: 22.1 MMOL/L (ref 22–29)
CO2 SERPL-SCNC: 22.1 MMOL/L (ref 22–29)
CREAT SERPL-MCNC: 1.22 MG/DL (ref 0.57–1)
CREAT SERPL-MCNC: 1.26 MG/DL (ref 0.57–1)
CREAT UR-MCNC: 34.8 MG/DL
D-LACTATE SERPL-SCNC: 1 MMOL/L (ref 0.5–2)
DEPRECATED RDW RBC AUTO: 59.8 FL (ref 37–54)
DEPRECATED RDW RBC AUTO: 61 FL (ref 37–54)
EGFRCR SERPLBLD CKD-EPI 2021: 42.7 ML/MIN/1.73
EGFRCR SERPLBLD CKD-EPI 2021: 44.4 ML/MIN/1.73
EOSINOPHIL # BLD AUTO: 0.02 10*3/MM3 (ref 0–0.4)
EOSINOPHIL # BLD AUTO: 0.02 10*3/MM3 (ref 0–0.4)
EOSINOPHIL NFR BLD AUTO: 0.4 % (ref 0.3–6.2)
EOSINOPHIL NFR BLD AUTO: 0.4 % (ref 0.3–6.2)
ERYTHROCYTE [DISTWIDTH] IN BLOOD BY AUTOMATED COUNT: 18.4 % (ref 12.3–15.4)
ERYTHROCYTE [DISTWIDTH] IN BLOOD BY AUTOMATED COUNT: 18.6 % (ref 12.3–15.4)
GEN 5 2HR TROPONIN T REFLEX: 33 NG/L
GLUCOSE BLDC GLUCOMTR-MCNC: 114 MG/DL (ref 70–130)
GLUCOSE BLDC GLUCOMTR-MCNC: 79 MG/DL (ref 70–130)
GLUCOSE BLDC GLUCOMTR-MCNC: 92 MG/DL (ref 70–130)
GLUCOSE BLDC GLUCOMTR-MCNC: 95 MG/DL (ref 70–130)
GLUCOSE SERPL-MCNC: 24 MG/DL (ref 65–99)
GLUCOSE SERPL-MCNC: 70 MG/DL (ref 65–99)
HCT VFR BLD AUTO: 31.4 % (ref 34–46.6)
HCT VFR BLD AUTO: 31.6 % (ref 34–46.6)
HGB BLD-MCNC: 10.6 G/DL (ref 12–15.9)
HGB BLD-MCNC: 10.7 G/DL (ref 12–15.9)
IMM GRANULOCYTES # BLD AUTO: 0.02 10*3/MM3 (ref 0–0.05)
IMM GRANULOCYTES # BLD AUTO: 0.03 10*3/MM3 (ref 0–0.05)
IMM GRANULOCYTES NFR BLD AUTO: 0.4 % (ref 0–0.5)
IMM GRANULOCYTES NFR BLD AUTO: 0.5 % (ref 0–0.5)
LYMPHOCYTES # BLD AUTO: 0.66 10*3/MM3 (ref 0.7–3.1)
LYMPHOCYTES # BLD AUTO: 1.19 10*3/MM3 (ref 0.7–3.1)
LYMPHOCYTES NFR BLD AUTO: 13.6 % (ref 19.6–45.3)
LYMPHOCYTES NFR BLD AUTO: 21.7 % (ref 19.6–45.3)
MAGNESIUM SERPL-MCNC: 2.4 MG/DL (ref 1.6–2.4)
MAGNESIUM SERPL-MCNC: 2.5 MG/DL (ref 1.6–2.4)
MCH RBC QN AUTO: 30.1 PG (ref 26.6–33)
MCH RBC QN AUTO: 30.6 PG (ref 26.6–33)
MCHC RBC AUTO-ENTMCNC: 33.8 G/DL (ref 31.5–35.7)
MCHC RBC AUTO-ENTMCNC: 33.9 G/DL (ref 31.5–35.7)
MCV RBC AUTO: 89.2 FL (ref 79–97)
MCV RBC AUTO: 90.3 FL (ref 79–97)
MONOCYTES # BLD AUTO: 0.4 10*3/MM3 (ref 0.1–0.9)
MONOCYTES # BLD AUTO: 0.49 10*3/MM3 (ref 0.1–0.9)
MONOCYTES NFR BLD AUTO: 8.2 % (ref 5–12)
MONOCYTES NFR BLD AUTO: 8.9 % (ref 5–12)
NEUTROPHILS NFR BLD AUTO: 3.75 10*3/MM3 (ref 1.7–7)
NEUTROPHILS NFR BLD AUTO: 3.75 10*3/MM3 (ref 1.7–7)
NEUTROPHILS NFR BLD AUTO: 68.3 % (ref 42.7–76)
NEUTROPHILS NFR BLD AUTO: 77.2 % (ref 42.7–76)
NRBC BLD AUTO-RTO: 0 /100 WBC (ref 0–0.2)
NRBC BLD AUTO-RTO: 0 /100 WBC (ref 0–0.2)
PHOSPHATE SERPL-MCNC: 4.2 MG/DL (ref 2.5–4.5)
PHOSPHATE SERPL-MCNC: 4.3 MG/DL (ref 2.5–4.5)
PLATELET # BLD AUTO: 184 10*3/MM3 (ref 140–450)
PLATELET # BLD AUTO: 199 10*3/MM3 (ref 140–450)
PMV BLD AUTO: 9.7 FL (ref 6–12)
PMV BLD AUTO: 9.8 FL (ref 6–12)
POTASSIUM SERPL-SCNC: 5.6 MMOL/L (ref 3.5–5.2)
POTASSIUM SERPL-SCNC: 5.8 MMOL/L (ref 3.5–5.2)
POTASSIUM SERPL-SCNC: 5.9 MMOL/L (ref 3.5–5.2)
PROCALCITONIN SERPL-MCNC: 0.09 NG/ML (ref 0–0.25)
PROCALCITONIN SERPL-MCNC: 0.11 NG/ML (ref 0–0.25)
QT INTERVAL: 426 MS
QT INTERVAL: 533 MS
QT INTERVAL: 544 MS
RBC # BLD AUTO: 3.5 10*6/MM3 (ref 3.77–5.28)
RBC # BLD AUTO: 3.52 10*6/MM3 (ref 3.77–5.28)
SODIUM SERPL-SCNC: 136 MMOL/L (ref 136–145)
SODIUM SERPL-SCNC: 138 MMOL/L (ref 136–145)
SODIUM UR-SCNC: 41 MMOL/L
T3FREE SERPL-MCNC: 1.87 PG/ML (ref 2–4.4)
T4 FREE SERPL-MCNC: 1.08 NG/DL (ref 0.93–1.7)
TROPONIN T DELTA: -3 NG/L
TSH SERPL DL<=0.05 MIU/L-ACNC: 7.46 UIU/ML (ref 0.27–4.2)
WBC NRBC COR # BLD: 4.86 10*3/MM3 (ref 3.4–10.8)
WBC NRBC COR # BLD: 5.49 10*3/MM3 (ref 3.4–10.8)

## 2023-03-04 PROCEDURE — 84481 FREE ASSAY (FT-3): CPT | Performed by: INTERNAL MEDICINE

## 2023-03-04 PROCEDURE — 82962 GLUCOSE BLOOD TEST: CPT

## 2023-03-04 PROCEDURE — 25010000002 CALCIUM GLUCONATE-NACL 1-0.675 GM/50ML-% SOLUTION: Performed by: INTERNAL MEDICINE

## 2023-03-04 PROCEDURE — 80048 BASIC METABOLIC PNL TOTAL CA: CPT

## 2023-03-04 PROCEDURE — 84300 ASSAY OF URINE SODIUM: CPT | Performed by: INTERNAL MEDICINE

## 2023-03-04 PROCEDURE — 82570 ASSAY OF URINE CREATININE: CPT | Performed by: INTERNAL MEDICINE

## 2023-03-04 PROCEDURE — 83605 ASSAY OF LACTIC ACID: CPT | Performed by: INTERNAL MEDICINE

## 2023-03-04 PROCEDURE — 84100 ASSAY OF PHOSPHORUS: CPT

## 2023-03-04 PROCEDURE — 80069 RENAL FUNCTION PANEL: CPT | Performed by: INTERNAL MEDICINE

## 2023-03-04 PROCEDURE — 25010000002 FUROSEMIDE PER 20 MG: Performed by: INTERNAL MEDICINE

## 2023-03-04 PROCEDURE — 87040 BLOOD CULTURE FOR BACTERIA: CPT | Performed by: INTERNAL MEDICINE

## 2023-03-04 PROCEDURE — 84145 PROCALCITONIN (PCT): CPT | Performed by: INTERNAL MEDICINE

## 2023-03-04 PROCEDURE — 84132 ASSAY OF SERUM POTASSIUM: CPT | Performed by: INTERNAL MEDICINE

## 2023-03-04 PROCEDURE — 93306 TTE W/DOPPLER COMPLETE: CPT | Performed by: INTERNAL MEDICINE

## 2023-03-04 PROCEDURE — 85025 COMPLETE CBC W/AUTO DIFF WBC: CPT

## 2023-03-04 PROCEDURE — 93010 ELECTROCARDIOGRAM REPORT: CPT | Performed by: INTERNAL MEDICINE

## 2023-03-04 PROCEDURE — 93306 TTE W/DOPPLER COMPLETE: CPT

## 2023-03-04 PROCEDURE — 71250 CT THORAX DX C-: CPT

## 2023-03-04 PROCEDURE — 99222 1ST HOSP IP/OBS MODERATE 55: CPT | Performed by: INTERNAL MEDICINE

## 2023-03-04 PROCEDURE — 93005 ELECTROCARDIOGRAM TRACING: CPT | Performed by: INTERNAL MEDICINE

## 2023-03-04 PROCEDURE — 25010000002 HEPARIN (PORCINE) PER 1000 UNITS

## 2023-03-04 PROCEDURE — 25010000002 DOPAMINE PER 40 MG

## 2023-03-04 PROCEDURE — 83735 ASSAY OF MAGNESIUM: CPT

## 2023-03-04 PROCEDURE — 84484 ASSAY OF TROPONIN QUANT: CPT | Performed by: PHYSICIAN ASSISTANT

## 2023-03-04 RX ORDER — HYDRALAZINE HYDROCHLORIDE 20 MG/ML
10 INJECTION INTRAMUSCULAR; INTRAVENOUS EVERY 4 HOURS PRN
Status: DISCONTINUED | OUTPATIENT
Start: 2023-03-04 | End: 2023-03-08 | Stop reason: HOSPADM

## 2023-03-04 RX ORDER — SODIUM CHLORIDE 0.9 % (FLUSH) 0.9 %
10 SYRINGE (ML) INJECTION AS NEEDED
Status: DISCONTINUED | OUTPATIENT
Start: 2023-03-04 | End: 2023-03-08 | Stop reason: HOSPADM

## 2023-03-04 RX ORDER — NICOTINE POLACRILEX 4 MG
15 LOZENGE BUCCAL
Status: DISCONTINUED | OUTPATIENT
Start: 2023-03-04 | End: 2023-03-08 | Stop reason: HOSPADM

## 2023-03-04 RX ORDER — INSULIN LISPRO 100 [IU]/ML
0-7 INJECTION, SOLUTION INTRAVENOUS; SUBCUTANEOUS EVERY 4 HOURS
Status: DISCONTINUED | OUTPATIENT
Start: 2023-03-04 | End: 2023-03-06

## 2023-03-04 RX ORDER — FUROSEMIDE 10 MG/ML
40 INJECTION INTRAMUSCULAR; INTRAVENOUS ONCE
Status: COMPLETED | OUTPATIENT
Start: 2023-03-04 | End: 2023-03-04

## 2023-03-04 RX ORDER — HEPARIN SODIUM 5000 [USP'U]/ML
5000 INJECTION, SOLUTION INTRAVENOUS; SUBCUTANEOUS EVERY 12 HOURS SCHEDULED
Status: DISCONTINUED | OUTPATIENT
Start: 2023-03-04 | End: 2023-03-06

## 2023-03-04 RX ORDER — ACETAMINOPHEN 650 MG/1
650 SUPPOSITORY RECTAL EVERY 4 HOURS PRN
Status: DISCONTINUED | OUTPATIENT
Start: 2023-03-04 | End: 2023-03-08 | Stop reason: HOSPADM

## 2023-03-04 RX ORDER — SODIUM CHLORIDE 0.9 % (FLUSH) 0.9 %
10 SYRINGE (ML) INJECTION EVERY 12 HOURS SCHEDULED
Status: DISCONTINUED | OUTPATIENT
Start: 2023-03-04 | End: 2023-03-08 | Stop reason: HOSPADM

## 2023-03-04 RX ORDER — SODIUM CHLORIDE 9 MG/ML
100 INJECTION, SOLUTION INTRAVENOUS CONTINUOUS
Status: DISCONTINUED | OUTPATIENT
Start: 2023-03-04 | End: 2023-03-05

## 2023-03-04 RX ORDER — ACETAMINOPHEN 325 MG/1
650 TABLET ORAL EVERY 4 HOURS PRN
Status: DISCONTINUED | OUTPATIENT
Start: 2023-03-04 | End: 2023-03-08 | Stop reason: HOSPADM

## 2023-03-04 RX ORDER — KETOROLAC TROMETHAMINE 5 MG/ML
1 SOLUTION OPHTHALMIC 4 TIMES DAILY
Status: DISCONTINUED | OUTPATIENT
Start: 2023-03-04 | End: 2023-03-08 | Stop reason: HOSPADM

## 2023-03-04 RX ORDER — CALCIUM GLUCONATE 20 MG/ML
1 INJECTION, SOLUTION INTRAVENOUS ONCE
Status: COMPLETED | OUTPATIENT
Start: 2023-03-04 | End: 2023-03-04

## 2023-03-04 RX ORDER — DEXTROSE MONOHYDRATE 25 G/50ML
25 INJECTION, SOLUTION INTRAVENOUS
Status: DISCONTINUED | OUTPATIENT
Start: 2023-03-04 | End: 2023-03-08 | Stop reason: HOSPADM

## 2023-03-04 RX ORDER — ONDANSETRON 2 MG/ML
4 INJECTION INTRAMUSCULAR; INTRAVENOUS EVERY 6 HOURS PRN
Status: DISCONTINUED | OUTPATIENT
Start: 2023-03-04 | End: 2023-03-08 | Stop reason: HOSPADM

## 2023-03-04 RX ORDER — IBUPROFEN 600 MG/1
1 TABLET ORAL
Status: DISCONTINUED | OUTPATIENT
Start: 2023-03-04 | End: 2023-03-08 | Stop reason: HOSPADM

## 2023-03-04 RX ORDER — SODIUM CHLORIDE 9 MG/ML
40 INJECTION, SOLUTION INTRAVENOUS AS NEEDED
Status: DISCONTINUED | OUTPATIENT
Start: 2023-03-04 | End: 2023-03-08 | Stop reason: HOSPADM

## 2023-03-04 RX ORDER — DOPAMINE HYDROCHLORIDE 160 MG/100ML
2-20 INJECTION, SOLUTION INTRAVENOUS
Status: DISCONTINUED | OUTPATIENT
Start: 2023-03-04 | End: 2023-03-07

## 2023-03-04 RX ORDER — ALBUTEROL SULFATE 2.5 MG/3ML
2.5 SOLUTION RESPIRATORY (INHALATION) EVERY 6 HOURS PRN
Status: DISCONTINUED | OUTPATIENT
Start: 2023-03-04 | End: 2023-03-08 | Stop reason: HOSPADM

## 2023-03-04 RX ADMIN — Medication 10 ML: at 08:31

## 2023-03-04 RX ADMIN — DOXYCYCLINE 100 MG: 100 INJECTION, POWDER, LYOPHILIZED, FOR SOLUTION INTRAVENOUS at 06:27

## 2023-03-04 RX ADMIN — KETOROLAC TROMETHAMINE 1 DROP: 0.5 SOLUTION OPHTHALMIC at 17:39

## 2023-03-04 RX ADMIN — SODIUM CHLORIDE 100 ML/HR: 9 INJECTION, SOLUTION INTRAVENOUS at 11:15

## 2023-03-04 RX ADMIN — SODIUM CHLORIDE 100 ML/HR: 9 INJECTION, SOLUTION INTRAVENOUS at 01:46

## 2023-03-04 RX ADMIN — FUROSEMIDE 40 MG: 10 INJECTION, SOLUTION INTRAMUSCULAR; INTRAVENOUS at 12:53

## 2023-03-04 RX ADMIN — DOPAMINE HYDROCHLORIDE IN DEXTROSE 2 MCG/KG/MIN: 1.6 INJECTION, SOLUTION INTRAVENOUS at 02:10

## 2023-03-04 RX ADMIN — SODIUM CHLORIDE 250 ML: 9 INJECTION, SOLUTION INTRAVENOUS at 02:27

## 2023-03-04 RX ADMIN — Medication 10 ML: at 21:00

## 2023-03-04 RX ADMIN — KETOROLAC TROMETHAMINE 1 DROP: 0.5 SOLUTION OPHTHALMIC at 21:00

## 2023-03-04 RX ADMIN — DOXYCYCLINE 100 MG: 100 INJECTION, POWDER, LYOPHILIZED, FOR SOLUTION INTRAVENOUS at 15:46

## 2023-03-04 RX ADMIN — KETOROLAC TROMETHAMINE 1 DROP: 0.5 SOLUTION OPHTHALMIC at 11:46

## 2023-03-04 RX ADMIN — HEPARIN SODIUM 5000 UNITS: 5000 INJECTION INTRAVENOUS; SUBCUTANEOUS at 21:00

## 2023-03-04 RX ADMIN — KETOROLAC TROMETHAMINE 1 DROP: 0.5 SOLUTION OPHTHALMIC at 08:31

## 2023-03-04 RX ADMIN — HEPARIN SODIUM 5000 UNITS: 5000 INJECTION INTRAVENOUS; SUBCUTANEOUS at 01:52

## 2023-03-04 RX ADMIN — Medication 10 ML: at 01:46

## 2023-03-04 RX ADMIN — DOPAMINE HYDROCHLORIDE IN DEXTROSE 10 MCG/KG/MIN: 1.6 INJECTION, SOLUTION INTRAVENOUS at 11:32

## 2023-03-04 RX ADMIN — CALCIUM GLUCONATE 1 G: 20 INJECTION, SOLUTION INTRAVENOUS at 12:53

## 2023-03-04 RX ADMIN — DOPAMINE HYDROCHLORIDE IN DEXTROSE 10 MCG/KG/MIN: 1.6 INJECTION, SOLUTION INTRAVENOUS at 18:57

## 2023-03-04 RX ADMIN — HEPARIN SODIUM 5000 UNITS: 5000 INJECTION INTRAVENOUS; SUBCUTANEOUS at 08:31

## 2023-03-04 NOTE — PROGRESS NOTES
[facility] INPATIENT PROGRESS NOTE         [facility] CARDIAC INTENSIVE CARE    3/4/2023      PATIENT IDENTIFICATION:  Name: Tamara Singh ADMIT: 3/3/2023   : 1940  PCP: Yane Lama APRN    MRN: [id number] LOS: 0 days   AGE/SEX: 83 y.o. female  ROOM: [facility]                     LOS 0    Reason for visit: Traumatic bradycardia      SUBJECTIVE:      Resting comfortably.  Denies productive cough or chest pain.  On 3 L supplemental oxygen.  Heart rate in the low 50s on dopamine.  Cardiology consultation pending.  I am seeing the patient for the first time today.  All patient problems are new to me.      Objective   OBJECTIVE:    Vital Sign Min/Max for last 24 hours  Temp  Min: 90.5 °F (32.5 °C)  Max: 97.9 °F (36.6 °C)   BP  Min: 81/35  Max: 142/63   Pulse  Min: 27  Max: 66   Resp  Min: 14  Max: 16   SpO2  Min: 87 %  Max: 98 %   No data recorded   Weight  Min: 90.7 kg (200 lb)  Max: 95 kg (209 lb 7 oz)                         Body mass index is 38.23 kg/m².    Intake/Output Summary (Last 24 hours) at 3/4/2023 1205  Last data filed at 3/4/2023 0800  Gross per 24 hour   Intake 1798 ml   Output 0 ml   Net 1798 ml         Exam:  GEN:  No distress, appears stated age  EYES:   PERRL, anicteric sclerae  ENT:    External ears/nose normal, OP clear  NECK:  No adenopathy, midline trachea  LUNGS: Normal chest on inspection, palpation and auscultation  CV:  Normal S1S2, without murmur  ABD:  Nontender, nondistended, no hepatosplenomegaly, +BS  EXT:  No edema.  No cyanosis or clubbing.  No mottling and normal cap refill.    Assessment     Scheduled meds:  doxycycline, 100 mg, Intravenous, Q12H  heparin (porcine), 5,000 Units, Subcutaneous, Q12H  insulin lispro, 0-7 Units, Subcutaneous, Q4H  ketorolac, 1 drop, Left Eye, 4x Daily  sodium chloride, 10 mL, Intravenous, Q12H      IV meds:                      DOPamine, 2-20 mcg/kg/min, Last Rate: 10 mcg/kg/min (23 1132)  sodium chloride, 100 mL/hr, Last Rate:  100 mL/hr (03/04/23 1115)      Data Review:  Results from last 7 days   Lab Units 03/04/23  0323 03/04/23  0132 03/03/23 2223   SODIUM mmol/L 138 136 134*   POTASSIUM mmol/L 5.9* 5.6* 6.1*   CHLORIDE mmol/L 106 105 100   CO2 mmol/L 22.1 22.1 20.0*   BUN mg/dL 81* 79* 86*   CREATININE mg/dL 1.22* 1.26* 1.64*   GLUCOSE mg/dL 70 24* 134*   CALCIUM mg/dL 9.1 9.1 9.4         Estimated Creatinine Clearance: 38.2 mL/min (A) (by C-G formula based on SCr of 1.22 mg/dL (H)).  Results from last 7 days   Lab Units 03/04/23 0323 03/04/23 0132 03/03/23  2223   WBC 10*3/mm3 4.86 5.49 4.74   HEMOGLOBIN g/dL 10.6* 10.7* 11.6*   PLATELETS 10*3/mm3 199 184 223     Results from last 7 days   Lab Units 03/03/23  2223   INR  2.56*     Results from last 7 days   Lab Units 03/03/23  2223   ALT (SGPT) U/L 42*   AST (SGOT) U/L 40*         Results from last 7 days   Lab Units 03/04/23 0323 03/04/23  0027   PROCALCITONIN ng/mL 0.11 0.09   LACTATE mmol/L 1.0  --          Glucose   Date/Time Value Ref Range Status   03/04/2023 0717 92 70 - 130 mg/dL Final     Comment:     Meter: [id number] : [id number] Enrique DOOLEY   03/04/2023 0533 95 70 - 130 mg/dL Final     Comment:     Meter: [id number] : [id number] Alec Montoya RN   03/04/2023 0318 79 70 - 130 mg/dL Final     Comment:     Meter: [id number] : [id number] Alec Montoya RN     Chest x-ray 3/3 reviewed    CT chest 3/4 reviewed: Groundglass infiltrates suggestive of infectious versus inflammatory process.  Unable to rule out neoplastic.  Large pulmonary AVM left base.          Microbiology reviewed              Active Hospital Problems    Diagnosis  POA   • **Symptomatic bradycardia [R00.1]  Yes      Resolved Hospital Problems   No resolved problems to display.         ASSESSMENT:    1. Symptomatic bradycardia  2. Hyperkalemia, potassium 6.1  3. Acute kidney injury  4. Hypothermia  5. Stable left lower lobe AVM  6. Right multifocal  infiltrate/pneumonia  7. Obstructive sleep apnea, not currently on PAP therapy  8. Atrial fibrillation  9. Chronic anticoagulation with Xarelto  10. Chronic back pain on narcotics  11. Obesity (BMI 38)          PLAN:    Titrating dopamine for symptomatic bradycardia.  2D echo.  Cardiology consultation.  Temperature improved: Unclear etiology of her initial hypothermia.  2D echo.  Continue antibiotic.  DVT prophylaxis.        CCT: 35 min    Jose Holcomb MD  Pulmonary and Critical Care Medicine  [facility]  3/4/2023    12:05 EST

## 2023-03-04 NOTE — ED PROVIDER NOTES
EMERGENCY DEPARTMENT ENCOUNTER    Room Number:  3007/1  Date of encounter:  3/4/2023  PCP: Yane Lama APRN  Patient Care Team:  Yane Lama APRN as PCP - General (Family Medicine)  Chris Sy III, MD as Surgeon (Thoracic Surgery)  Evens Miller III, MD as Consulting Physician (Cardiology)   Independent Historians: Patient    HPI:  Chief Complaint: Shortness of breath  A complete HPI/ROS/PMH/PSH/SH/FH are unobtainable due to: N/A    Chronic or social conditions impacting patient care (social determinants of health): None    Context: Tamara Singh is a 83 y.o. female with past medical history of HTN, CAD, AF on Xarelto, pulmonary hypertension, SALAS, and lymphedema who arrives to the ED with complaint of shortness of breath.  Patient states that her symptoms worsened at around 5:00 this evening with increased shortness of breath, generalized weakness, and increased swelling to her legs over the last couple weeks.  Patient denies any fever, cough, or chest pain.    Review of prior external notes (non-ED): Most recent office visit was in September 2022 for establishment of care.    Review of prior external test results outside of this encounter: Echo from July 2021 shows an EF of 70% and indeterminate left diastolic function.    PAST MEDICAL HISTORY  Active Ambulatory Problems     Diagnosis Date Noted   • Lumbar radiculopathy 04/06/2016   • Anxiety 04/06/2016   • Secondary polycythemia 04/06/2016   • Arteriovenous malformation 04/06/2016   • Venous stasis dermatitis 04/06/2016   • Eczema 04/06/2016   • Hypertension 04/06/2016   • Hypoglycemia 04/06/2016   • Knee pain 04/06/2016   • Low back pain 04/06/2016   • Lymphedema 04/06/2016   • Obstructive sleep apnea syndrome 04/06/2016   • Pain in thoracic spine 04/06/2016   • Varicose veins of lower extremity with inflammation 04/06/2016   • Cobalamin deficiency 04/06/2016   • Vitamin D deficiency 04/06/2016   • Muscle spasms of both lower extremities 07/28/2016    • Generalized osteoarthritis 04/17/2019   • Pulmonary hypertension (HCC) 02/05/2020   • High risk medication use 04/27/2020   • Atrial fibrillation (HCC)      Resolved Ambulatory Problems     Diagnosis Date Noted   • Obesity, morbid, BMI 50 or higher (HCC) 04/06/2016     Past Medical History:   Diagnosis Date   • Abnormal ECG 06/05/2021   • Arthritis    • Asthma    • Cancer (HCC)    • Cholelithiasis    • Coronary artery disease    • Legally blind    • Macular degeneration    • Obesity    • Sleep apnea    • Urinary tract infection    • Visual impairment        The patient has started, but not completed, their COVID-19 vaccination series.    PAST SURGICAL HISTORY  Past Surgical History:   Procedure Laterality Date   • CHOLECYSTECTOMY  1974         FAMILY HISTORY  Family History   Problem Relation Age of Onset   • Heart attack Mother 86   • Stroke Mother    • Heart disease Mother 80   • Hypertension Mother    • Heart failure Mother    • Heart attack Brother 70   • Asthma Brother    • Hyperlipidemia Brother    • Thyroid disease Brother    • Arthritis Brother    • Heart failure Father    • Kidney disease Sister         Stage 5 on dialysis         SOCIAL HISTORY  Social History     Socioeconomic History   • Marital status: Single   Tobacco Use   • Smoking status: Never   • Smokeless tobacco: Never   Vaping Use   • Vaping Use: Never used   Substance and Sexual Activity   • Alcohol use: Never   • Drug use: No     Types: Hydrocodone   • Sexual activity: Never         ALLERGIES  Moxifloxacin and Penicillins        REVIEW OF SYSTEMS  Review of Systems     All systems reviewed and negative except for those discussed in HPI.       PHYSICAL EXAM    I have reviewed the triage vital signs and nursing notes.    ED Triage Vitals [03/03/23 2207]   Temp Heart Rate Resp BP SpO2   -- (!) 31 14 132/57 96 %      Temp src Heart Rate Source Patient Position BP Location FiO2 (%)   Tympanic Monitor Lying Right arm --       Physical  Exam    GENERAL: alert and oriented x4, obese, not distressed  HENT: normocephalic, atraumatic, dry mucous membranes  EYES: no scleral icterus, PERRL, EOMI  CV: regular rhythm, bradycardic  RESPIRATORY: normal effort, CTAB  ABDOMEN: soft/nontender, no rebound or guarding  MUSCULOSKELETAL: no deformity, 3+ pitting edema BLE and changes of chronic venous stasis  NEURO: alert, moves all extremities, no focal neuro deficits, follows commands  SKIN: warm, dry, no rash   Psych: Appropriate mood and affect      Nursing notes and vital signs reviewed      LAB RESULTS  Recent Results (from the past 24 hour(s))   ECG 12 Lead Dyspnea    Collection Time: 03/03/23 10:15 PM   Result Value Ref Range    QT Interval 544 ms   Comprehensive Metabolic Panel    Collection Time: 03/03/23 10:23 PM    Specimen: Arm, Left; Blood   Result Value Ref Range    Glucose 134 (H) 65 - 99 mg/dL    BUN 86 (H) 8 - 23 mg/dL    Creatinine 1.64 (H) 0.57 - 1.00 mg/dL    Sodium 134 (L) 136 - 145 mmol/L    Potassium 6.1 (C) 3.5 - 5.2 mmol/L    Chloride 100 98 - 107 mmol/L    CO2 20.0 (L) 22.0 - 29.0 mmol/L    Calcium 9.4 8.6 - 10.5 mg/dL    Total Protein 7.9 6.0 - 8.5 g/dL    Albumin 4.2 3.5 - 5.2 g/dL    ALT (SGPT) 42 (H) 1 - 33 U/L    AST (SGOT) 40 (H) 1 - 32 U/L    Alkaline Phosphatase 126 (H) 39 - 117 U/L    Total Bilirubin 0.5 0.0 - 1.2 mg/dL    Globulin 3.7 gm/dL    A/G Ratio 1.1 g/dL    BUN/Creatinine Ratio 52.4 (H) 7.0 - 25.0    Anion Gap 14.0 5.0 - 15.0 mmol/L    eGFR 31.1 (L) >60.0 mL/min/1.73   Protime-INR    Collection Time: 03/03/23 10:23 PM    Specimen: Arm, Left; Blood   Result Value Ref Range    Protime 27.8 (H) 11.7 - 14.2 Seconds    INR 2.56 (H) 0.90 - 1.10   BNP    Collection Time: 03/03/23 10:23 PM    Specimen: Arm, Left; Blood   Result Value Ref Range    proBNP 803.0 0.0 - 1,800.0 pg/mL   High Sensitivity Troponin T    Collection Time: 03/03/23 10:23 PM    Specimen: Arm, Left; Blood   Result Value Ref Range    HS Troponin T 36 (H) <10  ng/L   Magnesium    Collection Time: 03/03/23 10:23 PM    Specimen: Arm, Left; Blood   Result Value Ref Range    Magnesium 2.7 (H) 1.6 - 2.4 mg/dL   CBC Auto Differential    Collection Time: 03/03/23 10:23 PM    Specimen: Arm, Left; Blood   Result Value Ref Range    WBC 4.74 3.40 - 10.80 10*3/mm3    RBC 3.75 (L) 3.77 - 5.28 10*6/mm3    Hemoglobin 11.6 (L) 12.0 - 15.9 g/dL    Hematocrit 33.0 (L) 34.0 - 46.6 %    MCV 88.0 79.0 - 97.0 fL    MCH 30.9 26.6 - 33.0 pg    MCHC 35.2 31.5 - 35.7 g/dL    RDW 18.2 (H) 12.3 - 15.4 %    RDW-SD 58.4 (H) 37.0 - 54.0 fl    MPV 9.4 6.0 - 12.0 fL    Platelets 223 140 - 450 10*3/mm3    Neutrophil % 44.7 42.7 - 76.0 %    Lymphocyte % 45.4 (H) 19.6 - 45.3 %    Monocyte % 8.2 5.0 - 12.0 %    Eosinophil % 1.3 0.3 - 6.2 %    Basophil % 0.2 0.0 - 1.5 %    Immature Grans % 0.2 0.0 - 0.5 %    Neutrophils, Absolute 2.12 1.70 - 7.00 10*3/mm3    Lymphocytes, Absolute 2.15 0.70 - 3.10 10*3/mm3    Monocytes, Absolute 0.39 0.10 - 0.90 10*3/mm3    Eosinophils, Absolute 0.06 0.00 - 0.40 10*3/mm3    Basophils, Absolute 0.01 0.00 - 0.20 10*3/mm3    Immature Grans, Absolute 0.01 0.00 - 0.05 10*3/mm3    nRBC 0.0 0.0 - 0.2 /100 WBC   TSH    Collection Time: 03/03/23 10:23 PM    Specimen: Arm, Left; Blood   Result Value Ref Range    TSH 7.460 (H) 0.270 - 4.200 uIU/mL   T4, Free    Collection Time: 03/03/23 10:23 PM    Specimen: Arm, Left; Blood   Result Value Ref Range    Free T4 1.08 0.93 - 1.70 ng/dL   Urinalysis With Microscopic If Indicated (No Culture) - Straight Cath    Collection Time: 03/03/23 10:38 PM    Specimen: Straight Cath; Urine   Result Value Ref Range    Color, UA Yellow Yellow, Straw    Appearance, UA Clear Clear    pH, UA <=5.0 5.0 - 8.0    Specific Gravity, UA 1.020 1.005 - 1.030    Glucose, UA Negative Negative    Ketones, UA Negative Negative    Bilirubin, UA Negative Negative    Blood, UA Negative Negative    Protein, UA Negative Negative    Leuk Esterase, UA Negative Negative     Nitrite, UA Negative Negative    Urobilinogen, UA 0.2 E.U./dL 0.2 - 1.0 E.U./dL   T3, Free    Collection Time: 03/04/23 12:27 AM    Specimen: Blood   Result Value Ref Range    T3, Free 1.87 (L) 2.00 - 4.40 pg/mL   Procalcitonin    Collection Time: 03/04/23 12:27 AM    Specimen: Blood   Result Value Ref Range    Procalcitonin 0.09 0.00 - 0.25 ng/mL   Basic Metabolic Panel    Collection Time: 03/04/23  1:32 AM    Specimen: Blood   Result Value Ref Range    Glucose 24 (C) 65 - 99 mg/dL    BUN 79 (H) 8 - 23 mg/dL    Creatinine 1.26 (H) 0.57 - 1.00 mg/dL    Sodium 136 136 - 145 mmol/L    Potassium 5.6 (H) 3.5 - 5.2 mmol/L    Chloride 105 98 - 107 mmol/L    CO2 22.1 22.0 - 29.0 mmol/L    Calcium 9.1 8.6 - 10.5 mg/dL    BUN/Creatinine Ratio 62.7 (H) 7.0 - 25.0    Anion Gap 8.9 5.0 - 15.0 mmol/L    eGFR 42.7 (L) >60.0 mL/min/1.73   Magnesium    Collection Time: 03/04/23  1:32 AM    Specimen: Blood   Result Value Ref Range    Magnesium 2.4 1.6 - 2.4 mg/dL   Phosphorus    Collection Time: 03/04/23  1:32 AM    Specimen: Blood   Result Value Ref Range    Phosphorus 4.2 2.5 - 4.5 mg/dL   CBC Auto Differential    Collection Time: 03/04/23  1:32 AM    Specimen: Blood   Result Value Ref Range    WBC 5.49 3.40 - 10.80 10*3/mm3    RBC 3.50 (L) 3.77 - 5.28 10*6/mm3    Hemoglobin 10.7 (L) 12.0 - 15.9 g/dL    Hematocrit 31.6 (L) 34.0 - 46.6 %    MCV 90.3 79.0 - 97.0 fL    MCH 30.6 26.6 - 33.0 pg    MCHC 33.9 31.5 - 35.7 g/dL    RDW 18.6 (H) 12.3 - 15.4 %    RDW-SD 61.0 (H) 37.0 - 54.0 fl    MPV 9.8 6.0 - 12.0 fL    Platelets 184 140 - 450 10*3/mm3    Neutrophil % 68.3 42.7 - 76.0 %    Lymphocyte % 21.7 19.6 - 45.3 %    Monocyte % 8.9 5.0 - 12.0 %    Eosinophil % 0.4 0.3 - 6.2 %    Basophil % 0.2 0.0 - 1.5 %    Immature Grans % 0.5 0.0 - 0.5 %    Neutrophils, Absolute 3.75 1.70 - 7.00 10*3/mm3    Lymphocytes, Absolute 1.19 0.70 - 3.10 10*3/mm3    Monocytes, Absolute 0.49 0.10 - 0.90 10*3/mm3    Eosinophils, Absolute 0.02 0.00 - 0.40  10*3/mm3    Basophils, Absolute 0.01 0.00 - 0.20 10*3/mm3    Immature Grans, Absolute 0.03 0.00 - 0.05 10*3/mm3    nRBC 0.0 0.0 - 0.2 /100 WBC   High Sensitivity Troponin T 2Hr    Collection Time: 03/04/23  1:58 AM    Specimen: Blood   Result Value Ref Range    HS Troponin T 33 (H) <10 ng/L    Troponin T Delta -3 >=-4 - <+4 ng/L   ECG 12 Lead Rhythm Change    Collection Time: 03/04/23  2:16 AM   Result Value Ref Range    QT Interval 533 ms   POC Glucose Once    Collection Time: 03/04/23  3:18 AM    Specimen: Blood   Result Value Ref Range    Glucose 79 70 - 130 mg/dL       Ordered the above labs and independently reviewed and interpreted the results by me.        RADIOLOGY  XR Chest 1 View    Result Date: 3/3/2023  SINGLE VIEW OF THE CHEST  HISTORY: Shortness of air  COMPARISON: 08/31/2022  FINDINGS: Cardiomegaly is present. There is no vascular congestion. No pneumothorax is seen. Area of nodularity seen at the left lung base, which corresponds to known AVM. Patient does have blunting of the left costophrenic angle. Effusion cannot be excluded.      Blunting of the left costophrenic angle may be related to chronic scarring. However, small effusion cannot be excluded.  This report was finalized on 3/3/2023 11:04 PM by Dr. Melani Ramon M.D.        I ordered the above noted radiological studies.  These were independently interpreted and reviewed by me.  See dictation for official radiology interpretation.      PROCEDURES    Critical Care  Performed by: Abelardo Hicks PA  Authorized by: Jorje Mesa MD     Critical care provider statement:     Critical care time (minutes):  45    Critical care was necessary to treat or prevent imminent or life-threatening deterioration of the following conditions:  Cardiac failure, circulatory failure, CNS failure or compromise, metabolic crisis, renal failure and shock    Critical care was time spent personally by me on the following activities:  Ordering and  performing treatments and interventions, ordering and review of laboratory studies, ordering and review of radiographic studies, pulse oximetry, re-evaluation of patient's condition, discussions with consultants, evaluation of patient's response to treatment and examination of patient          MEDICATIONS GIVEN IN ER    Medications   sodium chloride 0.9 % flush 10 mL (has no administration in time range)   sodium chloride 0.9 % flush 10 mL (10 mL Intravenous Given 3/4/23 0146)   sodium chloride 0.9 % flush 10 mL (has no administration in time range)   sodium chloride 0.9 % infusion 40 mL (has no administration in time range)   dextrose (GLUTOSE) oral gel 15 g (has no administration in time range)   dextrose (D50W) (25 g/50 mL) IV injection 25 g (has no administration in time range)   glucagon (GLUCAGEN) injection 1 mg (has no administration in time range)   heparin (porcine) 5000 UNIT/ML injection 5,000 Units (5,000 Units Subcutaneous Given 3/4/23 0152)   sodium chloride 0.9 % infusion (100 mL/hr Intravenous New Bag 3/4/23 0146)   acetaminophen (TYLENOL) tablet 650 mg (has no administration in time range)     Or   acetaminophen (TYLENOL) suppository 650 mg (has no administration in time range)   hydrALAZINE (APRESOLINE) injection 10 mg (has no administration in time range)   albuterol (PROVENTIL) nebulizer solution 0.083% 2.5 mg/3mL (has no administration in time range)   insulin lispro (ADMELOG) injection 0-7 Units (0 Units Subcutaneous Not Given 3/4/23 0323)   ondansetron (ZOFRAN) injection 4 mg (has no administration in time range)   ketorolac (ACULAR) 0.5 % ophthalmic solution 1 drop (has no administration in time range)   DOPamine 400 mg in 250 mL D5W infusion (6 mcg/kg/min × 95 kg Intravenous Rate/Dose Change 3/4/23 0251)   doxycycline (VIBRAMYCIN) 100 mg in sodium chloride 0.9 % 100 mL IVPB-VTB (has no administration in time range)   sodium chloride 0.9 % bolus 1,000 mL (0 mL Intravenous Stopped 3/4/23 0105)    insulin regular (humuLIN R,novoLIN R) injection 10 Units (10 Units Intravenous Given 3/3/23 2344)   dextrose (D50W) (25 g/50 mL) IV injection 50 mL (50 mL Intravenous Given 3/3/23 2344)   sodium bicarbonate injection 8.4% 50 mEq (50 mEq Intravenous Given 3/3/23 2344)   sodium chloride 0.9 % bolus 250 mL (250 mL Intravenous New Bag 3/4/23 0227)         PROGRESS, DATA ANALYSIS, CONSULTS, AND MEDICAL DECISION MAKING    All labs have been independently reviewed by me.  All radiology studies have been reviewed by me and discussed with radiologist dictating the report.   EKG's independently viewed and interpreted by me.  Discussion below represents my analysis of pertinent findings related to patient's condition, differential diagnosis, treatment plan and final disposition.    DDx:  Includes, but is not limited to CHF exacerbation, ACS, angina    After my initial assessment I am concerned about cardiac arrhythmia and patient may need hospitalization for cardiology consultation.    ED Course as of 03/04/23 0426   Fri Mar 03, 2023   2222 EKG          EKG time: 2215  Rhythm/Rate: Junctional rhythm at 31 bpm  P waves and MD: N/A  QRS, axis: LBBB  ST and T waves: No acute ST/T wave changes    Interpreted Contemporaneously by me, independently viewed  Changed compared to prior EKG of 6/6/2021, patient is no longer in A-fib and the rate is slower   [VARINDER]   2247 WBC: 4.74 [VARINDER]   2248 Hemoglobin(!): 11.6 [VARINDER]   2248 Hematocrit(!): 33.0 [VARINDER]   2248 Platelets: 223 [VARINDER]   2249 Chest x-ray images independently viewed by me, my interpretation is small left pleural effusion, no definite infiltrate or overt signs of failure. [VARINDER]   2300 Patient found to be hypothermic with a rectal temperature of 90.5, placed on a Mariann hugger. [VARINDER]   2327 Patient rechecked, lying in bed, no acute distress.  Discussed results, plan for admission and further evaluation with cardiology.  Patient and sister expressed understanding and agree with plan. [VARINDER]    2328 Glucose(!): 134 [VARINDER]   2328 BUN(!): 86 [VARINDER]   2328 Creatinine(!): 1.64 [VARINDER]   2328 Sodium(!): 134 [VARINDER]   2328 Potassium(!!): 6.1 [VARINDER]   2328 Chloride: 100 [VARINDER]   2328 CO2(!): 20.0 [VARINDER]   2330 Heart rate in the upper 20s, will place pacer pads. [VARINDER]   2347 Spoke with [facility], they felt patient was appropriate for the ICU. [VARINDER]   Sat Mar 04, 2023   0000 Spoke with Patti MORILLO, pulmonology, will accept to the ICU per Dr. Stanley. [VARINDER]   0023 Patient history, ER presentation and evaluation discussed with cardiology, Dr. Mcgee, will consult. [VARINDER]   0032 TSH Baseline(!): 7.460 [VARINDER]   0032 Free T4: 1.08 [VARINDER]      ED Course User Index  [VARINDER] Abelardo Hicks PA       MDM: Have spoken with cardiology and ICU and patient will be admitted for bradycardia, hypothermia, acute kidney injury and hyperkalemia.    PPE:  The patient wore a mask and I wore a mask and all appropriate PPE throughout the entire patient encounter.      AS OF 04:26 EST VITALS:    BP - 122/49  HR - 66  TEMP - 94.1 °F (34.5 °C) (Axillary)  O2 SATS - 91%      DIAGNOSIS  Final diagnoses:   Symptomatic bradycardia   Hyperkalemia   FRANCISCA (acute kidney injury) (HCC)   History of atrial fibrillation   Anticoagulated   Pulmonary hypertension (HCC)   Hypothermia, initial encounter         DISPOSITION  ADMISSION    Discussed treatment plan and reason for admission with pt/family and admitting physician.  Pt/family voiced understanding of the plan for admission for further testing/treatment as needed.         Note Disclaimer: At [facility], we believe that sharing information builds trust and better relationships. You are receiving this note because you recently visited [facility]. It is possible you will see health information before a provider has talked with you about it. This kind of information can be easy to misunderstand. To help you fully understand what it means for your health, we urge you to discuss this note with your provider.     Abelardo Hicks,  PA  03/04/23 0033       Abelardo Hicks, PA  03/04/23 0427

## 2023-03-04 NOTE — ED NOTES
"Pt arrives from home via [facility] #[id number]    EMS states \"Called out for SOA, and weakness that started at 5am.\"      Patient was placed in face mask during first look triage.  Patient was wearing a face mask throughout encounter.  I wore personal protective equipment throughout the encounter.  Hand hygiene was performed before and after patient encounter.       "

## 2023-03-04 NOTE — PLAN OF CARE
Problem: Adult Inpatient Plan of Care  Goal: Plan of Care Review  Outcome: Ongoing, Progressing  Goal: Patient-Specific Goal (Individualized)  Outcome: Ongoing, Progressing  Goal: Absence of Hospital-Acquired Illness or Injury  Outcome: Ongoing, Progressing  Intervention: Identify and Manage Fall Risk  Recent Flowsheet Documentation  Taken 3/4/2023 1800 by Sandra Camacho RN  Safety Promotion/Fall Prevention:   safety round/check completed   clutter free environment maintained  Taken 3/4/2023 1600 by Sandra Camacho RN  Safety Promotion/Fall Prevention:   safety round/check completed   clutter free environment maintained  Taken 3/4/2023 1200 by Sandra Camacho RN  Safety Promotion/Fall Prevention:   safety round/check completed   clutter free environment maintained  Taken 3/4/2023 1000 by Sandra Camacho RN  Safety Promotion/Fall Prevention:   safety round/check completed   clutter free environment maintained  Taken 3/4/2023 0900 by Sandra Camacho RN  Safety Promotion/Fall Prevention:   safety round/check completed   clutter free environment maintained  Taken 3/4/2023 0800 by Sandra Camacho RN  Safety Promotion/Fall Prevention:   safety round/check completed   clutter free environment maintained   fall prevention program maintained  Intervention: Prevent Skin Injury  Recent Flowsheet Documentation  Taken 3/4/2023 1800 by Sandra Camacho RN  Body Position:   tilted   right  Taken 3/4/2023 1600 by Sandra Camacho RN  Body Position:   supine   supine, legs elevated  Skin Protection:   skin-to-device areas padded   tubing/devices free from skin contact  Taken 3/4/2023 1200 by Sandra Camacho RN  Body Position:   tilted   right   position changed independently  Skin Protection:   skin-to-device areas padded   tubing/devices free from skin contact  Taken 3/4/2023 1000 by Sandra Camacho RN  Body Position:   tilted   left   position changed independently  Taken 3/4/2023 0800 by Sandra Camacho RN  Body Position:   tilted    right   position changed independently  Skin Protection:   skin-to-device areas padded   tubing/devices free from skin contact  Intervention: Prevent and Manage VTE (Venous Thromboembolism) Risk  Recent Flowsheet Documentation  Taken 3/4/2023 1600 by Sandra Camacho RN  VTE Prevention/Management: (on sq heparin) --  Range of Motion: ROM (range of motion) performed  Taken 3/4/2023 1200 by Sandra Camacho RN  VTE Prevention/Management: (on subq heparin) --  Range of Motion: ROM (range of motion) performed  Taken 3/4/2023 0800 by Sandra Camacho RN  Activity Management: bedrest  Range of Motion: ROM (range of motion) performed  Intervention: Prevent Infection  Recent Flowsheet Documentation  Taken 3/4/2023 1800 by Sandra Camacho RN  Infection Prevention: single patient room provided  Taken 3/4/2023 1600 by Sandra Camacho RN  Infection Prevention: single patient room provided  Taken 3/4/2023 0800 by Sandra Camacho RN  Infection Prevention:   single patient room provided   hand hygiene promoted  Goal: Optimal Comfort and Wellbeing  Outcome: Ongoing, Progressing  Intervention: Provide Person-Centered Care  Recent Flowsheet Documentation  Taken 3/4/2023 1600 by Sandra Camacho RN  Trust Relationship/Rapport:   care explained   thoughts/feelings acknowledged  Taken 3/4/2023 1200 by Sandra Camacho RN  Trust Relationship/Rapport:   care explained   thoughts/feelings acknowledged  Taken 3/4/2023 0800 by Sandra Camacho RN  Trust Relationship/Rapport:   care explained   thoughts/feelings acknowledged  Goal: Readiness for Transition of Care  Outcome: Ongoing, Progressing   Goal Outcome Evaluation:  Pt remain in cicu on 2L o2 sating 95-96%. A & O follows command. Continue dopamine. Inserted johns today. Adequate output. On cardiac diet. Vital stable. will keep monitoring.

## 2023-03-04 NOTE — H&P
HISTORY AND PHYSICAL    Patient Identification:  Tamara Singh  83 y.o.  female  1940  [id number]            CC: weakness, shortness of breath    History of Present Illness:  Tamara Singh is an 83 year old female with a past medical history of hypertension, chronic back pain, atrial fibrillation on Xarelto, and pulmonary hypertension.  She has no history of coronary artery disease, congestive heart failure, or valvular disease.  She is previously been diagnosed with sleep apnea, but does not currently use CPAP.  Patient has a known stable left lower lobe AVM, stable on CT chest from August 2022.    She presented to the emergency department with chief complaint of shortness of breath.  Patient reports she has had increased weakness, increasing shortness of breath, and increased swelling to her legs over the past couple weeks.  She denies fever, cough or chest pain. ED evaluation revealed hyperkalemia with a potassium of 6.1, creatinine 1.64 (previously 0.97 five months ago), sodium 134, TSH 7.460, and hemoglobin 11.6.  Urinalysis was normal.  EKG on arrival showed junctional rhythm with a left bundle branch block with a heart rate of 31.  Chest x-ray showed blunting of left costophrenic angle, possible related to chronic scarring.  Patient denies change in urinary or bowel habits.  She reports compliance with Lasix, denies use of supplemental potassium chloride or potassium sparing diuretics.     She received insulin, dextrose, sodium bicarb and liter of normal saline in the emergency department.  Cardiology, Dr. Mcgee, was contacted from the ER and agreed to consult on patient.      Review of Systems:  Constitutional: Negative for fever, chills, diaphoresis.  Positive for activity change, fatigue and unexpected weight gain.  HEENT: Negative for congestion, rhinorrhea, sinus pain or pressure.  Negative for eye discharge, itching or pain.  Respiratory: Negative for chest tightness, choking, wheezing.  Positive  for intermittent cough and shortness of breath.  Cardiac: Negative for chest pain or palpitations.  Positive for leg swelling.  GI: Negative for abdominal distention, abdominal pain, constipation, diarrhea, nausea or vomiting.  : Negative for difficulty urinating, dysuria, frequency, urgency, decreased urine output.  Musculoskeletal: Positive for chronic back pain.  Skin: Negative for color change, pallor, rash or wound.  Neurological: Negative for dizziness, headaches, tremors.  Positive for weakness.      Past Medical History:  Past Medical History:   Diagnosis Date   • Abnormal ECG 06/05/2021   • Anxiety    • Arthritis    • Asthma     Used to use inhaler but dont anymore.   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   • Cholelithiasis    • Coronary artery disease    • Hypertension    • Legally blind    • Low back pain    • Macular degeneration    • Obesity    • Pulmonary hypertension (HCC)    • Pulmonary hypertension (HCC)    • Sleep apnea    • Urinary tract infection    • Visual impairment     macular degeneration       Past Surgical History:  Past Surgical History:   Procedure Laterality Date   • CHOLECYSTECTOMY  1974        Home Meds:  (Not in a hospital admission)      Allergies:  Allergies   Allergen Reactions   • Moxifloxacin    • Penicillins        Social History:   Social History     Socioeconomic History   • Marital status: Single   Tobacco Use   • Smoking status: Never   • Smokeless tobacco: Never   Vaping Use   • Vaping Use: Never used   Substance and Sexual Activity   • Alcohol use: Never   • Drug use: No     Types: Hydrocodone   • Sexual activity: Never       Family History:  Family History   Problem Relation Age of Onset   • Heart attack Mother 86   • Stroke Mother    • Heart disease Mother 80   • Hypertension Mother    • Heart failure Mother    • Heart attack Brother 70   • Asthma Brother    • Hyperlipidemia Brother    • Thyroid disease Brother    • Arthritis Brother   "  • Heart failure Father    • Kidney disease Sister         Stage 5 on dialysis       Physical Exam:  /63   Pulse (!) 33   Temp (!) 90.5 °F (32.5 °C) (Rectal)   Resp 14   Ht 157.5 cm (62\")   Wt 90.7 kg (200 lb)   LMP  (LMP Unknown)   SpO2 91%   BMI 36.58 kg/m²  Body mass index is 36.58 kg/m². 91% 90.7 kg (200 lb)    Physical Exam:  Constitutional: Does not appear to be in acute distress.  Head: Normocephalic, atraumatic.  Mouth/throat: Moist mucous membranes.  Eyes: PERRLA, EOM intact, normal conjunctivae.  Neck: Normal range of motion, supple, no JVD, trachea midline.  Cardiovascular: Bradycardic rate, irregular rhythm with frequent PACs and PVCs.  Pulmonary: Normal respiratory effort, lung sounds clear bilaterally, no wheezes, rhonchi or rales.  Abdominal: Obese, soft, nontender, normal bowel sounds  : Not assessed  Musculoskeletal: Normal range of motion, no swelling or tenderness.  3+ pitting edema all bilaterally from knees to feet.  Skin: Warm, dry, PVD changes over bilateral lower extremities, no erythema or ecchymosis.  Capillary fill less than 3 seconds  Neurological: Alert and oriented x3, no focal deficit.    LABS:  No results found for: COVID19    Lab Results   Component Value Date    CALCIUM 9.4 03/03/2023     Results from last 7 days   Lab Units 03/03/23 2223   MAGNESIUM mg/dL 2.7*   SODIUM mmol/L 134*   POTASSIUM mmol/L 6.1*   CHLORIDE mmol/L 100   CO2 mmol/L 20.0*   BUN mg/dL 86*   CREATININE mg/dL 1.64*   GLUCOSE mg/dL 134*   CALCIUM mg/dL 9.4   WBC 10*3/mm3 4.74   HEMOGLOBIN g/dL 11.6*   PLATELETS 10*3/mm3 223   ALT (SGPT) U/L 42*   AST (SGOT) U/L 40*   PROBNP pg/mL 803.0     Lab Results   Component Value Date    TROPONINT 36 (H) 03/03/2023     Results from last 7 days   Lab Units 03/03/23 2223   HSTROP T ng/L 36*                     Results from last 7 days   Lab Units 03/03/23 2223   INR  2.56*         Lab Results   Component Value Date    TSH 6.900 (H) 09/16/2022     Estimated " Creatinine Clearance: 27.7 mL/min (A) (by C-G formula based on SCr of 1.64 mg/dL (H)).  Results from last 7 days   Lab Units 03/03/23  2238   NITRITE UA  Negative        Imaging: I personally visualized the images of scans/x-rays performed within last 3 days.      Assessment:  1. Symptomatic bradycardia  2. Hyperkalemia, potassium 6.1  3. Acute kidney injury  4. Hypothermia  5. Stable left lower lobe AVM  6. Obstructive sleep apnea, not currently on PAP therapy  7. Atrial fibrillation  8. Chronic anticoagulation with Xarelto  9. Chronic back pain on narcotics  10. Obesity (BMI 38)    Recommendations:  · Patient to the ICU for symptomatic bradycardia  · Consult cardiology- Dr. Mcgee spoke to ED provider and agreed to consult in the morning.  · Consult nephrology for acute kidney injury and hyperkalemia.  · Continue passive rewarming, patient was 90.5 °F rectally upon arrival.  Unclear etiology of hypothermia.  · Hold any rate limiting medications given patient's profound bradycardia.  · Monitor potassium after administration of sodium bicarb, dextrose, and insulin.  · Monitor strict I's and O's.  · Echocardiogram ordered for new onset left bundle branch block and bradycardia.  · Currently hemodynamically stable with blood pressure 124/60 and SPO2 94% on 2 L nasal cannula.  · Accu-Cheks/SSI ordered  · TSH abnormal at 7.460, however free T4 is 1.08.  T3 ordered.  · Heparin for VTE prophylaxis.  · NPO.     Patient was placed in face mask upon entering room and kept mask on throughout our encounter. I wore full protective equipment throughout this patient encounter including a face mask, gown and gloves. Hand hygiene was performed before donning protective equipment and after removal when leaving the room.    Tessa Mattson, APRN  3/4/2023  00:02 EST      Much of this encounter note is an electronic transcription/translation of spoken language to printed text using Dragon Software.

## 2023-03-04 NOTE — CONSULTS
Date of Hospital Visit: 23  Encounter Provider: Violeta Mcgee MD  Place of Service: [facility] CARDIOLOGY  Patient Name: Tamara Singh  :1940  [id number]  Referral Provider: Efe Stanley MD    Chief complaint:Bradycardia    History of Present Illness: Tamara Singh is an 83-year-old female who is a patient of Dr. Miller.  She has a history of persistent atrial fibrillation.  She also has obstructive sleep apnea.  She was in the office 6 months ago she does not need any rate control medicines due to her heart rate being controlled on its own.  She has a history of pulmonary AVM and follows with Dr. Sy.  She is working on getting her CPAP machine as of 6 months ago.    She presented to the ER yesterday with generalized weakness and shortness of breath.  She is found to be in a junctional rhythm and hypothermic.  Pulmonary was consulted and she was admitted for further evaluation.  She was started on dopamine drip for hypotension and associated bradycardia.  She also had a new left bundle branch block.  She was also with acute kidney injury with a high potassium and her electrolytes were corrected.  She was admitted to the ICU for further management.  Today she is improved temperature has improved heart rate surprisingly appears to be in sinus rhythm with rates in the 60s on telemetry.  Awaiting EKG.  She is awake alert states that she has just been feeling unwell for past several weeks.  She denies any chest pain or palpitations.  She denies any appreciable shortness of breath but is limited in her activities.  She does complain of edema in the past several weeks    An echocardiogram is pending.  Potassium was 6.1 on admission with a creatinine of 1.22.  TSH 7.46,    Past Medical History:   Diagnosis Date   • Abnormal ECG 2021   • Anxiety    • Arthritis    • Asthma     Used to use inhaler but dont anymore.   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 Authorizing Provider   amLODIPine-benazepril (LOTREL) 10-40 MG per capsule Take 1 capsule by mouth Daily. 3/1/23   Yane Lama APRN   carisoprodol (SOMA) 350 MG tablet Take 1 tablet by mouth 4 (Four) Times a Day As Needed for Muscle Spasms. 6/17/22   Dawson Sauceda MD   furosemide (Lasix) 40 MG tablet Take 1 tablet by mouth Daily. 6/17/22   Dawson Sauceda MD   HYDROcodone-acetaminophen (NORCO) 5-325 MG per tablet Take 1 tablet by mouth Every 4 (Four) Hours As Needed for Moderate Pain  or Severe Pain . 6/2/22   Dawson Sauceda MD   hydrOXYzine (ATARAX) 10 MG tablet Take 1 tablet by mouth At Night As Needed for Anxiety. 1/13/23   Yane Lama APRN   ketorolac (ACULAR) 0.5 % ophthalmic solution INSTILL 1 DROP INTO LEFT EYE 4 TIMES A DAY 3/13/19   Lang Cote MD   multivitamins-minerals (PRESERVISION AREDS 2) capsule capsule Take 1 capsule by mouth 2 (Two) Times a Day. 10/22/17   Lang Cote MD   PROAIR  (90 BASE) MCG/ACT inhaler INHALE 2 PUFFS BY MOUTH 4 TIMES DAILY AS NEEDED 2/5/16   Dawson Sauceda MD   rivaroxaban (Xarelto) 20 MG tablet Take 1 tablet by mouth Daily. 3/25/22   Evens Miller III, MD   silver sulfadiazine (SILVADENE, SSD) 1 % cream Apply  topically to the appropriate area as directed 2 (Two) Times a Day. *CM:I87.2 6/30/22   Dawson Sauceda MD   Vitamin D, Cholecalciferol, 50 MCG (2000 UT) capsule Take 1 tablet by mouth Daily.    Lang Cote MD        Allergies as of  03/03/2023 - Reviewed 03/03/2023   Allergen Reaction Noted   • Moxifloxacin  04/06/2016   • Penicillins  04/06/2016       Social History     Socioeconomic History   • Marital status: Single   Tobacco Use   • Smoking status: Never   • Smokeless tobacco: Never   Vaping Use   • Vaping Use: Never used   Substance and Sexual Activity   • Alcohol use: Never   • Drug use: No     Types: Hydrocodone   • Sexual activity: Never       Family History   Problem Relation Age of Onset   • Heart attack Mother 86   • Stroke Mother    • Heart disease Mother 80   • Hypertension Mother    • Heart failure Mother    • Heart attack Brother 70   • Asthma Brother    • Hyperlipidemia Brother    • Thyroid disease Brother    • Arthritis Brother    • Heart failure Father    • Kidney disease Sister         Stage 5 on dialysis       REVIEW OF SYSTEMS:   ROS was performed and is negative except as outlined in HPI     REVIEW OF SYSTEMS:   CONSTITUTIONAL: Per HPI  HEENT: Eyes: No visual loss, blurred vision, double vision or yellow sclerae. Ears, Nose, Throat: No hearing loss, sneezing, congestion, runny nose or sore throat.   SKIN: No rash or itching.     RESPIRATORY: No  hemoptysis, cough or sputum.   GASTROINTESTINAL: No anorexia, nausea, vomiting or diarrhea. No abdominal pain, bright red blood per rectum or melena.  GENITOURINARY: No burning on urination, hematuria or increased frequency.  NEUROLOGICAL: No headache, dizziness, syncope, paralysis, ataxia, numbness or tingling in the extremities. No change in bowel or bladder control.   HEMATOLOGIC: No anemia, bleeding or bruising.   LYMPHATICS: No enlarged nodes. No history of splenectomy.   PSYCHIATRIC: No history of depression, anxiety, hallucinations.   ENDOCRINOLOGIC: No reports of sweating, cold or heat intolerance. No polyuria or polydipsia.        Objective:   Temp:  [90.5 °F (32.5 °C)-97.9 °F (36.6 °C)] 97.9 °F (36.6 °C)  Heart Rate:  [27-66] 63  Resp:  [14-16] 16  BP: ()/()  "131/43  Body mass index is 38.23 kg/m².  Flowsheet Rows    Flowsheet Row First Filed Value   Admission Height 157.5 cm (62\") Documented at 03/03/2023 2216   Admission Weight 90.7 kg (200 lb) Documented at 03/03/2023 2216        Vitals:    03/04/23 1200   BP: 131/43   Pulse: 63   Resp: 16   Temp:    SpO2: 97%       Physical Exam:   General Appearance:    Awake alert and oriented in no acute distress.   Color:  Skin:  Neuro:  HEENT:    Lungs:     Pink  Warm and dry  No focal, motor or sensory deficits  Neck supple, pupils equal, round and reactive. No JVD, No Bruit  Clear to auscultation,respirations regular, even and                  unlabored    Heart:    Regular rate and rhythm, S1 and S2, no murmur, no gallop, no rub.  1+ edema to mid tibia bilaterally.  JVP difficult to assess due to body habitus.  Normal bilateral radial carotid pulses bilaterally, femoral and distal pulses not appreciable   Chest Wall:    No abnormalities observed   Abdomen:     Normal bowel sounds, no masses, no organomegaly, soft        non-tender, non-distended, no guarding, no ascites noted   Extremities:   Moves all extremities well, 1+ edema, no cyanosis, no redness             CXR 3/3/23  FINDINGS:  Cardiomegaly is present. There is no vascular congestion. No  pneumothorax is seen. Area of nodularity seen at the left lung base,  which corresponds to known AVM. Patient does have blunting of the left  costophrenic angle. Effusion cannot be excluded.     IMPRESSION:  Blunting of the left costophrenic angle may be related to chronic  scarring. However, small effusion cannot be excluded.     This report was finalized on 3/3/2023 11:04 PM by Dr. Melani Ramon M.D.       I personally viewed and interpreted the patient's EKG/Telemetry data      Assessment and plan:  1.  Bradycardia.  Seems to be improving with treatment of hypokalemia.  Check EKG.  While on dopamine this should not be contributing significantly to alteration of AV " block.  2.  Atrial fibrillation.  Appears to be more paroxysmal.  Await more clarity with surface twelve-lead EKG.  Continue with Xarelto and avoidance of AV leatha blocker therapy.    3.  Hypothermia with hypotension, improving.  Wonder if she might have a degree of early sepsis.  4.  Borderline abnormal troponin T.  May be due to hypotension or renal insufficiency.  Await echo results.  Can consider outpatient for predismissal Lexiscan scan.  4.  Abnormal scan chest.  Recommendations per pulmonary service  5.  Large pulmonary venous shunt.  6.  Sleep apnea, on CPAP therapy  7.  Acute renal failure  8.  Left bundle branch block new in the setting of bradycardia.  Await repeat EKG.    9.  Hyperkalemia, improving    Violeta Mcgee MD  03/04/23  15:17 EST.    50 minutes of time spent with reviewing chart talking with patient examining patient and talking with staff.  There was 50% of face-to-face time with family and patient.

## 2023-03-04 NOTE — ED PROVIDER NOTES
MD ATTESTATION NOTE    The AKUA and I have discussed this patient's history, physical exam, and treatment plan.  I have reviewed the documentation and personally had a face to face interaction with the patient. I affirm the documentation and agree with the treatment and plan.  The attached note describes my personal findings.      I provided a substantive portion of the care of the patient.  I personally performed the physical exam in its entirety, and below are my findings.  For this patient encounter, the patient wore surgical mask, I wore full protective PPE including N95 and eye protection.      Brief HPI: This patient is an 83-year-old female with a history of coronary artery disease as well as atrial fibrillation presenting to the emergency room today with shortness of breath.  She is also reported increased leg swelling over the past couple of weeks.  She denies any associated chest pain, cough, fever/chills, back pain, or abdominal pain.      PHYSICAL EXAM  ED Triage Vitals   Temp Heart Rate Resp BP SpO2   03/03/23 2217 03/03/23 2207 03/03/23 2207 03/03/23 2207 03/03/23 2207   (!) 90.5 °F (32.5 °C) (!) 31 14 132/57 96 %      Temp src Heart Rate Source Patient Position BP Location FiO2 (%)   03/03/23 2207 03/03/23 2207 03/03/23 2207 03/03/23 2207 --   Tympanic Monitor Lying Right arm          GENERAL: In mild to moderate distress, nontoxic in appearance  HENT: nares patent  EYES: no scleral icterus  CV: Irregularly, irregular rhythm with a bradycardic rate, no M/R/G  RESPIRATORY: normal effort, lungs clear bilaterally  ABDOMEN: soft, nontender, no palpable mass  MUSCULOSKELETAL: no deformity, no edema  NEURO: alert, moves all extremities, follows commands  PSYCH:  calm, cooperative  SKIN: warm, dry    Vital signs and nursing notes reviewed.        Plan: Given the patient's bradycardia, will obtain an EKG.  We will place the patient on a bear hugger given her hypothermia.  We will obtain labs as well as a chest  x-ray following.  We will monitor closely and reassess following.       Jorje Mesa MD  03/04/23 1264

## 2023-03-04 NOTE — CONSULTS
[facility] Consult Note      Patient Name: Tamara Singh  : 1940  MRN: [id number]  Primary Care Physician:  Yane Lama APRN  Referring Physician: Efe tSanley MD  Date of admission: 3/3/2023    Subjective     Reason for Consult:  FRANCISCA and hyperK    HPI:   Tamara Singh is a 83 y.o. female with well-preserved renal function at baseline, admitted 3/3 for further evaluation of generalized weakness and shortness of breath.  Was found to have a junctional rhythm as low as 27 bpm, severe hypothermia, and chest x-ray with bilateral pleural effusions and ground-glass opacities.  Initial SCR 1.3 with potassium 5.6; more recent values are 1.2 and 5.9, respectively.  Full PMH outlined below; pertinent is hypertension on benazepril; pulmonary hypertension; PAF on AC; and untreated SALAS.  Currently on dopamine to maintain heart rate; 1 amp of calcium gluconate given earlier.  Has required already several I/O catheterizations due to urinary retention since her arrival yesterday.    · Despite developing significant lower extremity edema over the last month or 2, she reports weight loss of 50 pounds over the last 1 year  · States appetite is mediocre; no N/V  · No urinary complaints other than recent decline in urine volumes over the past few days  · Describes orthostatic symptoms over the last few weeks; on benazepril at home    Review of Systems:   14 point review of systems is otherwise negative except for mentioned above on HPI    Personal History     Past Medical History:   Diagnosis Date   • Abnormal ECG 2021   • Anxiety    • Arthritis    • Asthma     Used to use inhaler but dont anymore.   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   • Cholelithiasis    • Coronary artery disease    • Hypertension    • Legally blind    • Low back pain    • Macular degeneration    • Obesity    • Pulmonary hypertension (HCC)    • Pulmonary hypertension (HCC)    • Sleep  apnea    • Urinary tract infection    • Visual impairment     macular degeneration       Past Surgical History:   Procedure Laterality Date   • CHOLECYSTECTOMY  1974       Family History: family history includes Arthritis in her brother; Asthma in her brother; Heart attack (age of onset: 70) in her brother; Heart attack (age of onset: 86) in her mother; Heart disease (age of onset: 80) in her mother; Heart failure in her father and mother; Hyperlipidemia in her brother; Hypertension in her mother; Kidney disease in her sister; Stroke in her mother; Thyroid disease in her brother.    Social History:  reports that she has never smoked. She has never used smokeless tobacco. She reports that she does not drink alcohol and does not use drugs.    Home Medications:  Prior to Admission medications    Medication Sig Start Date End Date Taking? Authorizing Provider   amLODIPine-benazepril (LOTREL) 10-40 MG per capsule Take 1 capsule by mouth Daily. 3/1/23   Yane aLma APRN   carisoprodol (SOMA) 350 MG tablet Take 1 tablet by mouth 4 (Four) Times a Day As Needed for Muscle Spasms. 6/17/22   Dawson Sauceda MD   furosemide (Lasix) 40 MG tablet Take 1 tablet by mouth Daily. 6/17/22   Dawson Sauceda MD   HYDROcodone-acetaminophen (NORCO) 5-325 MG per tablet Take 1 tablet by mouth Every 4 (Four) Hours As Needed for Moderate Pain  or Severe Pain . 6/2/22   Dawson Sauceda MD   hydrOXYzine (ATARAX) 10 MG tablet Take 1 tablet by mouth At Night As Needed for Anxiety. 1/13/23   Yane Lama APRN   ketorolac (ACULAR) 0.5 % ophthalmic solution INSTILL 1 DROP INTO LEFT EYE 4 TIMES A DAY 3/13/19   Lang Cote MD   multivitamins-minerals (PRESERVISION AREDS 2) capsule capsule Take 1 capsule by mouth 2 (Two) Times a Day. 10/22/17   Lang Cote MD   PROAIR  (90 BASE) MCG/ACT inhaler INHALE 2 PUFFS BY MOUTH 4 TIMES DAILY AS NEEDED 2/5/16   Dawson Sauceda MD   rivaroxaban (Xarelto) 20 MG tablet Take 1  tablet by mouth Daily. 3/25/22   Evens Miller III, MD   silver sulfadiazine (SILVADENE, SSD) 1 % cream Apply  topically to the appropriate area as directed 2 (Two) Times a Day. *CM:I87.2 6/30/22   Dawson Sauceda MD   Vitamin D, Cholecalciferol, 50 MCG (2000 UT) capsule Take 1 tablet by mouth Daily.    Provider, MD Lang       Allergies:  Allergies   Allergen Reactions   • Moxifloxacin    • Penicillins        Objective     Vitals:   Temp:  [90.5 °F (32.5 °C)-97.9 °F (36.6 °C)] 97.9 °F (36.6 °C)  Heart Rate:  [27-66] 48  Resp:  [14-16] 16  BP: ()/() 122/41  Flow (L/min):  [2-4] 4    Intake/Output Summary (Last 24 hours) at 3/4/2023 1231  Last data filed at 3/4/2023 0800  Gross per 24 hour   Intake 1798 ml   Output 0 ml   Net 1798 ml       Physical Exam:   Constitutional: Awake, alert, NAD, chronically ill-appearing, overweight  HEENT: Sclera anicteric, no conjunctival injection, AT/NC  Neck: Supple, no carotid bruit, trachea at midline, no JVD  Respiratory: Diffuse crackles in bases, nonlabored on 4 L/min  Cardiovascular: Regular, bradycardic, 2/6M  Gastrointestinal: BS +, soft, nontender and nondistended  : No palpable bladder  Ext: +2-3 edema both lower legs extending to hips, no clubbing or cyanosis  Psychiatric: Appropriate affect, cooperative, oriented  Neurologic:  moving all extremities, normal speech and mental status  Skin: Warm and dry       Scheduled Meds:     calcium gluconate, 1 g, Intravenous, Once  doxycycline, 100 mg, Intravenous, Q12H  furosemide, 40 mg, Intravenous, Once  heparin (porcine), 5,000 Units, Subcutaneous, Q12H  insulin lispro, 0-7 Units, Subcutaneous, Q4H  ketorolac, 1 drop, Left Eye, 4x Daily  sodium chloride, 10 mL, Intravenous, Q12H      IV Meds:   DOPamine, 2-20 mcg/kg/min, Last Rate: 10 mcg/kg/min (03/04/23 1132)  sodium chloride, 100 mL/hr, Last Rate: 100 mL/hr (03/04/23 3961)        Results Reviewed:   I have personally reviewed the results from the time of  this admission to 3/4/2023 12:31 EST     Lab Results   Component Value Date    GLUCOSE 70 03/04/2023    CALCIUM 9.1 03/04/2023     03/04/2023    K 5.9 (H) 03/04/2023    CO2 22.1 03/04/2023     03/04/2023    BUN 81 (H) 03/04/2023    CREATININE 1.22 (H) 03/04/2023    EGFRIFAFRI 88 11/24/2021    EGFRIFNONA 77 11/24/2021    BCR 66.4 (H) 03/04/2023    ANIONGAP 9.9 03/04/2023      Lab Results   Component Value Date    MG 2.5 (H) 03/04/2023    PHOS 4.3 03/04/2023    ALBUMIN 4.1 03/04/2023           Assessment / Plan     ASSESSMENT:  1.  FRANCISCA, nonoliguric, multifactorial:  hypotension and bradycardia limiting renal perfusion; urinary retention; impaired renal autoregulation due to benazepril.  Has volume excess by exam, peripheral and central.  Hyperkalemia due to FRANCISCA and benazepril; normal anion gap.  Divide UA  2.  Junctional bradycardia, persisting even with potassium <6.  Has known PAF  3.  Profound lower extremity edema  4.  Weight loss of 50 pounds over the past 1-2 years  5.  Pulmonary hypertension and untreated SALAS   6.  Hypotension      PLAN:  1.  Calcium gluconate IV  2.  Furosemide 40 mg IV to facilitate potassium excretion  3.  FENa  4.  Will give dose of Lokelma if/when she is allowed to take PO  5.  Re-check potassium later today    Thank you for involving us in the care of Tamara Singh.  Please feel free to call with any questions.    Varun Cai MD  03/04/23  12:31 EST    [facility]  [phone]      Please note that portions of this note were completed with a voice recognition program.

## 2023-03-04 NOTE — CONSULTS
[facility] Consult Note      Patient Name: Tamara Singh  : 1940  MRN: [id number]  Primary Care Physician:  Yane Lama APRN  Referring Physician: Efe Stanley MD  Date of admission: 3/3/2023    Subjective     Reason for Consult:  FRANCISCA and hyperK    HPI:   Tamara Singh is a 83 y.o. female with well-preserved renal function at baseline, admitted 3/3 for further evaluation of generalized weakness and shortness of breath.  Was found to have a junctional rhythm as low as 27 bpm, severe hypothermia, and chest x-ray with bilateral pleural effusions and ground-glass opacities.  Initial SCR 1.3 with potassium 5.6; more recent values are 1.2 and 5.9, respectively.  Full PMH outlined below; pertinent is hypertension on benazepril; pulmonary hypertension; PAF on AC; and untreated SALAS.  Currently on dopamine to maintain heart rate; 1 amp of calcium gluconate given earlier.  Has required already several I/O catheterizations due to urinary retention since her arrival yesterday.    · Despite developing significant lower extremity edema over the last month or 2, she reports weight loss of 50 pounds over the last 1 year  · States appetite is mediocre; no N/V  · No urinary complaints other than recent decline in urine volumes over the past few days  · Describes orthostatic symptoms over the last few weeks; on benazepril at home    Review of Systems:   14 point review of systems is otherwise negative except for mentioned above on HPI    Personal History     Past Medical History:   Diagnosis Date   • Abnormal ECG 2021   • Anxiety    • Arthritis    • Asthma     Used to use inhaler but dont anymore.   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   • Cholelithiasis    • Coronary artery disease    • Hypertension    • Legally blind    • Low back pain    • Macular degeneration    • Obesity    • Pulmonary hypertension (HCC)    • Pulmonary hypertension (HCC)    • Sleep  apnea    • Urinary tract infection    • Visual impairment     macular degeneration       Past Surgical History:   Procedure Laterality Date   • CHOLECYSTECTOMY  1974       Family History: family history includes Arthritis in her brother; Asthma in her brother; Heart attack (age of onset: 70) in her brother; Heart attack (age of onset: 86) in her mother; Heart disease (age of onset: 80) in her mother; Heart failure in her father and mother; Hyperlipidemia in her brother; Hypertension in her mother; Kidney disease in her sister; Stroke in her mother; Thyroid disease in her brother.    Social History:  reports that she has never smoked. She has never used smokeless tobacco. She reports that she does not drink alcohol and does not use drugs.    Home Medications:  Prior to Admission medications    Medication Sig Start Date End Date Taking? Authorizing Provider   amLODIPine-benazepril (LOTREL) 10-40 MG per capsule Take 1 capsule by mouth Daily. 3/1/23   Yane Lama APRN   carisoprodol (SOMA) 350 MG tablet Take 1 tablet by mouth 4 (Four) Times a Day As Needed for Muscle Spasms. 6/17/22   Dawson Sauceda MD   furosemide (Lasix) 40 MG tablet Take 1 tablet by mouth Daily. 6/17/22   Dawson Sauceda MD   HYDROcodone-acetaminophen (NORCO) 5-325 MG per tablet Take 1 tablet by mouth Every 4 (Four) Hours As Needed for Moderate Pain  or Severe Pain . 6/2/22   Dawson Sauceda MD   hydrOXYzine (ATARAX) 10 MG tablet Take 1 tablet by mouth At Night As Needed for Anxiety. 1/13/23   Yane Lama APRN   ketorolac (ACULAR) 0.5 % ophthalmic solution INSTILL 1 DROP INTO LEFT EYE 4 TIMES A DAY 3/13/19   Lang Cote MD   multivitamins-minerals (PRESERVISION AREDS 2) capsule capsule Take 1 capsule by mouth 2 (Two) Times a Day. 10/22/17   Lang Cote MD   PROAIR  (90 BASE) MCG/ACT inhaler INHALE 2 PUFFS BY MOUTH 4 TIMES DAILY AS NEEDED 2/5/16   Dawson Sauceda MD   rivaroxaban (Xarelto) 20 MG tablet Take 1  tablet by mouth Daily. 3/25/22   Evens Miller III, MD   silver sulfadiazine (SILVADENE, SSD) 1 % cream Apply  topically to the appropriate area as directed 2 (Two) Times a Day. *CM:I87.2 6/30/22   Dawson Sauceda MD   Vitamin D, Cholecalciferol, 50 MCG (2000 UT) capsule Take 1 tablet by mouth Daily.    Provider, MD Lang       Allergies:  Allergies   Allergen Reactions   • Moxifloxacin    • Penicillins        Objective     Vitals:   Temp:  [90.5 °F (32.5 °C)-97.9 °F (36.6 °C)] 97.9 °F (36.6 °C)  Heart Rate:  [27-66] 55  Resp:  [14-16] 16  BP: ()/() 123/45  Flow (L/min):  [2-4] 4    Intake/Output Summary (Last 24 hours) at 3/4/2023 1123  Last data filed at 3/4/2023 0800  Gross per 24 hour   Intake 1798 ml   Output 0 ml   Net 1798 ml       Physical Exam:   Constitutional: Awake, alert, NAD, chronically ill-appearing, overweight  HEENT: Sclera anicteric, no conjunctival injection, AT/NC  Neck: Supple, no carotid bruit, trachea at midline, no JVD  Respiratory: Diffuse crackles in bases, nonlabored on 4 L/min  Cardiovascular: Regular, bradycardic, 2/6M  Gastrointestinal: BS +, soft, nontender and nondistended  : No palpable bladder  Ext: +2-3 edema both lower legs extending to hips, no clubbing or cyanosis  Psychiatric: Appropriate affect, cooperative, oriented  Neurologic:  moving all extremities, normal speech and mental status  Skin: Warm and dry       Scheduled Meds:     doxycycline, 100 mg, Intravenous, Q12H  heparin (porcine), 5,000 Units, Subcutaneous, Q12H  insulin lispro, 0-7 Units, Subcutaneous, Q4H  ketorolac, 1 drop, Left Eye, 4x Daily  sodium chloride, 10 mL, Intravenous, Q12H      IV Meds:   DOPamine, 2-20 mcg/kg/min, Last Rate: 7.5 mcg/kg/min (03/04/23 0503)  sodium chloride, 100 mL/hr, Last Rate: 100 mL/hr (03/04/23 1115)        Results Reviewed:   I have personally reviewed the results from the time of this admission to 3/4/2023 11:23 EST     Lab Results   Component Value Date     GLUCOSE 70 03/04/2023    CALCIUM 9.1 03/04/2023     03/04/2023    K 5.9 (H) 03/04/2023    CO2 22.1 03/04/2023     03/04/2023    BUN 81 (H) 03/04/2023    CREATININE 1.22 (H) 03/04/2023    EGFRIFAFRI 88 11/24/2021    EGFRIFNONA 77 11/24/2021    BCR 66.4 (H) 03/04/2023    ANIONGAP 9.9 03/04/2023      Lab Results   Component Value Date    MG 2.5 (H) 03/04/2023    PHOS 4.3 03/04/2023    ALBUMIN 4.1 03/04/2023           Assessment / Plan     ASSESSMENT:  1.  FRANCISCA, nonoliguric, multifactorial:  hypotension and bradycardia limiting renal perfusion; urinary retention; impaired renal autoregulation due to benazepril.  Has volume excess by exam, peripheral and central.  Hyperkalemia due to FRANCISCA and benazepril; normal anion gap.  2.  Junctional bradycardia, persisting even with potassium <6.  Has known PAF  3.  Profound lower extremity edema  4.  Weight loss of 50 pounds over the past 1-2 years  5.  Pulmonary hypertension and untreated SALAS   6.  Hypotension:  amlodipine-benazepril already stopped      PLAN:  1.  Calcium gluconate IV  2.  Furosemide 40 mg IV to facilitate potassium excretion  3.  Cardiology evaluation pending  4.  We will give dose of Lokelma if/when she is allowed to take PO  5.  Re-check potassium later today    Thank you for involving us in the care of Tamara Singh.  Please feel free to call with any questions.    Varun Cai MD  03/04/23  11:23 [facility]    [facility]  [phone]      Please note that portions of this note were completed with a voice recognition program.

## 2023-03-05 LAB
ALBUMIN SERPL-MCNC: 3.7 G/DL (ref 3.5–5.2)
ANION GAP SERPL CALCULATED.3IONS-SCNC: 9.4 MMOL/L (ref 5–15)
AORTIC DIMENSIONLESS INDEX: 0.7 (DI)
BASOPHILS # BLD AUTO: 0.01 10*3/MM3 (ref 0–0.2)
BASOPHILS NFR BLD AUTO: 0.2 % (ref 0–1.5)
BH CV ECHO MEAS - ACS: 1.89 CM
BH CV ECHO MEAS - AO MAX PG: 33.5 MMHG
BH CV ECHO MEAS - AO MEAN PG: 14.3 MMHG
BH CV ECHO MEAS - AO V2 MAX: 289.6 CM/SEC
BH CV ECHO MEAS - AO V2 VTI: 58.6 CM
BH CV ECHO MEAS - AVA(I,D): 2.13 CM2
BH CV ECHO MEAS - EDV(CUBED): 126.3 ML
BH CV ECHO MEAS - EDV(MOD-SP2): 129 ML
BH CV ECHO MEAS - EDV(MOD-SP4): 103 ML
BH CV ECHO MEAS - EF(MOD-BP): 74.9 %
BH CV ECHO MEAS - EF(MOD-SP2): 72.1 %
BH CV ECHO MEAS - EF(MOD-SP4): 76.7 %
BH CV ECHO MEAS - ESV(CUBED): 13.3 ML
BH CV ECHO MEAS - ESV(MOD-SP2): 36 ML
BH CV ECHO MEAS - ESV(MOD-SP4): 24 ML
BH CV ECHO MEAS - FS: 52.7 %
BH CV ECHO MEAS - IVS/LVPW: 0.61 CM
BH CV ECHO MEAS - IVSD: 0.71 CM
BH CV ECHO MEAS - LAT PEAK E' VEL: 11.1 CM/SEC
BH CV ECHO MEAS - LV DIASTOLIC VOL/BSA (35-75): 52.9 CM2
BH CV ECHO MEAS - LV MASS(C)D: 169.3 GRAMS
BH CV ECHO MEAS - LV MAX PG: 19.9 MMHG
BH CV ECHO MEAS - LV MEAN PG: 8.8 MMHG
BH CV ECHO MEAS - LV SYSTOLIC VOL/BSA (12-30): 12.3 CM2
BH CV ECHO MEAS - LV V1 MAX: 223.1 CM/SEC
BH CV ECHO MEAS - LV V1 VTI: 43.6 CM
BH CV ECHO MEAS - LVIDD: 5 CM
BH CV ECHO MEAS - LVIDS: 2.37 CM
BH CV ECHO MEAS - LVOT AREA: 2.9 CM2
BH CV ECHO MEAS - LVOT DIAM: 1.91 CM
BH CV ECHO MEAS - LVPWD: 1.17 CM
BH CV ECHO MEAS - MED PEAK E' VEL: 9.1 CM/SEC
BH CV ECHO MEAS - MV A DUR: 0.16 SEC
BH CV ECHO MEAS - MV A MAX VEL: 93.2 CM/SEC
BH CV ECHO MEAS - MV DEC SLOPE: 988.6 CM/SEC2
BH CV ECHO MEAS - MV DEC TIME: 250 MSEC
BH CV ECHO MEAS - MV E MAX VEL: 174 CM/SEC
BH CV ECHO MEAS - MV E/A: 1.87
BH CV ECHO MEAS - MV MAX PG: 16.3 MMHG
BH CV ECHO MEAS - MV MEAN PG: 4.3 MMHG
BH CV ECHO MEAS - MV P1/2T: 62.1 MSEC
BH CV ECHO MEAS - MV V2 VTI: 59.3 CM
BH CV ECHO MEAS - MVA(P1/2T): 3.5 CM2
BH CV ECHO MEAS - MVA(VTI): 2.11 CM2
BH CV ECHO MEAS - PA ACC TIME: 0.09 SEC
BH CV ECHO MEAS - PA PR(ACCEL): 37.4 MMHG
BH CV ECHO MEAS - PA V2 MAX: 154.7 CM/SEC
BH CV ECHO MEAS - PULM A REVS DUR: 0.17 SEC
BH CV ECHO MEAS - PULM A REVS VEL: 22.7 CM/SEC
BH CV ECHO MEAS - PULM DIAS VEL: 71.7 CM/SEC
BH CV ECHO MEAS - PULM S/D: 0.73
BH CV ECHO MEAS - PULM SYS VEL: 52.4 CM/SEC
BH CV ECHO MEAS - RAP SYSTOLE: 3 MMHG
BH CV ECHO MEAS - RV MAX PG: 4.8 MMHG
BH CV ECHO MEAS - RV V1 MAX: 109.3 CM/SEC
BH CV ECHO MEAS - RV V1 VTI: 23.5 CM
BH CV ECHO MEAS - RVSP: 48.7 MMHG
BH CV ECHO MEAS - SI(MOD-SP2): 47.8 ML/M2
BH CV ECHO MEAS - SI(MOD-SP4): 40.6 ML/M2
BH CV ECHO MEAS - SV(LVOT): 125.1 ML
BH CV ECHO MEAS - SV(MOD-SP2): 93 ML
BH CV ECHO MEAS - SV(MOD-SP4): 79 ML
BH CV ECHO MEAS - TAPSE (>1.6): 3 CM
BH CV ECHO MEAS - TR MAX PG: 45.7 MMHG
BH CV ECHO MEAS - TR MAX VEL: 338 CM/SEC
BH CV ECHO MEASUREMENTS AVERAGE E/E' RATIO: 17.23
BH CV XLRA - RV BASE: 3.7 CM
BH CV XLRA - RV LENGTH: 9.2 CM
BH CV XLRA - RV MID: 2.8 CM
BH CV XLRA - TDI S': 17.5 CM/SEC
BUN SERPL-MCNC: 53 MG/DL (ref 8–23)
BUN/CREAT SERPL: 50 (ref 7–25)
CALCIUM SPEC-SCNC: 9.5 MG/DL (ref 8.6–10.5)
CHLORIDE SERPL-SCNC: 107 MMOL/L (ref 98–107)
CO2 SERPL-SCNC: 21.6 MMOL/L (ref 22–29)
CREAT SERPL-MCNC: 1.06 MG/DL (ref 0.57–1)
DEPRECATED RDW RBC AUTO: 58.8 FL (ref 37–54)
EGFRCR SERPLBLD CKD-EPI 2021: 52.6 ML/MIN/1.73
EOSINOPHIL # BLD AUTO: 0.05 10*3/MM3 (ref 0–0.4)
EOSINOPHIL NFR BLD AUTO: 1 % (ref 0.3–6.2)
ERYTHROCYTE [DISTWIDTH] IN BLOOD BY AUTOMATED COUNT: 18 % (ref 12.3–15.4)
GLUCOSE BLDC GLUCOMTR-MCNC: 101 MG/DL (ref 70–130)
GLUCOSE BLDC GLUCOMTR-MCNC: 88 MG/DL (ref 70–130)
GLUCOSE SERPL-MCNC: 95 MG/DL (ref 65–99)
HCT VFR BLD AUTO: 32.3 % (ref 34–46.6)
HGB BLD-MCNC: 10.9 G/DL (ref 12–15.9)
IMM GRANULOCYTES # BLD AUTO: 0.02 10*3/MM3 (ref 0–0.05)
IMM GRANULOCYTES NFR BLD AUTO: 0.4 % (ref 0–0.5)
LEFT ATRIUM VOLUME INDEX: 34.2 ML/M2
LYMPHOCYTES # BLD AUTO: 1.1 10*3/MM3 (ref 0.7–3.1)
LYMPHOCYTES NFR BLD AUTO: 21.4 % (ref 19.6–45.3)
MAGNESIUM SERPL-MCNC: 2.1 MG/DL (ref 1.6–2.4)
MAXIMAL PREDICTED HEART RATE: 138 BPM
MCH RBC QN AUTO: 30.2 PG (ref 26.6–33)
MCHC RBC AUTO-ENTMCNC: 33.7 G/DL (ref 31.5–35.7)
MCV RBC AUTO: 89.5 FL (ref 79–97)
MONOCYTES # BLD AUTO: 0.78 10*3/MM3 (ref 0.1–0.9)
MONOCYTES NFR BLD AUTO: 15.2 % (ref 5–12)
NEUTROPHILS NFR BLD AUTO: 3.18 10*3/MM3 (ref 1.7–7)
NEUTROPHILS NFR BLD AUTO: 61.8 % (ref 42.7–76)
NRBC BLD AUTO-RTO: 0.2 /100 WBC (ref 0–0.2)
PHOSPHATE SERPL-MCNC: 4 MG/DL (ref 2.5–4.5)
PLATELET # BLD AUTO: 204 10*3/MM3 (ref 140–450)
PMV BLD AUTO: 9.4 FL (ref 6–12)
POTASSIUM SERPL-SCNC: 5 MMOL/L (ref 3.5–5.2)
QT INTERVAL: 434 MS
RBC # BLD AUTO: 3.61 10*6/MM3 (ref 3.77–5.28)
SINUS: 2.8 CM
SODIUM SERPL-SCNC: 138 MMOL/L (ref 136–145)
STRESS TARGET HR: 117 BPM
WBC NRBC COR # BLD: 5.14 10*3/MM3 (ref 3.4–10.8)

## 2023-03-05 PROCEDURE — 25010000002 HEPARIN (PORCINE) PER 1000 UNITS

## 2023-03-05 PROCEDURE — 85025 COMPLETE CBC W/AUTO DIFF WBC: CPT

## 2023-03-05 PROCEDURE — 82962 GLUCOSE BLOOD TEST: CPT

## 2023-03-05 PROCEDURE — 25010000002 FUROSEMIDE PER 20 MG: Performed by: INTERNAL MEDICINE

## 2023-03-05 PROCEDURE — 83735 ASSAY OF MAGNESIUM: CPT

## 2023-03-05 PROCEDURE — 36415 COLL VENOUS BLD VENIPUNCTURE: CPT

## 2023-03-05 PROCEDURE — 99233 SBSQ HOSP IP/OBS HIGH 50: CPT | Performed by: INTERNAL MEDICINE

## 2023-03-05 PROCEDURE — 80069 RENAL FUNCTION PANEL: CPT | Performed by: INTERNAL MEDICINE

## 2023-03-05 PROCEDURE — 93005 ELECTROCARDIOGRAM TRACING: CPT

## 2023-03-05 PROCEDURE — 25010000002 DOPAMINE PER 40 MG

## 2023-03-05 PROCEDURE — 93010 ELECTROCARDIOGRAM REPORT: CPT | Performed by: INTERNAL MEDICINE

## 2023-03-05 RX ORDER — FUROSEMIDE 10 MG/ML
20 INJECTION INTRAMUSCULAR; INTRAVENOUS ONCE
Status: COMPLETED | OUTPATIENT
Start: 2023-03-05 | End: 2023-03-05

## 2023-03-05 RX ADMIN — DOXYCYCLINE 100 MG: 100 INJECTION, POWDER, LYOPHILIZED, FOR SOLUTION INTRAVENOUS at 06:23

## 2023-03-05 RX ADMIN — Medication 10 ML: at 09:35

## 2023-03-05 RX ADMIN — HEPARIN SODIUM 5000 UNITS: 5000 INJECTION INTRAVENOUS; SUBCUTANEOUS at 20:49

## 2023-03-05 RX ADMIN — FUROSEMIDE 20 MG: 10 INJECTION, SOLUTION INTRAMUSCULAR; INTRAVENOUS at 13:39

## 2023-03-05 RX ADMIN — SODIUM CHLORIDE 100 ML/HR: 9 INJECTION, SOLUTION INTRAVENOUS at 09:33

## 2023-03-05 RX ADMIN — KETOROLAC TROMETHAMINE 1 DROP: 0.5 SOLUTION OPHTHALMIC at 13:39

## 2023-03-05 RX ADMIN — KETOROLAC TROMETHAMINE 1 DROP: 0.5 SOLUTION OPHTHALMIC at 20:50

## 2023-03-05 RX ADMIN — DOPAMINE HYDROCHLORIDE IN DEXTROSE 7.5 MCG/KG/MIN: 1.6 INJECTION, SOLUTION INTRAVENOUS at 01:39

## 2023-03-05 RX ADMIN — HEPARIN SODIUM 5000 UNITS: 5000 INJECTION INTRAVENOUS; SUBCUTANEOUS at 09:56

## 2023-03-05 RX ADMIN — DOXYCYCLINE 100 MG: 100 INJECTION, POWDER, LYOPHILIZED, FOR SOLUTION INTRAVENOUS at 15:02

## 2023-03-05 RX ADMIN — DOPAMINE HYDROCHLORIDE IN DEXTROSE 2.5 MCG/KG/MIN: 1.6 INJECTION, SOLUTION INTRAVENOUS at 15:02

## 2023-03-05 RX ADMIN — KETOROLAC TROMETHAMINE 1 DROP: 0.5 SOLUTION OPHTHALMIC at 09:15

## 2023-03-05 RX ADMIN — Medication 10 ML: at 13:41

## 2023-03-05 RX ADMIN — Medication 10 ML: at 20:50

## 2023-03-05 NOTE — PROGRESS NOTES
[facility] INPATIENT PROGRESS NOTE         [facility] CARDIAC INTENSIVE CARE    3/5/2023      PATIENT IDENTIFICATION:  Name: Tamara Singh ADMIT: 3/3/2023   : 1940  PCP: Yane Lama APRN    MRN: [id number] LOS: 1 days   AGE/SEX: 83 y.o. female  ROOM: [facility]                     LOS 1    Reason for visit: Traumatic bradycardia      SUBJECTIVE:      On dopamine.  Weaning that down.  Tolerating diet.  No new issues.      Objective   OBJECTIVE:    Vital Sign Min/Max for last 24 hours  Temp  Min: 97.4 °F (36.3 °C)  Max: 98.6 °F (37 °C)   BP  Min: 102/65  Max: 134/60   Pulse  Min: 48  Max: 72   Resp  Min: 12  Max: 16   SpO2  Min: 94 %  Max: 100 %   No data recorded   Weight  Min: 96.1 kg (211 lb 13.8 oz)  Max: 96.1 kg (211 lb 13.8 oz)                         Body mass index is 38.75 kg/m².    Intake/Output Summary (Last 24 hours) at 3/5/2023 1029  Last data filed at 3/5/2023 0933  Gross per 24 hour   Intake 4245.15 ml   Output 3400 ml   Net 845.15 ml         Exam:  GEN:  No distress, appears stated age  EYES:   PERRL, anicteric sclerae  ENT:    External ears/nose normal, OP clear  NECK:  No adenopathy, midline trachea  LUNGS: Normal chest on inspection, palpation and auscultation  CV:  Normal S1S2, without murmur  ABD:  Nontender, nondistended, no hepatosplenomegaly, +BS  EXT:  No edema.  No cyanosis or clubbing.  No mottling and normal cap refill.    Assessment     Scheduled meds:  doxycycline, 100 mg, Intravenous, Q12H  heparin (porcine), 5,000 Units, Subcutaneous, Q12H  insulin lispro, 0-7 Units, Subcutaneous, Q4H  ketorolac, 1 drop, Left Eye, 4x Daily  sodium chloride, 10 mL, Intravenous, Q12H      IV meds:                      DOPamine, 2-20 mcg/kg/min, Last Rate: 4.5 mcg/kg/min (23 0754)  sodium chloride, 100 mL/hr, Last Rate: 100 mL/hr (23 0933)      Data Review:  Results from last 7 days   Lab Units 23  0611 23  1541 23  0323 23  0132 23  2223   SODIUM  mmol/L 138  --  138 136 134*   POTASSIUM mmol/L 5.0 5.8* 5.9* 5.6* 6.1*   CHLORIDE mmol/L 107  --  106 105 100   CO2 mmol/L 21.6*  --  22.1 22.1 20.0*   BUN mg/dL 53*  --  81* 79* 86*   CREATININE mg/dL 1.06*  --  1.22* 1.26* 1.64*   GLUCOSE mg/dL 95  --  70 24* 134*   CALCIUM mg/dL 9.5  --  9.1 9.1 9.4         Estimated Creatinine Clearance: 44.2 mL/min (A) (by C-G formula based on SCr of 1.06 mg/dL (H)).  Results from last 7 days   Lab Units 03/05/23  0611 03/04/23  0323 03/04/23  0132 03/03/23  2223   WBC 10*3/mm3 5.14 4.86 5.49 4.74   HEMOGLOBIN g/dL 10.9* 10.6* 10.7* 11.6*   PLATELETS 10*3/mm3 204 199 184 223     Results from last 7 days   Lab Units 03/03/23  2223   INR  2.56*     Results from last 7 days   Lab Units 03/03/23  2223   ALT (SGPT) U/L 42*   AST (SGOT) U/L 40*         Results from last 7 days   Lab Units 03/04/23  0323 03/04/23  0027   PROCALCITONIN ng/mL 0.11 0.09   LACTATE mmol/L 1.0  --          Glucose   Date/Time Value Ref Range Status   03/05/2023 0616 88 70 - 130 mg/dL Final     Comment:     Meter: [id number] : JERRY Sanchez RN   03/04/2023 2054 114 70 - 130 mg/dL Final     Comment:     Meter: [id number] : [id number] Mook Mixon RN   03/04/2023 0717 92 70 - 130 mg/dL Final     Comment:     Meter: [id number] : [id number] Enrique DOOLEY   03/04/2023 0533 95 70 - 130 mg/dL Final     Comment:     Meter: [id number] : [id number] Alec Montoya RN   03/04/2023 0318 79 70 - 130 mg/dL Final     Comment:     Meter: [id number] : [id number] Alec Montoya RN     Chest x-ray 3/3 reviewed    CT chest 3/4 reviewed: Groundglass infiltrates suggestive of infectious versus inflammatory process.  Unable to rule out neoplastic.  Large pulmonary AVM left base.          Microbiology reviewed              Active Hospital Problems    Diagnosis  POA   • **Symptomatic bradycardia [R00.1]  Yes      Resolved Hospital Problems   No resolved problems to display.          ASSESSMENT:    1. Symptomatic bradycardia  2. Hyperkalemia, potassium 6.1  3. Acute kidney injury  4. Hypothermia  5. Stable left lower lobe AVM  6. Right multifocal infiltrate/pneumonia  7. Obstructive sleep apnea, not currently on PAP therapy  8. Atrial fibrillation  9. Grade 2 diastolic dysfunction  10. Moderate pulm hypertension  11. Chronic anticoagulation with Xarelto  12. Chronic back pain on narcotics  13. Obesity (BMI 38)          PLAN:    Titrating dopamine for symptomatic bradycardia.  2D echo shows an ejection fraction of 75% with grade 2 diastolic dysfunction and moderate pulm hypertension with right ventricular systolic pressure 48.7 mmHg.    Cardiology input appreciated.  Temperature improved: Unclear etiology of her initial hypothermia.  Continue antibiotic.  DVT prophylaxis.    Discussed with family at bedside.            CCT: 32 min    Jose Holcomb MD  Pulmonary and Critical Care Medicine  [facility]  3/5/2023    10:29 EST

## 2023-03-05 NOTE — PROGRESS NOTES
[facility]  Progress note: 3/5/2023    Patient Identification:  Name:Tamara Singh  Age:83 y.o.  Sex: female  :  1940  MRN: [id number]           CC:  Bradycardia, atrial fibrillation    Interval history:  Remains in atrial fibrillation rate 60 to 70 bpm.  Remains on dopamine.  Significant diuresis overnight.  A.m. labs pending.  ECHO with hyperdynamic left ventricle, diastolic dysfunction, moderate pulmonary hypertension. BP still low and on Dopamine with subsequent HR 50-60's now with treatment also of pneumonia and hypokalemia.  She is resting quietly no chest pain or shortness of breath.    Vital Signs:   Temp:  [97.6 °F (36.4 °C)-98.6 °F (37 °C)] 98.3 °F (36.8 °C)  Heart Rate:  [48-72] 72  Resp:  [16] 16  BP: (118-134)/(39-60) 134/60    Intake/Output Summary (Last 24 hours) at 3/5/2023 0702  Last data filed at 3/5/2023 0623  Gross per 24 hour   Intake 700 ml   Output 4100 ml   Net -3400 ml       Physical Examination:    General Appearance No acute distress   Neck No adenopathy, supple, trachea midline, no thyromegaly, no carotid bruit, no JVD   Lungs Clear to auscultation,respirations regular, even and unlabored   Heart Irregular rhythm and normal rate, normal S1 and S2, no murmur, no gallop, no rub, no click   Chest wall No abnormalities observed   Abdomen Normal bowel sounds, no masses, no hepatomegaly, soft   Extremities Moves all extremities well, no edema, no cyanosis, no redness   Neurological Alert and oriented x 3     Lab Review:  Personally reviewed the labs, radiology imaging and other cardiac procedures.   Results from last 7 days   Lab Units 23  1541 23  0323 23  0132 23  2223   SODIUM mmol/L  --  138   < > 134*   POTASSIUM mmol/L 5.8* 5.9*   < > 6.1*   CHLORIDE mmol/L  --  106   < > 100   CO2 mmol/L  --  22.1   < > 20.0*   BUN mg/dL  --  81*   < > 86*   CREATININE mg/dL  --  1.22*   < > 1.64*   CALCIUM mg/dL  --  9.1   < > 9.4   BILIRUBIN mg/dL  --   --   --   0.5   ALK PHOS U/L  --   --   --  126*   ALT (SGPT) U/L  --   --   --  42*   AST (SGOT) U/L  --   --   --  40*   GLUCOSE mg/dL  --  70   < > 134*    < > = values in this interval not displayed.     Results from last 7 days   Lab Units 03/04/23  0158 03/03/23  2223   HSTROP T ng/L 33* 36*     Results from last 7 days   Lab Units 03/05/23  0611 03/04/23  0323 03/04/23  0132   WBC 10*3/mm3 5.14 4.86 5.49   HEMOGLOBIN g/dL 10.9* 10.6* 10.7*   HEMATOCRIT % 32.3* 31.4* 31.6*   PLATELETS 10*3/mm3 204 199 184     Results from last 7 days   Lab Units 03/03/23  2223   INR  2.56*     Medication Review:   Meds reviewed  Scheduled Meds:doxycycline, 100 mg, Intravenous, Q12H  heparin (porcine), 5,000 Units, Subcutaneous, Q12H  insulin lispro, 0-7 Units, Subcutaneous, Q4H  ketorolac, 1 drop, Left Eye, 4x Daily  sodium chloride, 10 mL, Intravenous, Q12H      I personally viewed and interpreted the patient's EKG/Telemetry data    Assessment and Plan  1.  Bradycar  This mostly with likely due to hyperkalemia and hypothermia.  Was in sinus rhythm briefly and now in atrial fibrillation with reasonable rates on dopamine.  However still requiring dopamine for hypotension.  I am not sure how much is contributing to maintaining heart rate in the 50s.  With concomitant pneumonia would continue with supportive care and dopamine as needed for blood pressure control.  If blood pressure improves would wean dopamine and observe heart rate response.  If still bradycardic with adequate blood pressure therapy in the setting of pneumonia, then may need pacemaker.  We will ask EP service to review in a.m.  2.  Atrial fibrillation.  Appears to be more paroxysmal with brief sinus rhythm yesterday. Await more clarity with surface twelve-lead EKG.  Continue with Xarelto and avoidance of AV leatha blocker therapy.    3.  Hypothermia with hypotension, improving.    Resolved.  Additional recommendations per Dr. Holcomb  4.  Borderline abnormal troponin T.   Likely due to hypotension and renal insufficiency.  Echo with normal LV function without wall motion changes.  No anginal symptoms.  4.  Right sided pneumonia.  Recommendations per Dr. Holcomb.  Remains on antibiotic therapy  5.  Hyperkalemia.  A.m. labs pending.  6.  Large pulmonary venous shunt.  7.  Sleep apnea, on CPAP therapy  8. Acute renal failure  9.  Left bundle branch block back to nonspecific IVCD with treatment of hyperkalemia and hypothermia.      Mini Mcgee  3/5/182559:02 EST  35min spent in reviewing records, discussion and examination of the patient and discussion with other members of the patient's medical team.     Dictated utilizing Dragon dictation

## 2023-03-05 NOTE — PROGRESS NOTES
[facility] Progress Note      Patient Name: Tamara Singh  : 1940  MRN: [id number]  Primary Care Physician:  Yane Lama APRN  Date of admission: 3/3/2023    Subjective     Interval History:   Dopamine drip continues  UOP OP 4.1 L yesterday after IV Lasix 40 mg  Breathing is comfortable on 2 L/min appetite fine; no N/V  Appetite fine; no N/V    Review of Systems:   As noted above    Objective     Vitals:   Temp:  [97.4 °F (36.3 °C)-98.6 °F (37 °C)] 97.4 °F (36.3 °C)  Heart Rate:  [49-72] 51  Resp:  [12-16] 13  BP: (102-134)/(39-75) 118/58  Flow (L/min):  [2-4] 2    Intake/Output Summary (Last 24 hours) at 3/5/2023 1138  Last data filed at 3/5/2023 0933  Gross per 24 hour   Intake 4245.15 ml   Output 3400 ml   Net 845.15 ml       Physical Exam:    Constitutional: Awake, alert, NAD, chronically ill-appearing, overweight  HEENT: Sclera anicteric, no conjunctival injection, AT/NC  Neck: Supple, no carotid bruit, trachea at midline, no JVD  Respiratory: Diffuse crackles in bases, nonlabored on 4 L/min  Cardiovascular: Irregularly irregular, bradycardic, 2/6M  Gastrointestinal: BS +, soft, nontender and nondistended  : No palpable bladder  Ext: +2-3 edema both lower legs extending to hips, no clubbing or cyanosis  Psychiatric: Appropriate affect, cooperative, oriented  Neurologic:  moving all extremities, normal speech and mental status  Skin: Warm and dry     Scheduled Meds:     doxycycline, 100 mg, Intravenous, Q12H  heparin (porcine), 5,000 Units, Subcutaneous, Q12H  insulin lispro, 0-7 Units, Subcutaneous, Q4H  ketorolac, 1 drop, Left Eye, 4x Daily  sodium chloride, 10 mL, Intravenous, Q12H      IV Meds:   DOPamine, 2-20 mcg/kg/min, Last Rate: 4.5 mcg/kg/min (23 0754)  sodium chloride, 100 mL/hr, Last Rate: 100 mL/hr (23 0933)        Results Reviewed:   I have personally reviewed the results from the time of this admission to 3/5/2023 11:38 EST     Results from  last 7 days   Lab Units 03/05/23  0611 03/04/23  1541 03/04/23  0323 03/04/23  0132 03/03/23  2223   SODIUM mmol/L 138  --  138 136 134*   POTASSIUM mmol/L 5.0 5.8* 5.9* 5.6* 6.1*   CHLORIDE mmol/L 107  --  106 105 100   CO2 mmol/L 21.6*  --  22.1 22.1 20.0*   BUN mg/dL 53*  --  81* 79* 86*   CREATININE mg/dL 1.06*  --  1.22* 1.26* 1.64*   CALCIUM mg/dL 9.5  --  9.1 9.1 9.4   BILIRUBIN mg/dL  --   --   --   --  0.5   ALK PHOS U/L  --   --   --   --  126*   ALT (SGPT) U/L  --   --   --   --  42*   AST (SGOT) U/L  --   --   --   --  40*   GLUCOSE mg/dL 95  --  70 24* 134*       Estimated Creatinine Clearance: 44.2 mL/min (A) (by C-G formula based on SCr of 1.06 mg/dL (H)).    Results from last 7 days   Lab Units 03/05/23  0611 03/04/23 0323 03/04/23 0132   MAGNESIUM mg/dL 2.1 2.5* 2.4   PHOSPHORUS mg/dL 4.0 4.3 4.2             Results from last 7 days   Lab Units 03/05/23  0611 03/04/23 0323 03/04/23 0132 03/03/23  2223   WBC 10*3/mm3 5.14 4.86 5.49 4.74   HEMOGLOBIN g/dL 10.9* 10.6* 10.7* 11.6*   PLATELETS 10*3/mm3 204 199 184 223       Results from last 7 days   Lab Units 03/03/23  2223   INR  2.56*       Assessment / Plan     ASSESSMENT:  1.  FRANCISCA, nonoliguric, improving and multifactorial:  hypotension and bradycardia limiting renal perfusion; urinary retention; impaired renal autoregulation due to benazepril.  Has volume excess by exam, peripheral and central.  Hyperkalemia, resolving, due to FRANCISCA and benazepril; normal anion gap.  [place] UA  2.  Junctional bradycardia and PAF   3.  Profound lower extremity edema  4.  Weight loss of 50 pounds over the past 1-2 years  5.  Pulmonary hypertension and untreated SALAS   6.  Hypotension  7.  Hypothermia  8.  Hypothyroidism    PLAN:  1.  Furosemide 20 g IV for 1 dose today  2.  Surveillance labs    Thank you for involving us in the care of Tamara LISA Singh.  Please feel free to call with any questions.    Varun Cai MD  03/05/23  11:38 EST    Nephrology  [facility]  [phone]    Please note that portions of this note were completed with a voice recognition program.

## 2023-03-05 NOTE — PROGRESS NOTES
Pulmonary/critical care progress note    Patient's heart rate normalized overnight with transition from junctional rhythm to atrial fibrillation, rate 60s-70s  She has a history of atrial fibrillation, is currently on subcutaneous heparin.    EKG ordered for this morning.  Patient remains on dopamine-defer titration/management to cardiology.    YISEL Barillas

## 2023-03-06 ENCOUNTER — TELEPHONE (OUTPATIENT)
Dept: INTERNAL MEDICINE | Age: 83
End: 2023-03-06
Payer: MEDICARE

## 2023-03-06 LAB
ALBUMIN SERPL-MCNC: 3.2 G/DL (ref 3.5–5.2)
ANION GAP SERPL CALCULATED.3IONS-SCNC: 10.9 MMOL/L (ref 5–15)
BUN SERPL-MCNC: 32 MG/DL (ref 8–23)
BUN/CREAT SERPL: 34.4 (ref 7–25)
CALCIUM SPEC-SCNC: 9.3 MG/DL (ref 8.6–10.5)
CHLORIDE SERPL-SCNC: 111 MMOL/L (ref 98–107)
CO2 SERPL-SCNC: 21.1 MMOL/L (ref 22–29)
CREAT SERPL-MCNC: 0.93 MG/DL (ref 0.57–1)
EGFRCR SERPLBLD CKD-EPI 2021: 61.5 ML/MIN/1.73
GLUCOSE BLDC GLUCOMTR-MCNC: 110 MG/DL (ref 70–130)
GLUCOSE BLDC GLUCOMTR-MCNC: 133 MG/DL (ref 70–130)
GLUCOSE BLDC GLUCOMTR-MCNC: 67 MG/DL (ref 70–130)
GLUCOSE BLDC GLUCOMTR-MCNC: 84 MG/DL (ref 70–130)
GLUCOSE SERPL-MCNC: 62 MG/DL (ref 65–99)
MAGNESIUM SERPL-MCNC: 1.7 MG/DL (ref 1.6–2.4)
PHOSPHATE SERPL-MCNC: 2.3 MG/DL (ref 2.5–4.5)
POTASSIUM SERPL-SCNC: 4.3 MMOL/L (ref 3.5–5.2)
QT INTERVAL: 469 MS
SODIUM SERPL-SCNC: 143 MMOL/L (ref 136–145)

## 2023-03-06 PROCEDURE — 82962 GLUCOSE BLOOD TEST: CPT

## 2023-03-06 PROCEDURE — 25010000002 HEPARIN (PORCINE) PER 1000 UNITS

## 2023-03-06 PROCEDURE — 99222 1ST HOSP IP/OBS MODERATE 55: CPT | Performed by: INTERNAL MEDICINE

## 2023-03-06 PROCEDURE — 83735 ASSAY OF MAGNESIUM: CPT | Performed by: INTERNAL MEDICINE

## 2023-03-06 PROCEDURE — 80069 RENAL FUNCTION PANEL: CPT | Performed by: INTERNAL MEDICINE

## 2023-03-06 PROCEDURE — 93010 ELECTROCARDIOGRAM REPORT: CPT | Performed by: STUDENT IN AN ORGANIZED HEALTH CARE EDUCATION/TRAINING PROGRAM

## 2023-03-06 PROCEDURE — 99232 SBSQ HOSP IP/OBS MODERATE 35: CPT | Performed by: INTERNAL MEDICINE

## 2023-03-06 PROCEDURE — 0 MAGNESIUM SULFATE 4 GM/100ML SOLUTION: Performed by: INTERNAL MEDICINE

## 2023-03-06 PROCEDURE — 93005 ELECTROCARDIOGRAM TRACING: CPT

## 2023-03-06 RX ORDER — FUROSEMIDE 40 MG/1
40 TABLET ORAL
Status: DISCONTINUED | OUTPATIENT
Start: 2023-03-06 | End: 2023-03-08 | Stop reason: HOSPADM

## 2023-03-06 RX ORDER — DOXYCYCLINE 100 MG/1
100 CAPSULE ORAL EVERY 12 HOURS SCHEDULED
Status: DISCONTINUED | OUTPATIENT
Start: 2023-03-06 | End: 2023-03-08 | Stop reason: HOSPADM

## 2023-03-06 RX ORDER — POTASSIUM CHLORIDE 750 MG/1
40 TABLET, FILM COATED, EXTENDED RELEASE ORAL AS NEEDED
Status: DISCONTINUED | OUTPATIENT
Start: 2023-03-06 | End: 2023-03-08 | Stop reason: HOSPADM

## 2023-03-06 RX ORDER — MAGNESIUM SULFATE HEPTAHYDRATE 40 MG/ML
2 INJECTION, SOLUTION INTRAVENOUS AS NEEDED
Status: DISCONTINUED | OUTPATIENT
Start: 2023-03-06 | End: 2023-03-08 | Stop reason: HOSPADM

## 2023-03-06 RX ORDER — POTASSIUM CHLORIDE 7.45 MG/ML
10 INJECTION INTRAVENOUS
Status: DISCONTINUED | OUTPATIENT
Start: 2023-03-06 | End: 2023-03-08 | Stop reason: HOSPADM

## 2023-03-06 RX ORDER — MAGNESIUM SULFATE HEPTAHYDRATE 40 MG/ML
4 INJECTION, SOLUTION INTRAVENOUS AS NEEDED
Status: DISCONTINUED | OUTPATIENT
Start: 2023-03-06 | End: 2023-03-08 | Stop reason: HOSPADM

## 2023-03-06 RX ORDER — POTASSIUM CHLORIDE 1.5 G/1.77G
40 POWDER, FOR SOLUTION ORAL AS NEEDED
Status: DISCONTINUED | OUTPATIENT
Start: 2023-03-06 | End: 2023-03-08 | Stop reason: HOSPADM

## 2023-03-06 RX ORDER — CALCIUM GLUCONATE 20 MG/ML
1 INJECTION, SOLUTION INTRAVENOUS AS NEEDED
Status: DISCONTINUED | OUTPATIENT
Start: 2023-03-06 | End: 2023-03-08 | Stop reason: HOSPADM

## 2023-03-06 RX ADMIN — DOXYCYCLINE 100 MG: 100 CAPSULE ORAL at 18:14

## 2023-03-06 RX ADMIN — KETOROLAC TROMETHAMINE 1 DROP: 0.5 SOLUTION OPHTHALMIC at 12:28

## 2023-03-06 RX ADMIN — SODIUM PHOSPHATE, MONOBASIC, MONOHYDRATE AND SODIUM PHOSPHATE, DIBASIC, ANHYDROUS 20 MMOL: 276; 142 INJECTION, SOLUTION INTRAVENOUS at 15:52

## 2023-03-06 RX ADMIN — FUROSEMIDE 40 MG: 40 TABLET ORAL at 18:14

## 2023-03-06 RX ADMIN — KETOROLAC TROMETHAMINE 1 DROP: 0.5 SOLUTION OPHTHALMIC at 08:32

## 2023-03-06 RX ADMIN — HEPARIN SODIUM 5000 UNITS: 5000 INJECTION INTRAVENOUS; SUBCUTANEOUS at 08:32

## 2023-03-06 RX ADMIN — KETOROLAC TROMETHAMINE 1 DROP: 0.5 SOLUTION OPHTHALMIC at 20:40

## 2023-03-06 RX ADMIN — RIVAROXABAN 20 MG: 20 TABLET, FILM COATED ORAL at 20:40

## 2023-03-06 RX ADMIN — Medication 10 ML: at 20:40

## 2023-03-06 RX ADMIN — KETOROLAC TROMETHAMINE 1 DROP: 0.5 SOLUTION OPHTHALMIC at 18:14

## 2023-03-06 RX ADMIN — Medication 10 ML: at 08:33

## 2023-03-06 RX ADMIN — DOXYCYCLINE 100 MG: 100 INJECTION, POWDER, LYOPHILIZED, FOR SOLUTION INTRAVENOUS at 03:54

## 2023-03-06 RX ADMIN — MAGNESIUM SULFATE HEPTAHYDRATE 4 G: 40 INJECTION, SOLUTION INTRAVENOUS at 15:52

## 2023-03-06 NOTE — PROGRESS NOTES
"Nutrition Services    Patient Name:  Tamara Singh  YOB: 1940  MRN: [id number]  Admit Date:  3/3/2023  Assessment Date:  03/06/23    Comment: Nutrition Screen for difficulty chewing/swallowing, decrease po per RN screen  Dx: slow heart rate, weakness, SOA, FRANCISCA, afib, obesity  Labs: Glu , phos 2.3  Pt on soft chew diet, Healthy Heart, Thin liquids with Boost Breeze  Pt ate some breakfast this am and drinking supplement. Food pref's obtained.    Will continue to follow clinical course and monitor nutritional needs.       CLINICAL NUTRITION ASSESSMENT      Reason for Assessment Nurse Admission Screen     Diagnosis/Problem   slow heart rate, weakness, SOA, FRANCISCA, afib, obesity   Medical/Surgical History Past Medical History:   Diagnosis Date   • Abnormal ECG 06/05/2021   • Anxiety    • Arthritis    • Asthma     Used to use inhaler but dont anymore.   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   • Cholelithiasis    • Coronary artery disease    • Hypertension    • Legally blind    • Low back pain    • Macular degeneration    • Obesity    • Pulmonary hypertension (HCC)    • Pulmonary hypertension (HCC)    • Sleep apnea    • Urinary tract infection    • Visual impairment     macular degeneration       Past Surgical History:   Procedure Laterality Date   • CHOLECYSTECTOMY  1974        Encounter Information        Nutrition History:   needs soft foods   Food Preferences: wants decaf coffee, will drink supplement   Supplements:    Factors Affecting Intake: chewing difficulty     Anthropometrics        Current Height  Current Weight  BMI kg/m2 Height: 157.5 cm (62\")  Weight: 96 kg (211 lb 10.3 oz) (03/06/23 0400)  Body mass index is 38.71 kg/m².   Adjusted BMI (if applicable)        Admission Weight        Ideal Body Weight (IBW) 50.1 kg   Adjusted IBW (if applicable)        Usual Body Weight (UBW) 210   Weight Change/Trend Stable x 6 mo       Weight History Wt Readings from Last 30 " Encounters:   03/06/23 0400 96 kg (211 lb 10.3 oz)   03/05/23 0300 96.1 kg (211 lb 13.8 oz)   03/04/23 0855 94.8 kg (209 lb)   03/04/23 0105 95 kg (209 lb 7 oz)   03/03/23 2216 90.7 kg (200 lb)   09/16/22 1241 93.4 kg (206 lb)   09/14/22 1015 99.2 kg (218 lb 11.1 oz)   06/17/22 1352 99.2 kg (218 lb 9.6 oz)   04/22/22 1012 94.8 kg (209 lb)   03/25/22 1322 93 kg (205 lb)   02/28/22 1307 92.8 kg (204 lb 9.6 oz)   11/24/21 1505 94.1 kg (207 lb 6.4 oz)   09/24/21 1317 95.3 kg (210 lb)   09/01/21 1453 97.1 kg (214 lb)   08/13/21 1333 99.8 kg (220 lb)   08/13/21 1130 99.8 kg (220 lb)   07/16/21 0942 97.5 kg (215 lb)   06/28/21 1508 97.7 kg (215 lb 6.4 oz)   06/25/21 0944 98 kg (216 lb)   06/05/21 2349 102 kg (225 lb)   05/07/21 1428 102 kg (225 lb)   01/25/21 1302 104 kg (229 lb 9.6 oz)   07/06/20 1314 110 kg (242 lb)   04/27/20 0859 112 kg (248 lb)   02/05/20 1256 115 kg (252 lb 12.8 oz)   01/13/20 1321 117 kg (258 lb)   10/18/19 1413 115 kg (253 lb 6.4 oz)   09/04/19 1045 119 kg (262 lb 3.2 oz)   07/30/19 1000 119 kg (262 lb)   07/19/19 1308 120 kg (264 lb)   06/20/19 1100 125 kg (276 lb)   04/17/19 1325 125 kg (276 lb 3.2 oz)   03/25/19 1340 124 kg (274 lb)   01/16/19 1413 128 kg (282 lb)           --  Tests/Procedures        Tests/Procedures No new tests/procedures     Labs       Pertinent Labs    Results from last 7 days   Lab Units 03/06/23  0602 03/05/23  0611 03/04/23  1541 03/04/23  0323 03/04/23  0132 03/03/23  2223   SODIUM mmol/L 143 138  --  138   < > 134*   POTASSIUM mmol/L 4.3 5.0 5.8* 5.9*   < > 6.1*   CHLORIDE mmol/L 111* 107  --  106   < > 100   CO2 mmol/L 21.1* 21.6*  --  22.1   < > 20.0*   BUN mg/dL 32* 53*  --  81*   < > 86*   CREATININE mg/dL 0.93 1.06*  --  1.22*   < > 1.64*   CALCIUM mg/dL 9.3 9.5  --  9.1   < > 9.4   BILIRUBIN mg/dL  --   --   --   --   --  0.5   ALK PHOS U/L  --   --   --   --   --  126*   ALT (SGPT) U/L  --   --   --   --   --  42*   AST (SGOT) U/L  --   --   --   --   --  40*    GLUCOSE mg/dL 62* 95  --  70   < > 134*    < > = values in this interval not displayed.     Results from last 7 days   Lab Units 03/06/23  0602 03/05/23  0611 03/04/23 0323   MAGNESIUM mg/dL 1.7 2.1 2.5*   PHOSPHORUS mg/dL 2.3* 4.0 4.3   HEMOGLOBIN g/dL  --  10.9* 10.6*   HEMATOCRIT %  --  32.3* 31.4*   WBC 10*3/mm3  --  5.14 4.86   ALBUMIN g/dL 3.2* 3.7 4.1     Results from last 7 days   Lab Units 03/05/23  0611 03/04/23  0323 03/04/23  0132 03/03/23 2223   INR   --   --   --  2.56*   PLATELETS 10*3/mm3 204 199 184 223     No results found for: COVID19  Lab Results   Component Value Date    HGBA1C 4.80 09/16/2022          Medications           Scheduled Medications doxycycline, 100 mg, Intravenous, Q12H  heparin (porcine), 5,000 Units, Subcutaneous, Q12H  ketorolac, 1 drop, Left Eye, 4x Daily  sodium chloride, 10 mL, Intravenous, Q12H       Infusions DOPamine, 2-20 mcg/kg/min, Last Rate: Stopped (03/05/23 2130)       PRN Medications •  acetaminophen **OR** acetaminophen  •  albuterol  •  dextrose  •  dextrose  •  glucagon (human recombinant)  •  hydrALAZINE  •  ondansetron  •  [COMPLETED] Insert Peripheral IV **AND** sodium chloride  •  sodium chloride  •  sodium chloride     Physical Findings          Physical Appearance obese   Oral/Mouth Cavity teeth missing   Edema  3+ (moderate)   Gastrointestinal normoactive   Skin  bruising   Tubes/Drains none   NFPE Not applicable at this time   --  Current Nutrition Orders & Evaluation of Intake       Oral Nutrition     Food Allergies NKFA   Current PO Diet Diet: Cardiac Diets, Diabetic Diets; Healthy Heart (2-3 Na+); Consistent Carbohydrate; Texture: Soft to Chew (NDD 3); Soft to Chew: Chopped Meat; Fluid Consistency: Thin (IDDSI 0)   Supplement Boost Breeze   PO Evaluation     % PO Intake Adequate intake this am for breakfast    # of Days Evaluated    --  PES STATEMENT / NUTRITION DIAGNOSIS      Nutrition Dx Problem  Problem: Biting/Chewing Difficulty  Etiology:  Factors Affecting Nutrition difficulty chewing  Signs/Symptoms: Report/Observation    Comment:    --  NUTRITION INTERVENTION / PLAN OF CARE      Intervention Goal(s) Maintain nutrition status, Reduce/improve symptoms, Tolerate PO  and PO intake goal %: 75         RD Intervention/Action Interview for preferences, Supplement provided and Follow Tx Progress     --      Monitor/Evaluation Per protocol   Discharge Plan/Needs Pending clinical course   Education Will instruct as appropriate   --    RD to follow per protocol.      Electronically signed by:  Kath Dietz RD  03/06/23 11:20 EST

## 2023-03-06 NOTE — PROGRESS NOTES
[facility] INPATIENT PROGRESS NOTE         [facility] CARDIAC INTENSIVE CARE    3/6/2023      PATIENT IDENTIFICATION:  Name: Tamara Singh ADMIT: 3/3/2023   : 1940  PCP: Yane Lama APRN    MRN: [id number] LOS: 2 days   AGE/SEX: 83 y.o. female  ROOM: [facility]                     LOS 2    Reason for visit: Traumatic bradycardia      SUBJECTIVE:      Resting comfortably.  Not requiring any supplemental oxygen.  Off dopamine drip and heart rate and blood pressure appear to be stable.  Transfer out of ICU to telemetry when okay with cardiology.      Objective   OBJECTIVE:    Vital Sign Min/Max for last 24 hours  Temp  Min: 97.4 °F (36.3 °C)  Max: 97.8 °F (36.6 °C)   BP  Min: 104/65  Max: 134/67   Pulse  Min: 44  Max: 79   Resp  Min: 14  Max: 15   SpO2  Min: 92 %  Max: 98 %   No data recorded   Weight  Min: 96 kg (211 lb 10.3 oz)  Max: 96 kg (211 lb 10.3 oz)                         Body mass index is 38.71 kg/m².    Intake/Output Summary (Last 24 hours) at 3/6/2023 1009  Last data filed at 3/6/2023 0832  Gross per 24 hour   Intake 2254.5 ml   Output 3925 ml   Net -1670.5 ml         Exam:  GEN:  No distress, appears stated age  EYES:   PERRL, anicteric sclerae  ENT:    External ears/nose normal, OP clear  NECK:  No adenopathy, midline trachea  LUNGS: Normal chest on inspection, palpation and auscultation  CV:  Normal S1S2, without murmur  ABD:  Nontender, nondistended, no hepatosplenomegaly, +BS  EXT:  No edema.  No cyanosis or clubbing.  No mottling and normal cap refill.    Assessment     Scheduled meds:  doxycycline, 100 mg, Intravenous, Q12H  heparin (porcine), 5,000 Units, Subcutaneous, Q12H  ketorolac, 1 drop, Left Eye, 4x Daily  sodium chloride, 10 mL, Intravenous, Q12H      IV meds:                      DOPamine, 2-20 mcg/kg/min, Last Rate: Stopped (23)      Data Review:  Results from last 7 days   Lab Units 23  0602 23  0611 23  1541 23  0323 23  0132  03/03/23  2223   SODIUM mmol/L 143 138  --  138 136 134*   POTASSIUM mmol/L 4.3 5.0 5.8* 5.9* 5.6* 6.1*   CHLORIDE mmol/L 111* 107  --  106 105 100   CO2 mmol/L 21.1* 21.6*  --  22.1 22.1 20.0*   BUN mg/dL 32* 53*  --  81* 79* 86*   CREATININE mg/dL 0.93 1.06*  --  1.22* 1.26* 1.64*   GLUCOSE mg/dL 62* 95  --  70 24* 134*   CALCIUM mg/dL 9.3 9.5  --  9.1 9.1 9.4         Estimated Creatinine Clearance: 50.4 mL/min (by C-G formula based on SCr of 0.93 mg/dL).  Results from last 7 days   Lab Units 03/05/23  0611 03/04/23  0323 03/04/23  0132 03/03/23  2223   WBC 10*3/mm3 5.14 4.86 5.49 4.74   HEMOGLOBIN g/dL 10.9* 10.6* 10.7* 11.6*   PLATELETS 10*3/mm3 204 199 184 223     Results from last 7 days   Lab Units 03/03/23  2223   INR  2.56*     Results from last 7 days   Lab Units 03/03/23  2223   ALT (SGPT) U/L 42*   AST (SGOT) U/L 40*         Results from last 7 days   Lab Units 03/04/23  0323 03/04/23  0027   PROCALCITONIN ng/mL 0.11 0.09   LACTATE mmol/L 1.0  --          Glucose   Date/Time Value Ref Range Status   03/06/2023 0830 133 (H) 70 - 130 mg/dL Final     Comment:     Meter: [id number] : [id number] Will Gutierrez MIAH   03/06/2023 0736 67 (L) 70 - 130 mg/dL Final     Comment:     Meter: [id number] : [id number] Will Gutierrez MIAH   03/05/2023 1737 110 70 - 130 mg/dL Final     Comment:     Meter: [id number] : [id number] Taylor Masoud    03/05/2023 1120 101 70 - 130 mg/dL Final     Comment:     Meter: [id number] : [id number] Taylor DOOLEY   03/05/2023 0616 88 70 - 130 mg/dL Final     Comment:     Meter: [id number] : JERRY Sanchez RN   03/04/2023 2054 114 70 - 130 mg/dL Final     Comment:     Meter: [id number] : [id number] Mook Mixon RN   03/04/2023 0717 92 70 - 130 mg/dL Final     Comment:     Meter: [id number] : [id number] Enrique DOOLEY     Chest x-ray 3/3 reviewed    CT chest 3/4 reviewed: Groundglass infiltrates suggestive of infectious versus  inflammatory process.  Unable to rule out neoplastic.  Large pulmonary AVM left base.          Microbiology reviewed              Active Hospital Problems    Diagnosis  POA   • **Symptomatic bradycardia [R00.1]  Yes      Resolved Hospital Problems   No resolved problems to display.         ASSESSMENT:    1. Symptomatic bradycardia  2. Hyperkalemia, potassium 6.1  3. Acute kidney injury  4. Hypothermia  5. Stable left lower lobe AVM  6. Right multifocal infiltrate/pneumonia  7. Obstructive sleep apnea, not currently on PAP therapy  8. Atrial fibrillation  9. Grade 2 diastolic dysfunction  10. Moderate pulm hypertension  11. Chronic anticoagulation with Xarelto  12. Chronic back pain on narcotics  13. Obesity (BMI 38)          PLAN:    Heart rate and blood pressure stable off dopamine drip.  2D echo shows an ejection fraction of 75% with grade 2 diastolic dysfunction and moderate pulm hypertension with right ventricular systolic pressure 48.7 mmHg.    Cardiology input appreciated.  Temperature improved: Unclear etiology of her initial hypothermia.  Continue antibiotic.  DVT prophylaxis.    Discussed with family at bedside.    Out of ICU when okay with cardiology service.          Jose Holcomb MD  Pulmonary and Critical Care Medicine  [facility]  3/6/2023    10:09 EST

## 2023-03-06 NOTE — TELEPHONE ENCOUNTER
Caller: WEN    Relationship: [facility]    Best call back number: [phone]    What is the best time to reach you: ANYTIME    Who are you requesting to speak with (clinical staff, provider,  specific staff member): CLINICAL STAFF    Do you know the name of the person who called: [facility]     What was the call regarding: [facility] CALLED AND SAID THAT THE PATIENT WILL BE DISCHARGING FROM THE HOSPITAL SOON AFTER BEING ADMITTED WITH ATRIAL FIBRILLATION AND TACHYCARDIA, SHE IS WANTING TO SEE IF [facility] WILL FOLLOW THE PATIENT FOR HOME HEALTH ORDERS    Do you require a callback: YES

## 2023-03-06 NOTE — PROGRESS NOTES
[facility]  Progress note: 3/6/2023    Patient Identification:  Name:Tamara Singh  Age:83 y.o.  Sex: female  :  1940  MRN: [id number]           CC:  Bradycardia, atrial fibrillation    Interval history:  Feels better, hopes to move out of ICU, no CP    Vital Signs:   Temp:  [97.4 °F (36.3 °C)-97.8 °F (36.6 °C)] 97.8 °F (36.6 °C)  Heart Rate:  [44-79] 55  Resp:  [14-15] 14  BP: (104-134)/() 110/53    Intake/Output Summary (Last 24 hours) at 3/6/2023 1049  Last data filed at 3/6/2023 0832  Gross per 24 hour   Intake 2254.5 ml   Output 3925 ml   Net -1670.5 ml       Physical Examination:    General Appearance No acute distress   Neck No adenopathy, supple, trachea midline, no thyromegaly, no carotid bruit, no JVD   Lungs Clear to auscultation,respirations regular, even and unlabored   Heart Irregular rhythm and normal rate, normal S1 and S2, no murmur, no gallop, no rub, no click   Chest wall No abnormalities observed   Abdomen Normal bowel sounds, no masses, no hepatomegaly, soft   Extremities Moves all extremities well, no edema, no cyanosis, no redness   Neurological Alert and oriented x 3     Lab Review:  Personally reviewed the labs, radiology imaging and other cardiac procedures.   Results from last 7 days   Lab Units 23  0602 23  0132 23  2223   SODIUM mmol/L 143   < > 134*   POTASSIUM mmol/L 4.3   < > 6.1*   CHLORIDE mmol/L 111*   < > 100   CO2 mmol/L 21.1*   < > 20.0*   BUN mg/dL 32*   < > 86*   CREATININE mg/dL 0.93   < > 1.64*   CALCIUM mg/dL 9.3   < > 9.4   BILIRUBIN mg/dL  --   --  0.5   ALK PHOS U/L  --   --  126*   ALT (SGPT) U/L  --   --  42*   AST (SGOT) U/L  --   --  40*   GLUCOSE mg/dL 62*   < > 134*    < > = values in this interval not displayed.     Results from last 7 days   Lab Units 23  0158 23  2223   HSTROP T ng/L 33* 36*     Results from last 7 days   Lab Units 23  0611 23  0323 23  0132   WBC 10*3/mm3 5.14 4.86 5.49    HEMOGLOBIN g/dL 10.9* 10.6* 10.7*   HEMATOCRIT % 32.3* 31.4* 31.6*   PLATELETS 10*3/mm3 204 199 184     Results from last 7 days   Lab Units 03/03/23  2223   INR  2.56*     Results for orders placed during the hospital encounter of 03/03/23    Adult Transthoracic Echo Complete W/ Cont if Necessary Per Protocol    Interpretation Summary  •  Left ventricular systolic function is hyperdynamic (EF > 70%). Calculated left ventricular EF = 74.9% Normal left ventricular wall thickness noted. The left ventricular cavity is borderline dilated. All left ventricular wall segments contract normally. Left ventricular diastolic function is consistent with (grade II w/high LAP) pseudonormalization.  •  Left atrial volume is mildly increased.  The right atrial cavity is mildly dilated.  •  The aortic valve is abnormal in structure. There is moderate thickening of the aortic valve. The aortic valve appears trileaflet.  •  Mild tricuspid valve regurgitation is present. Estimated right ventricular systolic pressure from tricuspid regurgitation is moderately elevated (45-55 mmHg). Calculated right ventricular systolic pressure from tricuspid regurgitation is 48.7 mmHg.      Medication Review:   Meds reviewed  Scheduled Meds:doxycycline, 100 mg, Intravenous, Q12H  heparin (porcine), 5,000 Units, Subcutaneous, Q12H  ketorolac, 1 drop, Left Eye, 4x Daily  sodium chloride, 10 mL, Intravenous, Q12H      I personally viewed and interpreted the patient's EKG/Telemetry data    Assessment and Plan  1.  Bradycardia  This mostly with likely due to hyperkalemia and hypothermia. Off dopamine, HR/BP stable  2.  Atrial fibrillation.  rate controlled on no rate control meds, EP consulted yesterday  3.  Hypothermia with hypotension, Resolved.  Pilm following  4.  Borderline abnormal troponin T.  Likely due to hypotension and renal insufficiency.  Echo with normal LV function without wall motion changes.  No anginal symptoms.  4.  Right sided  pneumonia.  Recommendations per Dr. Holcomb.  Remains on antibiotic therapy  5.  Hyperkalemia.  resolved  6.  Large pulmonary venous shunt.  7.  Sleep apnea, on CPAP therapy  8. Acute renal failure  9.  Left bundle branch block back to nonspecific IVCD with treatment of hyperkalemia and hypothermia.    Evens Miller MD [facility]  3/6/2023

## 2023-03-06 NOTE — PROGRESS NOTES
"    [facility] Progress Note      Patient Name: Tamara Singh  : 1940  MRN: [id number]  Primary Care Physician:  Yane Lama APRN  Date of admission: 3/3/2023    Subjective     Interval History:   Dopamine stopped this morning  HR transiently to high-30 range earlier today; she felt \"something was off\"  UOP 3.8 L yesterday after IV Lasix 20 mg  Breathing is comfortable RA; appetite fine; no N/V    Review of Systems:   As noted above    Objective     Vitals:   Temp:  [97.5 °F (36.4 °C)-97.8 °F (36.6 °C)] 97.6 °F (36.4 °C)  Heart Rate:  [44-71] 63  Resp:  [14] 14  BP: (104-150)/() 150/75    Intake/Output Summary (Last 24 hours) at 3/6/2023 1627  Last data filed at 3/6/2023 1228  Gross per 24 hour   Intake 1284.55 ml   Output 2400 ml   Net -1115.45 ml       Physical Exam:    Constitutional: Awake, alert, NAD, chronically ill-appearing, overweight  HEENT: Sclera anicteric, no conjunctival injection, AT/NC  Neck: Supple, no carotid bruit, trachea at midline, no JVD  Respiratory: Few crackles in bases, nonlabored on RA  Cardiovascular: Irregularly irregular, bradycardic, 2/6M  Gastrointestinal: BS +, soft, nontender and nondistended  : No palpable bladder  Ext: +2-3 edema both lower legs extending to hips, no clubbing or cyanosis  Psychiatric: Appropriate affect, cooperative, oriented  Neurologic:  moving all extremities, normal speech and mental status  Skin: Warm and dry     Scheduled Meds:     doxycycline, 100 mg, Oral, Q12H  ketorolac, 1 drop, Left Eye, 4x Daily  rivaroxaban, 20 mg, Oral, Daily With Dinner  sodium chloride, 10 mL, Intravenous, Q12H      IV Meds:   DOPamine, 2-20 mcg/kg/min, Last Rate: Stopped (230)        Results Reviewed:   I have personally reviewed the results from the time of this admission to 3/6/2023 16:27 EST     Results from last 7 days   Lab Units 23  0602 23  0611 23  1541 23  0323 23  0132 23  2223 "   SODIUM mmol/L 143 138  --  138   < > 134*   POTASSIUM mmol/L 4.3 5.0 5.8* 5.9*   < > 6.1*   CHLORIDE mmol/L 111* 107  --  106   < > 100   CO2 mmol/L 21.1* 21.6*  --  22.1   < > 20.0*   BUN mg/dL 32* 53*  --  81*   < > 86*   CREATININE mg/dL 0.93 1.06*  --  1.22*   < > 1.64*   CALCIUM mg/dL 9.3 9.5  --  9.1   < > 9.4   BILIRUBIN mg/dL  --   --   --   --   --  0.5   ALK PHOS U/L  --   --   --   --   --  126*   ALT (SGPT) U/L  --   --   --   --   --  42*   AST (SGOT) U/L  --   --   --   --   --  40*   GLUCOSE mg/dL 62* 95  --  70   < > 134*    < > = values in this interval not displayed.       Estimated Creatinine Clearance: 50.4 mL/min (by C-G formula based on SCr of 0.93 mg/dL).    Results from last 7 days   Lab Units 03/06/23  0602 03/05/23  0611 03/04/23  0323   MAGNESIUM mg/dL 1.7 2.1 2.5*   PHOSPHORUS mg/dL 2.3* 4.0 4.3             Results from last 7 days   Lab Units 03/05/23  0611 03/04/23  0323 03/04/23  0132 03/03/23  2223   WBC 10*3/mm3 5.14 4.86 5.49 4.74   HEMOGLOBIN g/dL 10.9* 10.6* 10.7* 11.6*   PLATELETS 10*3/mm3 204 199 184 223       Results from last 7 days   Lab Units 03/03/23  2223   INR  2.56*       Assessment / Plan     ASSESSMENT:  1.  FRANCISCA, nonoliguric, improving and multifactorial:  hypotension and bradycardia limiting renal perfusion; urinary retention; impaired renal autoregulation due to benazepril.    Volume excess, but improving with diuretic. Hyperkalemia resolved, due to FRANCISCA and benazepril; normal anion gap.  Low phosphorus and low-normal magnesium. [place] UA  2.  Junctional bradycardia and PAF.  Low heart rate today in the absence of  hyperkalemia  3.  Profound lower extremity edema  4.  Weight loss of 50 pounds over the past 1-2 years  5.  Pulmonary hypertension and untreated SALAS   6.  Hypotension, resolved  7.  Hypothermia, resolved  8.  Hypothyroidism    PLAN:  1.  Replace phosphorus and magnesium  2.  Resume oral furosemide, though at higher dose of 40 mg twice daily    Thank you  for involving us in the care of Tamara Singh.  Please feel free to call with any questions.    Varun Cai MD  03/06/23  16:27 [facility]    [facility]  [phone]    Please note that portions of this note were completed with a voice recognition program.

## 2023-03-06 NOTE — PLAN OF CARE
Goal Outcome Evaluation:     Patient stable throughout the night.  Patient off dopamine drip.  SBP 120s - 130s

## 2023-03-06 NOTE — DISCHARGE PLACEMENT REQUEST
"Reid Conroy (83 y.o. Female)     Date of Birth   1940    Social Security Number       Address   [address] [facility] [address]    Home Phone   [phone]    MRN   [id number]       [facility]    Marital Status   Single                            Admission Date   3/3/23    Admission Type   Emergency    Admitting Provider   Efe Stanley MD    Attending Provider   Jose Holcomb MD    Department, Room/Bed   [facility] CARDIAC INTENSIVE CARE, 3007/1       Discharge Date       Discharge Disposition       Discharge Destination                               Attending Provider: Jose Holcomb MD    Allergies: Moxifloxacin, Penicillins    Isolation: None   Infection: None   Code Status: CPR    Ht: 157.5 cm (62\")   Wt: 96 kg (211 lb 10.3 oz)    Admission Cmt: None   Principal Problem: Symptomatic bradycardia [R00.1]                 Active Insurance as of 3/3/2023     Primary Coverage     Payor Plan Insurance Group Employer/Plan Group    MEDICARE MEDICARE A & B      Payor Plan Address Payor Plan Phone Number Payor Plan Fax Number Effective Dates    [address] [phone]  1/1/2006 - None Entered    [facility] [id number]       Subscriber Name Subscriber Birth Date Member ID       REID CONROY 1940 [id number]           Secondary Coverage     Payor Plan Insurance Group Employer/Plan Group    [facility] SUPP [id number]     Payor Plan Address Payor Plan Phone Number Payor Plan Fax Number Effective Dates    [address]   12/1/2016 - None Entered    [facility] [id number]       Subscriber Name Subscriber Birth Date Member ID       REID CONROY 1940 [id number]                 Emergency Contacts      (Rel.) Home Phone Work Phone Mobile Phone    FranciscoKwadwo (Brother) [phone] -- --    ABBY CONROY (Other) [phone] -- --    Chasity Cobb (Sister) [phone] -- --              "

## 2023-03-06 NOTE — NURSING NOTE
CWON note: pt seen for evaluation of jonny LE, possible cellulitis. Pt remains in the ICU, she is alert and oriented, on a Progressa bed. Jonny LE are chronically large, pedal pulses are palpable, no pitting edema, some hemosiderin staining noted on the anterior shins. No erythema or open wounds noted. She does not need leg wraps at this time, but may benefit from lymphedema wraps in the future. Please re-consult for any additional needs.

## 2023-03-06 NOTE — PROGRESS NOTES
Discharge Planning Assessment  [facility]     Patient Name: Tamara Singh  MRN: [id number]  Today's Date: 3/6/2023    Admit Date: 3/3/2023    Plan: Plan is home with LUZMA Azul will follow   Discharge Needs Assessment     Row Name 03/06/23 1324       Living Environment    People in Home sibling(s)    Name(s) of People in Home sister lives with her and brother lives in duplex    Current Living Arrangements home    Potentially Unsafe Housing Conditions none    Primary Care Provided by self    Provides Primary Care For no one    Family Caregiver if Needed none    Quality of Family Relationships unable to assess    Able to Return to Prior Arrangements yes       Resource/Environmental Concerns    Transportation Concerns none       Food Insecurity    Within the past 12 months, you worried that your food would run out before you got the money to buy more. Never true    Within the past 12 months, the food you bought just didn't last and you didn't have money to get more. Never true       Transition Planning    Patient/Family Anticipates Transition to home with family;home with help/services    Patient/Family Anticipated Services at Transition home health care    Transportation Anticipated family or friend will provide       Discharge Needs Assessment    Equipment Currently Used at Home cane, straight;walker, rolling    Concerns to be Addressed discharge planning    Anticipated Changes Related to Illness none    Equipment Needed After Discharge none               Discharge Plan     Row Name 03/06/23 1326       Plan    Plan Plan is home with TYRELL Azul will follow    Plan Comments IMM noted.  CCP spoke to patient at bedside.  CCP role explained.  Face sheet verified.  Discharge planning discussed.  Pt emergency contact is her brother Kwadwo, [phone].   Pt obtains her medications from [facility].   Pt lives in a house with her sister Chasity and her bother in duplex up stairs.      She uses a  walker to ambulate.  She is independent with ADL’s.   She has no rehab history.  She has no HH history.  Plan is home with Home Health.  Latha with [facility] will follow. [facility] following for discharge needs              Continued Care and Services - Admitted Since 3/3/2023     Home Medical Care     Service Provider Request Status Selected Services Address Phone Fax Patient Preferred    [facility] Pending - Request Sent N/A [id number] [facility] [address] [address] [phone] --                 Demographic Summary    No documentation.                Functional Status    No documentation.                Psychosocial    No documentation.                Abuse/Neglect    No documentation.                Legal    No documentation.                Substance Abuse    No documentation.                Patient Forms    No documentation.                   Ama Rosales RN

## 2023-03-07 LAB
ALBUMIN SERPL-MCNC: 3.1 G/DL (ref 3.5–5.2)
ANION GAP SERPL CALCULATED.3IONS-SCNC: 8 MMOL/L (ref 5–15)
BUN SERPL-MCNC: 23 MG/DL (ref 8–23)
BUN/CREAT SERPL: 25.6 (ref 7–25)
CALCIUM SPEC-SCNC: 9 MG/DL (ref 8.6–10.5)
CHLORIDE SERPL-SCNC: 113 MMOL/L (ref 98–107)
CO2 SERPL-SCNC: 21 MMOL/L (ref 22–29)
CREAT SERPL-MCNC: 0.9 MG/DL (ref 0.57–1)
EGFRCR SERPLBLD CKD-EPI 2021: 64 ML/MIN/1.73
GLUCOSE SERPL-MCNC: 67 MG/DL (ref 65–99)
MAGNESIUM SERPL-MCNC: 2.7 MG/DL (ref 1.6–2.4)
PHOSPHATE SERPL-MCNC: 2.9 MG/DL (ref 2.5–4.5)
POTASSIUM SERPL-SCNC: 4 MMOL/L (ref 3.5–5.2)
SODIUM SERPL-SCNC: 142 MMOL/L (ref 136–145)

## 2023-03-07 PROCEDURE — 83735 ASSAY OF MAGNESIUM: CPT | Performed by: INTERNAL MEDICINE

## 2023-03-07 PROCEDURE — 80069 RENAL FUNCTION PANEL: CPT | Performed by: INTERNAL MEDICINE

## 2023-03-07 PROCEDURE — 97162 PT EVAL MOD COMPLEX 30 MIN: CPT

## 2023-03-07 PROCEDURE — 97530 THERAPEUTIC ACTIVITIES: CPT

## 2023-03-07 RX ORDER — BISACODYL 5 MG/1
5 TABLET, DELAYED RELEASE ORAL DAILY PRN
Status: DISCONTINUED | OUTPATIENT
Start: 2023-03-07 | End: 2023-03-08 | Stop reason: HOSPADM

## 2023-03-07 RX ORDER — POLYETHYLENE GLYCOL 3350 17 G/17G
17 POWDER, FOR SOLUTION ORAL DAILY PRN
Status: DISCONTINUED | OUTPATIENT
Start: 2023-03-07 | End: 2023-03-08 | Stop reason: HOSPADM

## 2023-03-07 RX ORDER — AMOXICILLIN 250 MG
1 CAPSULE ORAL 2 TIMES DAILY
Status: DISCONTINUED | OUTPATIENT
Start: 2023-03-07 | End: 2023-03-08 | Stop reason: HOSPADM

## 2023-03-07 RX ORDER — BISACODYL 10 MG
10 SUPPOSITORY, RECTAL RECTAL DAILY PRN
Status: DISCONTINUED | OUTPATIENT
Start: 2023-03-07 | End: 2023-03-08 | Stop reason: HOSPADM

## 2023-03-07 RX ADMIN — KETOROLAC TROMETHAMINE 1 DROP: 0.5 SOLUTION OPHTHALMIC at 08:51

## 2023-03-07 RX ADMIN — RIVAROXABAN 20 MG: 20 TABLET, FILM COATED ORAL at 18:21

## 2023-03-07 RX ADMIN — DOCUSATE SODIUM 50MG AND SENNOSIDES 8.6MG 1 TABLET: 8.6; 5 TABLET, FILM COATED ORAL at 20:00

## 2023-03-07 RX ADMIN — DOCUSATE SODIUM 50MG AND SENNOSIDES 8.6MG 1 TABLET: 8.6; 5 TABLET, FILM COATED ORAL at 14:39

## 2023-03-07 RX ADMIN — DOXYCYCLINE 100 MG: 100 CAPSULE ORAL at 08:50

## 2023-03-07 RX ADMIN — KETOROLAC TROMETHAMINE 1 DROP: 0.5 SOLUTION OPHTHALMIC at 12:29

## 2023-03-07 RX ADMIN — Medication 10 ML: at 09:00

## 2023-03-07 RX ADMIN — FUROSEMIDE 40 MG: 40 TABLET ORAL at 08:50

## 2023-03-07 RX ADMIN — KETOROLAC TROMETHAMINE 1 DROP: 0.5 SOLUTION OPHTHALMIC at 20:00

## 2023-03-07 RX ADMIN — DOXYCYCLINE 100 MG: 100 CAPSULE ORAL at 20:00

## 2023-03-07 RX ADMIN — Medication 10 ML: at 20:00

## 2023-03-07 RX ADMIN — FUROSEMIDE 40 MG: 40 TABLET ORAL at 18:21

## 2023-03-07 RX ADMIN — KETOROLAC TROMETHAMINE 1 DROP: 0.5 SOLUTION OPHTHALMIC at 18:21

## 2023-03-07 NOTE — THERAPY EVALUATION
Patient Name: Tamara Singh  : 1940    MRN: [id number]                              Today's Date: 3/7/2023       Admit Date: 3/3/2023    Visit Dx:     ICD-10-CM ICD-9-CM   1. Symptomatic bradycardia  R00.1 427.89   2. Hyperkalemia  E87.5 276.7   3. FRANCISCA (acute kidney injury) (HCC)  N17.9 584.9   4. History of atrial fibrillation  Z86.79 V12.59   5. Anticoagulated  Z79.01 V58.61   6. Pulmonary hypertension (HCC)  I27.20 416.8   7. Hypothermia, initial encounter  T68.XXXA 991.6     Patient Active Problem List   Diagnosis   • Lumbar radiculopathy   • Anxiety   • Secondary polycythemia   • Arteriovenous malformation   • Venous stasis dermatitis   • Eczema   • Hypertension   • Hypoglycemia   • Knee pain   • Low back pain   • Lymphedema   • Obstructive sleep apnea syndrome   • Pain in thoracic spine   • Varicose veins of lower extremity with inflammation   • Cobalamin deficiency   • Vitamin D deficiency   • Muscle spasms of both lower extremities   • Generalized osteoarthritis   • Pulmonary hypertension (HCC)   • High risk medication use   • Atrial fibrillation (HCC)   • Symptomatic bradycardia     Past Medical History:   Diagnosis Date   • Abnormal ECG 2021   • Anxiety    • Arthritis    • Asthma     Used to use inhaler but dont anymore.   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   • Cholelithiasis    • Coronary artery disease    • Hypertension    • Legally blind    • Low back pain    • Macular degeneration    • Obesity    • Pulmonary hypertension (HCC)    • Pulmonary hypertension (HCC)    • Sleep apnea    • Urinary tract infection    • Visual impairment     macular degeneration     Past Surgical History:   Procedure Laterality Date   • CHOLECYSTECTOMY        General Information     Row Name 23 1441          Physical Therapy Time and Intention    Document Type evaluation  -CS     Mode of Treatment individual therapy;physical therapy  -CS     Row Name 23 1441           General Information    Patient Profile Reviewed yes  -CS     Prior Level of Function independent:;all household mobility;gait;transfer;bed mobility;community mobility  SC for community distances only  -CS     Existing Precautions/Restrictions fall  -CS     Barriers to Rehab medically complex  -CS     Row Name 03/07/23 1441          Living Environment    People in Home sibling(s)  -CS     Row Name 03/07/23 1441          Home Main Entrance    Number of Stairs, Main Entrance two  -CS     Stair Railings, Main Entrance railings safe and in good condition  -CS     Row Name 03/07/23 1441          Stairs Within Home, Primary    Number of Stairs, Within Home, Primary none  -CS     Row Name 03/07/23 1441          Cognition    Orientation Status (Cognition) oriented x 3  -CS     Row Name 03/07/23 1441          Safety Issues, Functional Mobility    Impairments Affecting Function (Mobility) endurance/activity tolerance;strength  -CS           User Key  (r) = Recorded By, (t) = Taken By, (c) = Cosigned By    Initials Name Provider Type    CS Belinda Knott, PT Physical Therapist               Mobility     Row Name 03/07/23 1442          Bed Mobility    Bed Mobility supine-sit;sit-supine  -CS     Supine-Sit [place] (Bed Mobility) standby assist  -CS     Sit-Supine [place] (Bed Mobility) not tested  -CS     Assistive Device (Bed Mobility) head of bed elevated  -CS     Comment, (Bed Mobility) UIC at end of session  -CS     Row Name 03/07/23 1442          Sit-Stand Transfer    Sit-Stand [place] (Transfers) contact guard  -CS     Assistive Device (Sit-Stand Transfers) walker, front-wheeled  -CS     Row Name 03/07/23 1442          Gait/Stairs (Locomotion)    [place] Level (Gait) contact guard  -CS     Assistive Device (Gait) walker, front-wheeled  -CS     Distance in Feet (Gait) 80'  -CS     Deviations/Abnormal Patterns (Gait) jin decreased;gait speed decreased;stride length decreased  -CS     Bilateral Gait  Deviations heel strike decreased  -CS     [place] Level (Stairs) not tested  -CS     Comment, (Gait/Stairs) slow pace but no unsteadiness  -CS           User Key  (r) = Recorded By, (t) = Taken By, (c) = Cosigned By    Initials Name Provider Type    CS Belinda Knott, PT Physical Therapist               Obj/Interventions     Row Name 03/07/23 1442          Range of Motion Comprehensive    General Range of Motion bilateral lower extremity ROM WFL  -     Row Name 03/07/23 1442          Strength Comprehensive (MMT)    General Manual Muscle Testing (MMT) Assessment other (see comments)  -CS     Comment, General Manual Muscle Testing (MMT) Assessment generalized weakness; B LE >/= 3/5  -CS     Row Name 03/07/23 1442          Balance    Balance Assessment sitting static balance;sitting dynamic balance;standing static balance;standing dynamic balance  -CS     Static Sitting Balance standby assist  -CS     Dynamic Sitting Balance standby assist  -CS     Position, Sitting Balance unsupported;sitting edge of bed  -CS     Static Standing Balance contact guard  -CS     Dynamic Standing Balance contact guard  -CS     Position/Device Used, Standing Balance supported;walker, front-wheeled  -CS           User Key  (r) = Recorded By, (t) = Taken By, (c) = Cosigned By    Initials Name Provider Type    CS Belinda Knott, PT Physical Therapist               Goals/Plan     Row Name 03/07/23 1452          Bed Mobility Goal 1 (PT)    Activity/Assistive Device (Bed Mobility Goal 1, PT) sit to supine;supine to sit  -CS     [place] Level/Cues Needed (Bed Mobility Goal 1, PT) modified independence  -CS     Time Frame (Bed Mobility Goal 1, PT) 1 week  -     Row Name 03/07/23 1455          Transfer Goal 1 (PT)    Activity/Assistive Device (Transfer Goal 1, PT) sit-to-stand/stand-to-sit;bed-to-chair/chair-to-bed  -CS     [place] Level/Cues Needed (Transfer Goal 1, PT) standby assist  -CS     Time Frame (Transfer Goal 1, PT)  1 week  -CS     Row Name 03/07/23 1459          Gait Training Goal 1 (PT)    Activity/Assistive Device (Gait Training Goal 1, PT) gait (walking locomotion);assistive device use;decrease fall risk;increase endurance/gait distance  -CS     [place] Level (Gait Training Goal 1, PT) standby assist  -CS     Distance (Gait Training Goal 1, PT) 100'  -CS     Time Frame (Gait Training Goal 1, PT) 1 week  -CS           User Key  (r) = Recorded By, (t) = Taken By, (c) = Cosigned By    Initials Name Provider Type    CS Belinda Knott, PT Physical Therapist               Clinical Impression     Row Name 03/07/23 1443          Pain    Pretreatment Pain Rating 0/10 - no pain  -CS     Posttreatment Pain Rating 0/10 - no pain  -CS     Row Name 03/07/23 1443          Plan of Care Review    Plan of Care Reviewed With patient;sibling  -CS     Outcome Evaluation Pt is a 84 y/o F admitted to [facility] with c/o SOA, generalized weakness, and B LE swelling. Work-up revealing symptomatic bradycardia. Pt has a past med hx of hypertension, chronic back pain, atrial fibrillation, and pulmonary hypertension. Pt received in bed upon arrival and agreeable to PT eval. Pt reports she lives with her siblings with 2 YOSELIN and was (I) with mobility prior to admission. Pt presents to PT with generalized weakness and decreased endurance. Pt required SBA and increased time to perform supine to sit. Pt stood and ambulated 80' c RW requiring CGA. Pt demonstrates a slow pace but no unsteadiness. Distance limited by fatigue. Pt UIC at end of session with all needs in reach. PT recommends home with assist and HHPT at D/C.  -CS     Row Name 03/07/23 1443          Therapy Assessment/Plan (PT)    Patient/Family Therapy Goals Statement (PT) to return home  -CS     Rehab Potential (PT) good, to achieve stated therapy goals  -CS     Criteria for Skilled Interventions Met (PT) yes;meets criteria  -CS     Therapy Frequency (PT) 5 times/wk  -CS     Row Name 03/07/23  1443          Positioning and Restraints    Pre-Treatment Position in bed  -CS     Post Treatment Position chair  -CS     In Chair reclined;call light within reach;encouraged to call for assist;with family/caregiver  no alarm upon entry - no chair alarm box in room  -CS           User Key  (r) = Recorded By, (t) = Taken By, (c) = Cosigned By    Initials Name Provider Type    Belinda Freeman, PT Physical Therapist               Outcome Measures     Row Name 03/07/23 1501          How much help from another person do you currently need...    Turning from your back to your side while in flat bed without using bedrails? 3  -CS     Moving from lying on back to sitting on the side of a flat bed without bedrails? 3  -CS     Moving to and from a bed to a chair (including a wheelchair)? 3  -CS     Standing up from a chair using your arms (e.g., wheelchair, bedside chair)? 3  -CS     Climbing 3-5 steps with a railing? 2  -CS     To walk in hospital room? 3  -CS     AM-PAC 6 Clicks Score (PT) 17  -CS     Highest level of mobility 5 --> Static standing  -CS     Row Name 03/07/23 1501          Functional Assessment    Outcome Measure Options AM-PAC 6 Clicks Basic Mobility (PT)  -CS           User Key  (r) = Recorded By, (t) = Taken By, (c) = Cosigned By    Initials Name Provider Type    Belinda Freeman, GUILLERMO Physical Therapist                             Physical Therapy Education     Title: PT OT SLP Therapies (In Progress)     Topic: Physical Therapy (In Progress)     Point: Mobility training (Done)     Learning Progress Summary           Patient Acceptance, E,TB, VU,DU by  at 3/7/2023 1501                   Point: Home exercise program (Not Started)     Learner Progress:  Not documented in this visit.          Point: Body mechanics (Done)     Learning Progress Summary           Patient Acceptance, E,TB, VU,DU by CS at 3/7/2023 1501                   Point: Precautions (Done)     Learning Progress Summary            Patient Acceptance, E,TB, VU,DU by  at 3/7/2023 1501                               User Key     Initials Effective Dates Name Provider Type Discipline     09/22/22 -  Belinda Knott PT Physical Therapist PT              PT Recommendation and Plan     Plan of Care Reviewed With: patient, sibling  Outcome Evaluation: Pt is a 82 y/o F admitted to [facility] with c/o SOA, generalized weakness, and B LE swelling. Work-up revealing symptomatic bradycardia. Pt has a past med hx of hypertension, chronic back pain, atrial fibrillation, and pulmonary hypertension. Pt received in bed upon arrival and agreeable to PT eval. Pt reports she lives with her siblings with 2 YOSELIN and was (I) with mobility prior to admission. Pt presents to PT with generalized weakness and decreased endurance. Pt required SBA and increased time to perform supine to sit. Pt stood and ambulated 80' c RW requiring CGA. Pt demonstrates a slow pace but no unsteadiness. Distance limited by fatigue. Pt UIC at end of session with all needs in reach. PT recommends home with assist and HHPT at D/C.     Time Calculation:    PT Charges     Row Name 03/07/23 1501             Time Calculation    Start Time 1339  -CS      Stop Time 1351  -CS      Time Calculation (min) 12 min  -CS      PT Received On 03/07/23  -      PT - Next Appointment 03/08/23  -      PT Goal Re-Cert Due Date 03/14/23  -         Time Calculation- PT    Total Timed Code Minutes- PT 10 minute(s)  -CS         Timed Charges    48668 - PT Therapeutic Activity Minutes 10  -CS         Total Minutes    Timed Charges Total Minutes 10  -CS       Total Minutes 10  -CS            User Key  (r) = Recorded By, (t) = Taken By, (c) = Cosigned By    Initials Name Provider Type    CS Belinda Knott PT Physical Therapist              Therapy Charges for Today     Code Description Service Date Service Provider Modifiers Qty    64132440491 HC PT THERAPEUTIC ACT EA 15 MIN 3/7/2023 Belinda Knott PT GP 1     27143091972  PT EVAL MOD COMPLEXITY 3 3/7/2023 Belinda Knott, PT GP 1          PT G-Codes  Outcome Measure Options: AM-PAC 6 Clicks Basic Mobility (PT)  AM-PAC 6 Clicks Score (PT): 17  PT Discharge Summary  Anticipated Discharge Disposition (PT): home with assist, home with home health    Belinda Knott, GUILLERMO  3/7/2023

## 2023-03-07 NOTE — PROGRESS NOTES
[facility] Progress Note      Patient Name: Tamara Singh  : 1940  MRN: [id number]  Primary Care Physician:  Yane Lama APRN  Date of admission: 3/3/2023    Subjective     Interval History:   She feels fine  HR in 30-40 range mostly  UOP 2.7 L yesterday; leg swelling better  Breathing is comfortable RA; appetite fine; no N/V    Review of Systems:   As noted above    Objective     Vitals:   Temp:  [97.6 °F (36.4 °C)-97.8 °F (36.6 °C)] 97.7 °F (36.5 °C)  Heart Rate:  [35-80] 42  BP: ()/(42-94) 134/94    Intake/Output Summary (Last 24 hours) at 3/7/2023 1434  Last data filed at 3/7/2023 1227  Gross per 24 hour   Intake 1917 ml   Output 2725 ml   Net -808 ml       Physical Exam:    Constitutional: Awake, alert, NAD, overweight  HEENT: Sclera anicteric, no conjunctival injection, AT/NC  Neck: Supple, no carotid bruit, trachea at midline, no JVD  Respiratory: Few crackles in bases, nonlabored on RA  Cardiovascular: Irregularly irregular, bradycardic, 2/6M  Gastrointestinal: BS +, soft, nontender and nondistended  : No palpable bladder  Ext: +2 edema both lower legs extending to hips, no clubbing or cyanosis  Psychiatric: Appropriate affect, cooperative, oriented  Neurologic:  moving all extremities, normal speech and mental status  Skin: Warm and dry     Scheduled Meds:     doxycycline, 100 mg, Oral, Q12H  furosemide, 40 mg, Oral, BID  ketorolac, 1 drop, Left Eye, 4x Daily  rivaroxaban, 20 mg, Oral, Daily With Dinner  senna-docusate sodium, 1 tablet, Oral, BID  sodium chloride, 10 mL, Intravenous, Q12H      IV Meds:        Results Reviewed:   I have personally reviewed the results from the time of this admission to 3/7/2023 14:34 EST     Results from last 7 days   Lab Units 23  0634 23  0602 23  0611 23  0132 23  2223   SODIUM mmol/L 142 143 138   < > 134*   POTASSIUM mmol/L 4.0 4.3 5.0   < > 6.1*   CHLORIDE mmol/L 113* 111* 107   < > 100   CO2  mmol/L 21.0* 21.1* 21.6*   < > 20.0*   BUN mg/dL 23 32* 53*   < > 86*   CREATININE mg/dL 0.90 0.93 1.06*   < > 1.64*   CALCIUM mg/dL 9.0 9.3 9.5   < > 9.4   BILIRUBIN mg/dL  --   --   --   --  0.5   ALK PHOS U/L  --   --   --   --  126*   ALT (SGPT) U/L  --   --   --   --  42*   AST (SGOT) U/L  --   --   --   --  40*   GLUCOSE mg/dL 67 62* 95   < > 134*    < > = values in this interval not displayed.       Estimated Creatinine Clearance: 51.4 mL/min (by C-G formula based on SCr of 0.9 mg/dL).    Results from last 7 days   Lab Units 03/07/23  0634 03/06/23  0602 03/05/23  0611   MAGNESIUM mg/dL 2.7* 1.7 2.1   PHOSPHORUS mg/dL 2.9 2.3* 4.0             Results from last 7 days   Lab Units 03/05/23  0611 03/04/23  0323 03/04/23  0132 03/03/23  2223   WBC 10*3/mm3 5.14 4.86 5.49 4.74   HEMOGLOBIN g/dL 10.9* 10.6* 10.7* 11.6*   PLATELETS 10*3/mm3 204 199 184 223       Results from last 7 days   Lab Units 03/03/23  2223   INR  2.56*       Assessment / Plan     ASSESSMENT:  1.  FRANCISCA, nonoliguric, resolved.  FRANCISCA was multifactorial:  hypotension and bradycardia limiting renal perfusion; urinary retention; impaired renal autoregulation due to benazepril.   Volume excess, but improving with diuretic. Hyperkalemia resolved, due to FRANCISCA and benazepril; normal anion gap.  Charles UA  2.  Junctional bradycardia and PAF.  Low heart rate today in the absence of  hyperkalemia  3.  Profound lower extremity edema  4.  Weight loss of 50 pounds over the past 1-2 years  5.  Pulmonary hypertension and untreated SALAS   6.  Hypotension, resolved  7.  Hypothermia, resolved  8.  Hypothyroidism    PLAN:  1.  Discontinue Galvan catheter  2.  Continue oral furosemide 40 mg twice daily  3.  Will sign off, please call if any questions    Thank you for involving us in the care of Tamara LISA Singh.  Please feel free to call with any questions.    Varun Cai MD  03/07/23  14:34 EST    [facility]  [phone]    Please note  that portions of this note were completed with a voice recognition program.

## 2023-03-07 NOTE — PROGRESS NOTES
EP has seen patient, no need for PM, bradycardia resolved with resolution of hypothermia. Remians in AFib on Xarelto.    Ep has signed off with no further cardiac needs to be address, call if we can help in any way

## 2023-03-07 NOTE — PROGRESS NOTES
[facility] INPATIENT PROGRESS NOTE         [facility] CARDIAC INTENSIVE CARE    3/7/2023      PATIENT IDENTIFICATION:  Name: Tamara Singh ADMIT: 3/3/2023   : 1940  PCP: Yane Lama APRN    MRN: [id number] LOS: 3 days   AGE/SEX: 83 y.o. female  ROOM: [facility]                     LOS 3    Reason for visit: Traumatic bradycardia      SUBJECTIVE:      Resting comfortably.  No new issues overnight.  Noted some bradycardia yesterday.  Not requiring dopamine anymore.  Cardiology following closely for possible pacemaker need.  Can move out of intensive care when okay with cardiology.  Discussed with family at bedside.      Objective   OBJECTIVE:    Vital Sign Min/Max for last 24 hours  Temp  Min: 97.6 °F (36.4 °C)  Max: 97.8 °F (36.6 °C)   BP  Min: 98/54  Max: 150/75   Pulse  Min: 35  Max: 80   No data recorded   SpO2  Min: 92 %  Max: 99 %   No data recorded   Weight  Min: 93.9 kg (207 lb 0.2 oz)  Max: 93.9 kg (207 lb 0.2 oz)                         Body mass index is 37.86 kg/m².    Intake/Output Summary (Last 24 hours) at 3/7/2023 0926  Last data filed at 3/7/2023 0354  Gross per 24 hour   Intake 1977 ml   Output 2525 ml   Net -548 ml         Exam:  GEN:  No distress, appears stated age  EYES:   PERRL, anicteric sclerae  ENT:    External ears/nose normal, OP clear  NECK:  No adenopathy, midline trachea  LUNGS: Normal chest on inspection, palpation and auscultation  CV:  Normal S1S2, without murmur  ABD:  Nontender, nondistended, no hepatosplenomegaly, +BS  EXT:  No edema.  No cyanosis or clubbing.  No mottling and normal cap refill.    Assessment     Scheduled meds:  doxycycline, 100 mg, Oral, Q12H  furosemide, 40 mg, Oral, BID  ketorolac, 1 drop, Left Eye, 4x Daily  rivaroxaban, 20 mg, Oral, Daily With Dinner  sodium chloride, 10 mL, Intravenous, Q12H      IV meds:                      DOPamine, 2-20 mcg/kg/min, Last Rate: Stopped (23)      Data Review:  Results from last 7 days   Lab  Units 03/07/23  0634 03/06/23  0602 03/05/23  0611 03/04/23  1541 03/04/23  0323 03/04/23  0132   SODIUM mmol/L 142 143 138  --  138 136   POTASSIUM mmol/L 4.0 4.3 5.0 5.8* 5.9* 5.6*   CHLORIDE mmol/L 113* 111* 107  --  106 105   CO2 mmol/L 21.0* 21.1* 21.6*  --  22.1 22.1   BUN mg/dL 23 32* 53*  --  81* 79*   CREATININE mg/dL 0.90 0.93 1.06*  --  1.22* 1.26*   GLUCOSE mg/dL 67 62* 95  --  70 24*   CALCIUM mg/dL 9.0 9.3 9.5  --  9.1 9.1         Estimated Creatinine Clearance: 51.4 mL/min (by C-G formula based on SCr of 0.9 mg/dL).  Results from last 7 days   Lab Units 03/05/23  0611 03/04/23  0323 03/04/23  0132 03/03/23  2223   WBC 10*3/mm3 5.14 4.86 5.49 4.74   HEMOGLOBIN g/dL 10.9* 10.6* 10.7* 11.6*   PLATELETS 10*3/mm3 204 199 184 223     Results from last 7 days   Lab Units 03/03/23  2223   INR  2.56*     Results from last 7 days   Lab Units 03/03/23  2223   ALT (SGPT) U/L 42*   AST (SGOT) U/L 40*         Results from last 7 days   Lab Units 03/04/23  0323 03/04/23  0027   PROCALCITONIN ng/mL 0.11 0.09   LACTATE mmol/L 1.0  --          Glucose   Date/Time Value Ref Range Status   03/06/2023 1132 84 70 - 130 mg/dL Final     Comment:     Meter: [id number] : [id number] Will DOOLEY   03/06/2023 0830 133 (H) 70 - 130 mg/dL Final     Comment:     Meter: [id number] : [id number] Will DOOLEY   03/06/2023 0736 67 (L) 70 - 130 mg/dL Final     Comment:     Meter: [id number] : [id number] Will Gutierrez    03/05/2023 1737 110 70 - 130 mg/dL Final     Comment:     Meter: [id number] : [id number] aTylor DOOLEY   03/05/2023 1120 101 70 - 130 mg/dL Final     Comment:     Meter: [id number] : [id number] Taylor Meredith    03/05/2023 0616 88 70 - 130 mg/dL Final     Comment:     Meter: [id number] : JERRY Sanchez RN   03/04/2023 2054 114 70 - 130 mg/dL Final     Comment:     Meter: [id number] : [id number] Mook Mixon RN     Chest x-ray 3/3 reviewed    CT chest  3/4 reviewed: Groundglass infiltrates suggestive of infectious versus inflammatory process.  Unable to rule out neoplastic.  Large pulmonary AVM left base.          Microbiology reviewed              Active Hospital Problems    Diagnosis  POA   • **Symptomatic bradycardia [R00.1]  Yes      Resolved Hospital Problems   No resolved problems to display.         ASSESSMENT:    1. Symptomatic bradycardia  2. Hyperkalemia, potassium 6.1  3. Acute kidney injury  4. Hypothermia  5. Stable left lower lobe AVM  6. Right multifocal infiltrate/pneumonia  7. Obstructive sleep apnea, not currently on PAP therapy  8. Atrial fibrillation  9. Grade 2 diastolic dysfunction  10. Moderate pulm hypertension  11. Chronic anticoagulation with Xarelto  12. Chronic back pain on narcotics  13. Obesity (BMI 38)          PLAN:    Heart rate and blood pressure stable off dopamine drip.  2D echo shows an ejection fraction of 75% with grade 2 diastolic dysfunction and moderate pulm hypertension with right ventricular systolic pressure 48.7 mmHg.    Cardiology input appreciated.  Monitoring closely for potential pacemaker need.  Temperature improved: Unclear etiology of her initial hypothermia.  Continue antibiotic.  DVT prophylaxis.    Discussed with family at bedside.    Out of ICU when okay with cardiology service.          Jose Holcomb MD  Pulmonary and Critical Care Medicine  [facility], [facility]  3/7/2023    09:26 EST

## 2023-03-07 NOTE — PROGRESS NOTES
[facility] Clinical Pharmacy Services: Medication Reconciliation     Tamara Singh is a 83 y.o. female presenting to [facility] for Hyperkalemia [E87.5]  Pulmonary hypertension (HCC) [I27.20]  History of atrial fibrillation [Z86.79]  Anticoagulated [Z79.01]  FRANCISCA (acute kidney injury) (HCC) [N17.9]  Symptomatic bradycardia [R00.1]  Hypothermia, initial encounter [T68.XXXA]       She  has a past medical history of Abnormal ECG (06/05/2021), Anxiety, Arthritis, Asthma, Atrial fibrillation (HCC), Cancer (HCC), Cholelithiasis, Coronary artery disease, Hypertension, Legally blind, Low back pain, Macular degeneration, Obesity, Pulmonary hypertension (HCC), Pulmonary hypertension (HCC), Sleep apnea, Urinary tract infection, and Visual impairment.       Allergies as of 03/03/2023 - Reviewed 03/03/2023   Allergen Reaction Noted    Moxifloxacin  04/06/2016    Penicillins  04/06/2016          Medication information was obtained from: external fill history and interview with patient/family     Prior to Admission Medications       Prescriptions Last Dose Informant Patient Reported? Taking?    amLODIPine-benazepril (LOTREL) 10-40 MG per capsule   No Yes    Take 1 capsule by mouth Daily.    carisoprodol (SOMA) 350 MG tablet Past Week  No Yes    Take 1 tablet by mouth 4 (Four) Times a Day As Needed for Muscle Spasms.    furosemide (Lasix) 40 MG tablet   No Yes    Take 1 tablet by mouth Daily.    HYDROcodone-acetaminophen (NORCO) 5-325 MG per tablet   No Yes    Take 1 tablet by mouth Every 4 (Four) Hours As Needed for Moderate Pain  or Severe Pain .    hydrOXYzine (ATARAX) 10 MG tablet   No Yes    Take 1 tablet by mouth At Night As Needed for Anxiety.    ketorolac (ACULAR) 0.5 % ophthalmic solution 3/4/2023  Yes Yes    INSTILL 1 DROP INTO LEFT EYE 4 TIMES A DAY    multivitamins-minerals (PRESERVISION AREDS 2) capsule capsule 3/3/2023  Yes No    Take 1 capsule by mouth 2 (Two) Times a Day.    rivaroxaban (Xarelto) 20  MG tablet   No Yes    Take 1 tablet by mouth Daily.    silver sulfadiazine (SILVADENE, SSD) 1 % cream   No Yes    Apply  topically to the appropriate area as directed 2 (Two) Times a Day. *CM:I87.2    Vitamin D, Cholecalciferol, 50 MCG (2000 UT) capsule   Yes Yes    Take 1 capsule by mouth Daily.           Medication notes:   Average use of hydroxyzine is 1 tablet nightly for sleep   Average use of hydrocodone-acetaminophen is 1-2 tablets daily for severe back pain  Average use of carisoprodol is 1-2 tablets per week for back pain   Patient claims xarelto use is for stroke prevention with atrial fibrillation       This medication list is complete to the best of my knowledge as of 3/7/2023       Please call if questions.     Gemini Washington, PharmD  Clinical Pharmacist

## 2023-03-07 NOTE — PLAN OF CARE
Goal Outcome Evaluation:  Plan of Care Reviewed With: patient, sibling           Outcome Evaluation: Pt is a 84 y/o F admitted to [facility] with c/o SOA, generalized weakness, and B LE swelling. Work-up revealing symptomatic bradycardia. Pt has a past med hx of hypertension, chronic back pain, atrial fibrillation, and pulmonary hypertension. Pt received in bed upon arrival and agreeable to PT eval. Pt reports she lives with her siblings with 2 YOSELIN and was (I) with mobility prior to admission. Pt presents to PT with generalized weakness and decreased endurance. Pt required SBA and increased time to perform supine to sit. Pt stood and ambulated 80' c RW requiring CGA. Pt demonstrates a slow pace but no unsteadiness. Distance limited by fatigue. Pt UIC at end of session with all needs in reach. PT recommends home with assist and HHPT at D/C.

## 2023-03-07 NOTE — PROGRESS NOTES
Reviewed patient's chart and telemetry with Dr. Lawton. No further episodes of significant bradycardia. She is off dopamine. Okay with transfer out of ICU. EP will see as needed for pacemaker consideration, if she has further episodes of symptomatic bradycardia.

## 2023-03-07 NOTE — CONSULTS
Patient Name: Tamara Singh  Age/Sex: 83 y.o. female  : 1940  MRN: [id number]    Date of Admission: 3/3/2023  Date of Encounter Visit: 23  Encounter Provider: Jayce Lawton MD  Place of Service: [facility] CARDIOLOGY      Referring Provider: Efe Stanley MD  Patient Care Team:  Yane Lama APRN as PCP - General (Family Medicine)  Kerrie, Chris FERRER III, MD as Surgeon (Thoracic Surgery)  Evens Miller III, MD as Consulting Physician (Cardiology)    Subjective:       Chief Complaint:   Persistent Atrial Fibrillation, Bradycardia    History of Present Illness:  Tamara Singh is a 83 y.o. female with a history of persistent atrial fibrillation.     She appears to have been persistently in afib since .      Notes from that time indicate that she was unaware of the atrial fibrillation.  She is not sure now.      She has never required any AV leatha blockade for rate control.  She was briefly on atenolol, but she discontinued this after only a few days/weeks for side effects.     She presented on 3/3 with compliant of shortness of breath.    Initial EKG, was felt to be junctional, but if you look closely in V1 it was sinus bradycardia with VA prolongation.      On 3/4 she was still in sinus but heart rate had increased and VA interval had shortened.     She is now back in atrial fibrillation, rate 60 bpm    She was placed on dopamine, which has been weaned off.      She doesn't have any complaints currently.       Past Medical History:  Past Medical History:   Diagnosis Date   • Abnormal ECG 2021   • Anxiety    • Arthritis    • Asthma     Used to use inhaler but dont anymore.   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 120 tablet 2 Past Week   • ketorolac (ACULAR) 0.5 % ophthalmic solution INSTILL 1 DROP INTO LEFT EYE 4 TIMES A DAY  3 3/4/2023 at 2200   • amLODIPine-benazepril (LOTREL) 10-40 MG per capsule Take 1 capsule by mouth Daily. 90 capsule 1 3/3/2023 at 0800   • furosemide (Lasix) 40 MG tablet Take 1 tablet by mouth Daily. 90 tablet 1 3/3/2023 at 0800   • HYDROcodone-acetaminophen (NORCO) 5-325 MG per tablet Take 1 tablet by mouth Every 4 (Four) Hours As Needed for Moderate Pain  or Severe Pain . 120 tablet 0 3/3/2023 at 0800   • hydrOXYzine (ATARAX) 10 MG tablet Take 1 tablet by mouth At Night As Needed for Anxiety. 30 tablet 0 3/2/2023 at 2000   • multivitamins-minerals (PRESERVISION AREDS 2) capsule capsule Take 1 capsule by mouth 2 (Two) Times a Day.   3/3/2023 at 0800   • PROAIR  (90 BASE) MCG/ACT inhaler INHALE 2 PUFFS BY MOUTH 4 TIMES DAILY AS NEEDED 1 inhaler 5    • rivaroxaban (Xarelto) 20 MG tablet Take 1 tablet by mouth Daily. 90 tablet 3 3/3/2023 at 0800   • silver sulfadiazine (SILVADENE, SSD) 1 % cream Apply  topically to the appropriate area as directed 2 (Two) Times a Day. *CM:I87.2 85 g 2 3/3/2023 at 2000   • Vitamin D, Cholecalciferol, 50 MCG (2000 UT) capsule Take 1 capsule by mouth Daily.   3/3/2023 at 0800       Allergies:  Allergies   Allergen Reactions   • Moxifloxacin    • Penicillins        Past Social History:  Social History     Socioeconomic History   • Marital status: Single   Tobacco Use   • Smoking status: Never   • Smokeless tobacco: Never   Vaping Use   • Vaping Use: Never used   Substance and Sexual  Activity   • Alcohol use: Never   • Drug use: No     Types: Hydrocodone     Comment: Pt is prescribe hydrocodone   • Sexual activity: Never        Past Family History:  Family History   Problem Relation Age of Onset   • Heart attack Mother 86   • Stroke Mother    • Heart disease Mother 80   • Hypertension Mother    • Heart failure Mother    • Heart attack Brother 70   • Asthma Brother    • Hyperlipidemia Brother    • Thyroid disease Brother    • Arthritis Brother    • Heart failure Father    • Kidney disease Sister         Stage 5 on dialysis       Review of Systems: All systems reviewed. Pertinent positives identified in HPI. All other systems are negative.     Review of Systems   Constitutional: Positive for malaise/fatigue.   HENT: Negative.    Eyes: Negative.    Cardiovascular: Negative for chest pain, dyspnea on exertion, leg swelling and near-syncope.   Respiratory: Positive for shortness of breath. Negative for cough.    Endocrine: Negative.    Hematologic/Lymphatic: Negative.    Skin: Negative.    Musculoskeletal: Negative.    Gastrointestinal: Negative.    Genitourinary: Negative.    Neurological: Negative.  Negative for weakness.   Psychiatric/Behavioral: Negative.    Allergic/Immunologic: Negative.          Objective:     Objective:  Temp:  [97.6 °F (36.4 °C)-97.8 °F (36.6 °C)] 97.6 °F (36.4 °C)  Heart Rate:  [43-71] 43  BP: (104-150)/(53-82) 130/68    Intake/Output Summary (Last 24 hours) at 3/6/2023 2028  Last data filed at 3/6/2023 1820  Gross per 24 hour   Intake 2492.64 ml   Output 2925 ml   Net -432.36 ml     Body mass index is 38.71 kg/m².      03/04/23  0855 03/05/23  0300 03/06/23  0400   Weight: 94.8 kg (209 lb) 96.1 kg (211 lb 13.8 oz) 96 kg (211 lb 10.3 oz)           Physical Exam:   Vitals and nursing note reviewed.   Constitutional:       Appearance: Frail.   HENT:    Mouth/Throat:      Pharynx: Oropharynx is clear.   Pulmonary:      Effort: Pulmonary effort is normal.   Cardiovascular:       Normal rate. Irregular rhythm.      Murmurs: There is a systolic murmur.   Edema:     Peripheral edema absent.   Skin:     General: Skin is warm.   Neurological:      General: No focal deficit present.      Mental Status: Alert and oriented to person, place and time.   Psychiatric:         Attention and Perception: Attention normal.         Mood and Affect: Mood normal.         Speech: Speech normal.         Behavior: Behavior is cooperative.           Lab Review:     Results from last 7 days   Lab Units 03/06/23  0602 03/05/23  0611 03/04/23  1541 03/04/23  0323   SODIUM mmol/L 143 138  --  138   POTASSIUM mmol/L 4.3 5.0 5.8* 5.9*   CHLORIDE mmol/L 111* 107  --  106   CO2 mmol/L 21.1* 21.6*  --  22.1   BUN mg/dL 32* 53*  --  81*   CREATININE mg/dL 0.93 1.06*  --  1.22*   GLUCOSE mg/dL 62* 95  --  70   CALCIUM mg/dL 9.3 9.5  --  9.1       Results from last 7 days   Lab Units 03/04/23  0158 03/03/23  2223   HSTROP T ng/L 33* 36*     Results from last 7 days   Lab Units 03/05/23  0611   WBC 10*3/mm3 5.14   HEMOGLOBIN g/dL 10.9*   HEMATOCRIT % 32.3*   PLATELETS 10*3/mm3 204     Results from last 7 days   Lab Units 03/03/23  2223   INR  2.56*         Results from last 7 days   Lab Units 03/06/23  0602   MAGNESIUM mg/dL 1.7         Results from last 7 days   Lab Units 03/03/23  2223   PROBNP pg/mL 803.0         Results from last 7 days   Lab Units 03/03/23  2223   TSH uIU/mL 7.460*       Imaging:    Imaging Results (Most Recent)     Procedure Component Value Units Date/Time    CT Chest Without Contrast Diagnostic [[id number]] Collected: 03/04/23 0445     Updated: 03/04/23 0507    Narrative:      CT OF THE CHEST WITHOUT CONTRAST     HISTORY: Shortness of breath     COMPARISON: 08/31/2022     TECHNIQUE: Axial CT imaging was obtained through the thorax. No IV  contrast was administered.     FINDINGS:  The patient has multiple tiny micronodules throughout the right lung,  which are new when compared to prior exam. There are  also some scattered  groundglass infiltrates throughout the right lung. There is an area of  nodularity is noted within the right middle lobe, abutting the minor  fissure, which measures 1.5 x 1.1 cm. Further nodularity is noted within  the right upper lobe, abutting the major fissure. There are trace  bilateral pleural effusions. There is scarring and atelectasis within  the left lung. Patient is again noted to have a large AVM at the left  lung base. This is poorly assessed on this unenhanced CT, but the nidus  measures at least 3.2 x 2.7 cm, which is similar to the prior study. The  thyroid gland and trachea are within normal limits. There is a small  hiatal hernia. Main pulmonary artery is enlarged, which can be seen in  the setting of pulmonary arterial hypertension. Thoracic aorta is normal  in caliber. Mediastinal lymph nodes do not appear pathologically  enlarged. Images through the demonstrate periportal/ton hepatis edema.  Gallbladder is absent per history. GI tract is poorly assessed on an  unenhanced exam, but correlation with any evidence of  gastritis/duodenitis is suggested. No acute osseous abnormalities are  seen.       Impression:         1. The patient has multifocal areas of nodularity and groundglass  infiltrate throughout the right lung, including areas of pleural-based  nodularity. Exact etiology is uncertain. While this may represent an  infectious or inflammatory process, neoplasm cannot be completely  excluded. Very short-term CT follow-up is suggested.  2. Trace bilateral pleural effusions.  3. Previously identified large pulmonary AVM at the left lung base  appears similar to the prior study.  4. Periportal/ton hepatis edema, of uncertain clinical significance.  Appearance can be associated with etiologies such as hepatitis and  right-sided heart failure, but correlation with any symptoms of  gastroduodenitis is suggested, given the presence of stranding adjacent  to the distal  stomach and proximal duodenum.     Radiation dose reduction techniques were utilized, including automated  exposure control and exposure modulation based on body size.     This report was finalized on 3/4/2023 5:03 AM by Dr. Melani Ramon M.D.       XR Chest 1 View [[id number]] Collected: 03/03/23 2301     Updated: 03/03/23 2307    Narrative:      SINGLE VIEW OF THE CHEST     HISTORY: Shortness of air     COMPARISON: 08/31/2022     FINDINGS:  Cardiomegaly is present. There is no vascular congestion. No  pneumothorax is seen. Area of nodularity seen at the left lung base,  which corresponds to known AVM. Patient does have blunting of the left  costophrenic angle. Effusion cannot be excluded.       Impression:      Blunting of the left costophrenic angle may be related to chronic  scarring. However, small effusion cannot be excluded.     This report was finalized on 3/3/2023 11:04 PM by Dr. Melani Ramon M.D.             EKG:   As described above    Echocardiogram with normal ejection fraction, mild to moderate LVH    Severe pulmonary hypertension with evidence of septal flattening     Baseline:     I personally viewed and interpreted the patient's EKG/Telemetry tracings.    Assessment:   Assessment & Plan     1. Persistent Atrial Fibrilation  2. Chronic Diastolic Heart Failure  3. Symptomatic Bradycardia   4. Pulmonary Hypertension secondary to left sided heart failure.     Plan:       She has persistent atrial fibrillation and appears to have had what I think is a rare conversion to sinus rhythm that resulted in symptomatic bradycardia.  She has now returned to atrial fibrillation with reasonable rate.   She is stable off inotropic therapy.      No immediate need for pacing therapy.  If she has further episodes of sinus rhythm may need to consider pacing therapy.     Thank you for allowing me to participate in the care of Tamara Singh. Feel free to contact me directly with any further questions or  concerns.    Jayce Lawton MD  03/06/23  20:28 EST

## 2023-03-07 NOTE — TELEPHONE ENCOUNTER
Home health need an verbal Okay pj was sign off on home health orders. Latha was notified pj would.

## 2023-03-07 NOTE — TELEPHONE ENCOUNTER
Can we please clarify what is being asked for?  If it is for a home health order, I agree to orders.

## 2023-03-07 NOTE — PLAN OF CARE
Goal Outcome Evaluation:  Plan of Care Reviewed With: patient, sibling        Progress: improving  Outcome Evaluation: VSS. Pt is on RA. No episodes of symptomatic bradycardia today. Potential overflow patient.

## 2023-03-08 ENCOUNTER — HOME HEALTH ADMISSION (OUTPATIENT)
Dept: HOME HEALTH SERVICES | Facility: HOME HEALTHCARE | Age: 83
End: 2023-03-08
Payer: MEDICARE

## 2023-03-08 ENCOUNTER — TRANSCRIBE ORDERS (OUTPATIENT)
Dept: HOME HEALTH SERVICES | Facility: HOME HEALTHCARE | Age: 83
End: 2023-03-08
Payer: MEDICARE

## 2023-03-08 ENCOUNTER — READMISSION MANAGEMENT (OUTPATIENT)
Dept: CALL CENTER | Facility: HOSPITAL | Age: 83
End: 2023-03-08
Payer: MEDICARE

## 2023-03-08 VITALS
SYSTOLIC BLOOD PRESSURE: 137 MMHG | HEIGHT: 62 IN | DIASTOLIC BLOOD PRESSURE: 78 MMHG | TEMPERATURE: 97.6 F | OXYGEN SATURATION: 96 % | HEART RATE: 52 BPM | WEIGHT: 200 LBS | BODY MASS INDEX: 36.8 KG/M2 | RESPIRATION RATE: 18 BRPM

## 2023-03-08 DIAGNOSIS — I48.91 ATRIAL FIBRILLATION, UNSPECIFIED TYPE: ICD-10-CM

## 2023-03-08 DIAGNOSIS — R00.1 BRADYCARDIA: ICD-10-CM

## 2023-03-08 DIAGNOSIS — J18.9 PNEUMONIA OF RIGHT LUNG DUE TO INFECTIOUS ORGANISM, UNSPECIFIED PART OF LUNG: Primary | ICD-10-CM

## 2023-03-08 RX ADMIN — FUROSEMIDE 40 MG: 40 TABLET ORAL at 08:25

## 2023-03-08 RX ADMIN — Medication 10 ML: at 08:25

## 2023-03-08 RX ADMIN — DOCUSATE SODIUM 50MG AND SENNOSIDES 8.6MG 1 TABLET: 8.6; 5 TABLET, FILM COATED ORAL at 08:25

## 2023-03-08 NOTE — CASE MANAGEMENT/SOCIAL WORK
Case Management Discharge Note      Final Note: Home via family and [facility] , no additional CCP needs. Teddy RN/CCP         Selected Continued Care - Admitted Since 3/3/2023     Destination    No services have been selected for the patient.              Durable Medical Equipment    No services have been selected for the patient.              Dialysis/Infusion    No services have been selected for the patient.              Home Medical Care     Service Provider Selected Services Address Phone Fax Patient Preferred     [facility] ,  [facility] [address] [place] [id number] [phone] --          Therapy    No services have been selected for the patient.              Community Resources    No services have been selected for the patient.              Community & DME    No services have been selected for the patient.                       Final Discharge Disposition Code: 06 - home with home health care

## 2023-03-08 NOTE — PLAN OF CARE
VSS, no c/o pain. Xfer to tele today from ICU. Remains bradycardic, but asymptomatic. Up to BSC, urinating spontaneously w/o difficulty as well as BM this shift. On room air. Alert/oriented. No c/o pain. Plan for potential d/c in AM home w/home health. Nephro/cardio signed off. Continue current plan of care.       Problem: Adult Inpatient Plan of Care  Goal: Plan of Care Review  Outcome: Ongoing, Progressing  Goal: Patient-Specific Goal (Individualized)  Outcome: Ongoing, Progressing  Goal: Absence of Hospital-Acquired Illness or Injury  Outcome: Ongoing, Progressing  Intervention: Identify and Manage Fall Risk  Recent Flowsheet Documentation  Taken 3/8/2023 0400 by Gonzalo Blanca RN  Safety Promotion/Fall Prevention: safety round/check completed  Taken 3/8/2023 0230 by Gonzalo Blanca RN  Safety Promotion/Fall Prevention: safety round/check completed  Taken 3/8/2023 0013 by Gonzalo Blanca RN  Safety Promotion/Fall Prevention: safety round/check completed  Taken 3/7/2023 2255 by Gonzalo Blanca RN  Safety Promotion/Fall Prevention: safety round/check completed  Taken 3/7/2023 2150 by Gonzalo Blanca RN  Safety Promotion/Fall Prevention: safety round/check completed  Intervention: Prevent Skin Injury  Recent Flowsheet Documentation  Taken 3/8/2023 0013 by Gonzalo Blanca RN  Body Position: position changed independently  Taken 3/7/2023 2150 by Gonzalo Blanca RN  Body Position: position changed independently  Intervention: Prevent and Manage VTE (Venous Thromboembolism) Risk  Recent Flowsheet Documentation  Taken 3/8/2023 0400 by Gonzalo Blanca RN  Activity Management: activity adjusted per tolerance  Taken 3/8/2023 0230 by Gonzalo Blanca RN  Activity Management: activity adjusted per tolerance  Taken 3/8/2023 0013 by Gonzalo Blanca RN  Activity Management: activity adjusted per tolerance  Taken 3/7/2023 2255 by Gonzalo Blanca RN  Activity Management:   activity  adjusted per tolerance   up to bedside commode   back to bed  Taken 3/7/2023 2150 by Gonzalo Blanca, RN  Activity Management: activity adjusted per tolerance  VTE Prevention/Management: (pt on NOAC) sequential compression devices off  Goal: Optimal Comfort and Wellbeing  Outcome: Ongoing, Progressing  Intervention: Provide Person-Centered Care  Recent Flowsheet Documentation  Taken 3/7/2023 2150 by Gonzalo Blanca, RN  Trust Relationship/Rapport:   care explained   reassurance provided  Goal: Readiness for Transition of Care  Outcome: Ongoing, Progressing   Goal Outcome Evaluation:

## 2023-03-08 NOTE — DISCHARGE SUMMARY
PHYSICIAN DISCHARGE SUMMARY                                                                        [facility]    Date of admit: 3/3/2023  Date of Discharge:  3/8/2023    PCP: Yane Lama APRN    Discharge Diagnosis:     Symptomatic bradycardia  1. Symptomatic bradycardia  2. Hyperkalemia, potassium 6.1  3. Acute kidney injury  4. Hypothermia  5. Stable left lower lobe AVM  6. Right multifocal infiltrate/pneumonia  7. Obstructive sleep apnea, not currently on PAP therapy  8. Atrial fibrillation  9. Grade 2 diastolic dysfunction  10. Moderate pulm hypertension  11. Chronic anticoagulation with Xarelto  12. Chronic back pain on narcotics  Obesity (BMI 38)    Secondary Diagnoses:  Past Medical History:   Diagnosis Date   • Abnormal ECG 06/05/2021   • Anxiety    • Arthritis    • Asthma     Used to use inhaler but dont anymore.   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 female presented with per H&P:     CC: weakness, shortness of breath     History of Present Illness:  Tamara Singh is an 83 year old female with a past medical history of hypertension, chronic back pain, atrial fibrillation on Xarelto, and pulmonary hypertension.  She has no history of coronary artery disease, congestive heart failure, or valvular disease.  She is previously been diagnosed with sleep apnea, but does not currently use CPAP.  Patient has a known stable left lower lobe AVM, stable on CT chest from August 2022.     She presented to the emergency  department with chief complaint of shortness of breath.  Patient reports she has had increased weakness, increasing shortness of breath, and increased swelling to her legs over the past couple weeks.  She denies fever, cough or chest pain. ED evaluation revealed hyperkalemia with a potassium of 6.1, creatinine 1.64 (previously 0.97 five months ago), sodium 134, TSH 7.460, and hemoglobin 11.6.  Urinalysis was normal.  EKG on arrival showed junctional rhythm with a left bundle branch block with a heart rate of 31.  Chest x-ray showed blunting of left costophrenic angle, possible related to chronic scarring.  Patient denies change in urinary or bowel habits.  She reports compliance with Lasix, denies use of supplemental potassium chloride or potassium sparing diuretics.        Patient was admitted with symptomatic bradycardia and hyperkalemia.  Was seen in consultation by cardiology, EP cardiology service and nephrology.  Potassium corrected.  They did not feel that the potassium was the cause of her bradycardia.  Required dopamine for the first couple days but able to wean that off after that and has been stable.  Still with episodic bradycardia but cardiology has been evaluating and no plans for pacemaker and they have cleared her for discharge.  Plan for follow-up with primary care and cardiology as an outpatient.  Trying to pare down some of her home medications for simplicity.    Condition on Discharge:  Stable.      Vital Signs  Temp:  [97.5 °F (36.4 °C)-98.2 °F (36.8 °C)] 97.6 °F (36.4 °C)  Heart Rate:  [33-61] 59  Resp:  [12-18] 18  BP: (123-159)/() 137/76    Physical Exam:  General Appearance: no acute distress, appears comfortable  Heart: regular rate and rhythm  Lungs: clear to auscultation bilaterally, respirations unlabored  Abdomen: soft, nontender, nondistended, no guarding/rebound, nl BS  Extremeties: no edema, no cyanosis  Neurology: speech normal, mental status normal, moving all four  extremities  Mental Status: alert, oriented        --  Consults:   IP CONSULT TO HOSPITALIST  IP CONSULT TO CARDIOLOGY  IP CONSULT TO PULMONOLOGY  IP CONSULT TO CARDIOLOGY  IP CONSULT TO NEPHROLOGY  IP CONSULT TO CARDIAC ELECTROPHYSIOLOGIST    Significant Discharge Diagnostics   Procedures Performed:         Pertinent Lab Results:  Results from last 7 days   Lab Units 03/07/23  0634 03/06/23  0602 03/05/23  0611 03/04/23  1541 03/04/23  0323 03/04/23  0132 03/03/23  2223   SODIUM mmol/L 142 143 138  --  138 136 134*   POTASSIUM mmol/L 4.0 4.3 5.0 5.8* 5.9* 5.6* 6.1*   CHLORIDE mmol/L 113* 111* 107  --  106 105 100   CO2 mmol/L 21.0* 21.1* 21.6*  --  22.1 22.1 20.0*   BUN mg/dL 23 32* 53*  --  81* 79* 86*   CREATININE mg/dL 0.90 0.93 1.06*  --  1.22* 1.26* 1.64*   GLUCOSE mg/dL 67 62* 95  --  70 24* 134*   CALCIUM mg/dL 9.0 9.3 9.5  --  9.1 9.1 9.4   AST (SGOT) U/L  --   --   --   --   --   --  40*   ALT (SGPT) U/L  --   --   --   --   --   --  42*     Results from last 7 days   Lab Units 03/04/23  0158 03/03/23  2223   HSTROP T ng/L 33* 36*     Results from last 7 days   Lab Units 03/05/23  0611 03/04/23  0323 03/04/23  0132 03/03/23  2223   WBC 10*3/mm3 5.14 4.86 5.49 4.74   HEMOGLOBIN g/dL 10.9* 10.6* 10.7* 11.6*   HEMATOCRIT % 32.3* 31.4* 31.6* 33.0*   PLATELETS 10*3/mm3 204 199 184 223   MCV fL 89.5 89.2 90.3 88.0   MCH pg 30.2 30.1 30.6 30.9   MCHC g/dL 33.7 33.8 33.9 35.2   RDW % 18.0* 18.4* 18.6* 18.2*   RDW-SD fl 58.8* 59.8* 61.0* 58.4*   MPV fL 9.4 9.7 9.8 9.4   NEUTROPHIL % % 61.8 77.2* 68.3 44.7   LYMPHOCYTE % % 21.4 13.6* 21.7 45.4*   MONOCYTES % % 15.2* 8.2 8.9 8.2   EOSINOPHIL % % 1.0 0.4 0.4 1.3   BASOPHIL % % 0.2 0.2 0.2 0.2   IMM GRAN % % 0.4 0.4 0.5 0.2   NEUTROS ABS 10*3/mm3 3.18 3.75 3.75 2.12   LYMPHS ABS 10*3/mm3 1.10 0.66* 1.19 2.15   MONOS ABS 10*3/mm3 0.78 0.40 0.49 0.39   EOS ABS 10*3/mm3 0.05 0.02 0.02 0.06   BASOS ABS 10*3/mm3 0.01 0.01 0.01 0.01   IMMATURE GRANS (ABS) 10*3/mm3 0.02 0.02  0.03 0.01   NRBC /100 WBC 0.2 0.0 0.0 0.0     Results from last 7 days   Lab Units 03/03/23  2223   INR  2.56*     Results from last 7 days   Lab Units 03/07/23  0634 03/06/23  0602 03/05/23  0611 03/04/23  0323 03/04/23  0132 03/03/23  2223   MAGNESIUM mg/dL 2.7* 1.7 2.1 2.5* 2.4 2.7*           Invalid input(s): LDLCALC  Results from last 7 days   Lab Units 03/03/23  2223   PROBNP pg/mL 803.0     Results from last 7 days   Lab Units 03/03/23  2223   TSH uIU/mL 7.460*         Results from last 7 days   Lab Units 03/04/23  0323 03/04/23  0027   PROCALCITONIN ng/mL 0.11 0.09   LACTATE mmol/L 1.0  --              Results from last 7 days   Lab Units 03/04/23  0555   BLOODCX  No growth at 4 days  No growth at 4 days     Results from last 7 days   Lab Units 03/03/23  2238   NITRITE UA  Negative         Results from last 7 days   Lab Units 03/04/23  1525   SODIUM UR mmol/L 41   CREATININE UR mg/dL 34.8       Imaging Results:  Imaging Results (All)     Procedure Component Value Units Date/Time    CT Chest Without Contrast Diagnostic [[id number]] Collected: 03/04/23 0445     Updated: 03/04/23 0507    Narrative:      CT OF THE CHEST WITHOUT CONTRAST     HISTORY: Shortness of breath     COMPARISON: 08/31/2022     TECHNIQUE: Axial CT imaging was obtained through the thorax. No IV  contrast was administered.     FINDINGS:  The patient has multiple tiny micronodules throughout the right lung,  which are new when compared to prior exam. There are also some scattered  groundglass infiltrates throughout the right lung. There is an area of  nodularity is noted within the right middle lobe, abutting the minor  fissure, which measures 1.5 x 1.1 cm. Further nodularity is noted within  the right upper lobe, abutting the major fissure. There are trace  bilateral pleural effusions. There is scarring and atelectasis within  the left lung. Patient is again noted to have a large AVM at the left  lung base. This is poorly assessed on this  unenhanced CT, but the nidus  measures at least 3.2 x 2.7 cm, which is similar to the prior study. The  thyroid gland and trachea are within normal limits. There is a small  hiatal hernia. Main pulmonary artery is enlarged, which can be seen in  the setting of pulmonary arterial hypertension. Thoracic aorta is normal  in caliber. Mediastinal lymph nodes do not appear pathologically  enlarged. Images through the demonstrate periportal/ton hepatis edema.  Gallbladder is absent per history. GI tract is poorly assessed on an  unenhanced exam, but correlation with any evidence of  gastritis/duodenitis is suggested. No acute osseous abnormalities are  seen.       Impression:         1. The patient has multifocal areas of nodularity and groundglass  infiltrate throughout the right lung, including areas of pleural-based  nodularity. Exact etiology is uncertain. While this may represent an  infectious or inflammatory process, neoplasm cannot be completely  excluded. Very short-term CT follow-up is suggested.  2. Trace bilateral pleural effusions.  3. Previously identified large pulmonary AVM at the left lung base  appears similar to the prior study.  4. Periportal/ton hepatis edema, of uncertain clinical significance.  Appearance can be associated with etiologies such as hepatitis and  right-sided heart failure, but correlation with any symptoms of  gastroduodenitis is suggested, given the presence of stranding adjacent  to the distal stomach and proximal duodenum.     Radiation dose reduction techniques were utilized, including automated  exposure control and exposure modulation based on body size.     This report was finalized on 3/4/2023 5:03 AM by Dr. Melani Ramon M.D.       XR Chest 1 View [[id number]] Collected: 03/03/23 2301     Updated: 03/03/23 2307    Narrative:      SINGLE VIEW OF THE CHEST     HISTORY: Shortness of air     COMPARISON: 08/31/2022     FINDINGS:  Cardiomegaly is present. There is no  vascular congestion. No  pneumothorax is seen. Area of nodularity seen at the left lung base,  which corresponds to known AVM. Patient does have blunting of the left  costophrenic angle. Effusion cannot be excluded.       Impression:      Blunting of the left costophrenic angle may be related to chronic  scarring. However, small effusion cannot be excluded.     This report was finalized on 3/3/2023 11:04 PM by Dr. Melani Ramon M.D.           --    Discharge Disposition  Home or Self Care    Discharge Medications     Discharge Medications      Continue These Medications      Instructions Start Date   furosemide 40 MG tablet  Commonly known as: Lasix   40 mg, Oral, Daily      ketorolac 0.5 % ophthalmic solution  Commonly known as: ACULAR   INSTILL 1 DROP INTO LEFT EYE 4 TIMES A DAY      rivaroxaban 20 MG tablet  Commonly known as: Xarelto   20 mg, Oral, Daily         Stop These Medications    amLODIPine-benazepril 10-40 MG per capsule  Commonly known as: LOTREL     carisoprodol 350 MG tablet  Commonly known as: SOMA     HYDROcodone-acetaminophen 5-325 MG per tablet  Commonly known as: NORCO     hydrOXYzine 10 MG tablet  Commonly known as: ATARAX     multivitamins-minerals capsule capsule     silver sulfadiazine 1 % cream  Commonly known as: SILVADENE, SSD     Vitamin D (Cholecalciferol) 50 MCG (2000 UT) capsule            Discharge Diet: regular diet    Activity at Discharge:      Contact information for follow-up providers     Yane Lama APRN .    Specialties: Family Medicine, Nurse Practitioner  Contact information:  [address]  [place] [address]  [phone]             Evens Miller III, MD Follow up in 2 week(s).    Specialty: Cardiology  Contact information:  [address]  [place] [address]  [phone]                   Contact information for after-discharge care     Home Medical Care     [facility] .    Services: Home Rehabilitation, Home  Health Services  Contact information:  [address]  [facility] [address] [phone]                           See primary care provider, Yane Lama APRN, in 1-2 weeks        Test Results Pending at Discharge  Pending Labs     Order Current Status    Blood Culture - Blood, Arm, Right Preliminary result    Blood Culture - Blood, Hand, Left Preliminary result           Jose Holcomb MD  [facility], [facility]  Pulmonary and Critical Care Medicine  03/08/23  09:46 EST    Time: greater than 30 minutes.        Total time spent discharging patient including evaluation, post hospitalization follow up, medication and post hospitalization instructions and education total time exceeds 30 minutes.

## 2023-03-08 NOTE — PROGRESS NOTES
[facility] following for home care needs.  Transcribed SN and PT per Yane MORILLO referral authorized by [employer].  Spoke with patient and she is agreeable for our services, verified all info.  Denies recent or current home health involvement.

## 2023-03-08 NOTE — CASE MANAGEMENT/SOCIAL WORK
Continued Stay Note  [facility]     Patient Name: Tamara Singh  MRN: [id number]  Today's Date: 3/8/2023    Admit Date: 3/3/2023    Plan: Home via family and [facility]    Discharge Plan     Row Name 03/08/23 0957       Plan    Plan Home via family and [facility]     Patient/Family in Agreement with Plan yes    Plan Comments DC order noted. CCP met with pt at bedside to discuss DC. Pt confirmed DC plan is to return home, family to transport around 12 today. LVM for [facility]  notifying of DC. Teddy RN/CCP    Final Discharge Disposition Code 06 - home with home health care    Final Note Home via family and [facility] , no additional CCP needs. Teddy RN/CCP               Discharge Codes    No documentation.               Expected Discharge Date and Time     Expected Discharge Date Expected Discharge Time    Mar 8, 2023             Babs Garcia RN

## 2023-03-08 NOTE — OUTREACH NOTE
Prep Survey    Flowsheet Row Responses   [facility] patient discharged from? [place]   Is LACE score < 7 ? No   Eligibility [facility]   Date of Admission 03/03/23   Date of Discharge 03/08/23   Discharge Disposition Home or Self Care   Discharge diagnosis Symptomatic bradycardia   Does the patient have one of the following disease processes/diagnoses(primary or secondary)? Other   Does the patient have Home health ordered? Yes   What is the Home health agency?   [facility]    Is there a DME ordered? No   Prep survey completed? Yes          Shanthi MCKEON - Registered Nurse

## 2023-03-08 NOTE — PLAN OF CARE
Goal Outcome Evaluation:  Plan of Care Reviewed With: patient, sibling        Progress: improving  Outcome Evaluation: VSS. No episodes of symptomatic bradycardia. Pt worked with PT today and was tolerated well, see note. Pt is in overflow. Care plan reviewed with patient and sister, understood and accepted.

## 2023-03-09 ENCOUNTER — HOME CARE VISIT (OUTPATIENT)
Dept: HOME HEALTH SERVICES | Facility: HOME HEALTHCARE | Age: 83
End: 2023-03-09
Payer: MEDICARE

## 2023-03-09 ENCOUNTER — TRANSITIONAL CARE MANAGEMENT TELEPHONE ENCOUNTER (OUTPATIENT)
Dept: CALL CENTER | Facility: HOSPITAL | Age: 83
End: 2023-03-09
Payer: MEDICARE

## 2023-03-09 LAB
BACTERIA SPEC AEROBE CULT: NORMAL
BACTERIA SPEC AEROBE CULT: NORMAL

## 2023-03-09 PROCEDURE — G0299 HHS/HOSPICE OF RN EA 15 MIN: HCPCS

## 2023-03-09 NOTE — OUTREACH NOTE
Call Center TCM Note    Flowsheet Row Responses   [facility] patient discharged from? [place]   Does the patient have one of the following disease processes/diagnoses(primary or secondary)? Other   TCM attempt successful? Yes   Call start time 1458   Call end time 1503   Meds reviewed with patient/caregiver? Yes   Is the patient having any side effects they believe may be caused by any medication additions or changes? No   Does the patient have all medications ordered at discharge? N/A   Is the patient taking all medications as directed (includes completed medication regime)? Yes   Comments Patient states will have to call PCP's office for hospital f/u appt after arranging transportation with her sister. PCP appt 06/23/23.   Does the patient have an appointment with their PCP within 7 days of discharge? No   Nursing Interventions Patient declined scheduling/rescheduling appointment at this time, Routed TCM call to PCP office   Has home health visited the patient within 72 hours of discharge? Call prior to 72 hours   Home health comments States HH nurse will visit this afternoon.   Psychosocial issues? No   Did the patient receive a copy of their discharge instructions? Yes   Nursing interventions Reviewed instructions with patient   What is the patient's perception of their health status since discharge? Improving   Is the patient/caregiver able to teach back signs and symptoms related to disease process for when to call PCP? Yes   Is the patient/caregiver able to teach back signs and symptoms related to disease process for when to call 911? Yes   Is the patient/caregiver able to teach back the hierarchy of who to call/visit for symptoms/problems? PCP, Specialist, Home health nurse, Urgent Care, ED, 911 Yes   If the patient is a current smoker, are they able to teach back resources for cessation? Not a smoker   TCM call completed? Yes   Wrap up additional comments Patient states is improving. Denies any  SOA, chest pain or edema. States will discuss resuming her AREDS vitiamins with her PCP. Denies any needs/concerns today.   Call end time 1503   Would this patient benefit from a Referral to [facility] Social Work? No   Is the patient interested in additional calls from an ambulatory ?  NOTE:  applies to high risk patients requiring additional follow-up. No          Birdie Nunn RN    3/9/2023, 15:04 EST

## 2023-03-10 ENCOUNTER — HOME CARE VISIT (OUTPATIENT)
Dept: HOME HEALTH SERVICES | Facility: HOME HEALTHCARE | Age: 83
End: 2023-03-10
Payer: MEDICARE

## 2023-03-10 NOTE — HOME HEALTH
SOC 3/9  DIAG: AFIB.   HX: HTN, CHRONIC BACK PAIN, AFIB ON XARELTO, CAD AND PULMONARY HYPERTENSION,   Patient is a 82 yo female with a past medical history of hypertension, chronic back pain, atrial fibrillation on Xarelto, and pulmonary hypertension. She has no history of coronary artery disease, congestive heart failure, or valvular disease. She is previously been diagnosed with sleep apnea, but does not currently use CPAP. She went to the ER with c/o increased weaknes, soa, swelling to legs over the last few weeks. She was determined to have hyperkalemia with a potassium of 6.1, EKG showed a left bundle branch block with a heart rate of 31. She was found to symptomatic bradycardia and hyperkalemia,  No plans for pacemaker at this time.   She is up with walker, has C/O Pain to her back that is chronic for her.   she is taking tylenol as needed.     FOCUS OF CARE: T/I ON DISEASE PROCESS FOR AFIB, HOME MED MGMT AND HOME SAFETY

## 2023-03-12 VITALS
HEART RATE: 58 BPM | DIASTOLIC BLOOD PRESSURE: 76 MMHG | OXYGEN SATURATION: 97 % | SYSTOLIC BLOOD PRESSURE: 122 MMHG | RESPIRATION RATE: 20 BRPM | TEMPERATURE: 98.3 F

## 2023-03-13 ENCOUNTER — HOME CARE VISIT (OUTPATIENT)
Dept: HOME HEALTH SERVICES | Facility: HOME HEALTHCARE | Age: 83
End: 2023-03-13
Payer: MEDICARE

## 2023-03-13 PROCEDURE — G0151 HHCP-SERV OF PT,EA 15 MIN: HCPCS

## 2023-03-13 NOTE — CASE COMMUNICATION
SOC 3/9   DIAG: AFIB.   HX: HTN, CHRONIC BACK PAIN, AFIB ON XARELTO, CAD AND PULMONARY HYPERTENSION,   Patient is a 84 yo female with a past medical history of hypertension, chronic back pain, atrial fibrillation on Xarelto, and pulmonary hypertension. She has no history of coronary artery disease, congestive heart failure, or valvular disease. She is previously been diagnosed with sleep apnea, but does not currently use CPAP. She went  t o the ER with c/o increased weaknes, soa, swelling to legs over the last few weeks. She was determined to have hyperkalemia with a potassium of 6.1, EKG showed a left bundle branch block with a heart rate of 31. She was found to symptomatic bradycardia and hyperkalemia, No plans for pacemaker at this time.   She is up with walker, has C/O Pain to her back that is chronic for her.   she is taking tylenol as needed.     FOCUS OF CARE: T / I ON DISEASE PROCESS FOR AFIB, HOME MED MGMT AND HOME SAFETY

## 2023-03-15 ENCOUNTER — HOME CARE VISIT (OUTPATIENT)
Dept: HOME HEALTH SERVICES | Facility: HOME HEALTHCARE | Age: 83
End: 2023-03-15
Payer: MEDICARE

## 2023-03-15 VITALS
DIASTOLIC BLOOD PRESSURE: 86 MMHG | OXYGEN SATURATION: 97 % | SYSTOLIC BLOOD PRESSURE: 144 MMHG | TEMPERATURE: 97.4 F | HEART RATE: 63 BPM

## 2023-03-15 VITALS
DIASTOLIC BLOOD PRESSURE: 72 MMHG | SYSTOLIC BLOOD PRESSURE: 132 MMHG | TEMPERATURE: 97.5 F | HEART RATE: 62 BPM | OXYGEN SATURATION: 98 % | RESPIRATION RATE: 18 BRPM

## 2023-03-15 PROCEDURE — G0300 HHS/HOSPICE OF LPN EA 15 MIN: HCPCS

## 2023-03-16 NOTE — HOME HEALTH
Patient is CP assess r/t recent hosp stay due to AFIB- Teach on Afib and what to monitor for.  Teach about keeping legs up to help reduce swelling.

## 2023-03-17 ENCOUNTER — HOME CARE VISIT (OUTPATIENT)
Dept: HOME HEALTH SERVICES | Facility: HOME HEALTHCARE | Age: 83
End: 2023-03-17
Payer: MEDICARE

## 2023-03-17 PROCEDURE — G0300 HHS/HOSPICE OF LPN EA 15 MIN: HCPCS

## 2023-03-18 VITALS
HEART RATE: 48 BPM | SYSTOLIC BLOOD PRESSURE: 126 MMHG | RESPIRATION RATE: 18 BRPM | OXYGEN SATURATION: 98 % | TEMPERATURE: 98.1 F | DIASTOLIC BLOOD PRESSURE: 68 MMHG

## 2023-03-20 LAB
GLUCOSE BLDC GLUCOMTR-MCNC: 107 MG/DL (ref 70–130)
GLUCOSE BLDC GLUCOMTR-MCNC: 125 MG/DL (ref 70–130)
GLUCOSE BLDC GLUCOMTR-MCNC: 75 MG/DL (ref 70–130)
GLUCOSE BLDC GLUCOMTR-MCNC: 77 MG/DL (ref 70–130)
GLUCOSE BLDC GLUCOMTR-MCNC: 79 MG/DL (ref 70–130)
GLUCOSE BLDC GLUCOMTR-MCNC: 86 MG/DL (ref 70–130)

## 2023-03-22 ENCOUNTER — HOME CARE VISIT (OUTPATIENT)
Dept: HOME HEALTH SERVICES | Facility: HOME HEALTHCARE | Age: 83
End: 2023-03-22
Payer: MEDICARE

## 2023-03-22 PROCEDURE — G0151 HHCP-SERV OF PT,EA 15 MIN: HCPCS

## 2023-03-23 ENCOUNTER — HOME CARE VISIT (OUTPATIENT)
Dept: HOME HEALTH SERVICES | Facility: HOME HEALTHCARE | Age: 83
End: 2023-03-23
Payer: MEDICARE

## 2023-03-23 VITALS
HEART RATE: 60 BPM | SYSTOLIC BLOOD PRESSURE: 128 MMHG | DIASTOLIC BLOOD PRESSURE: 80 MMHG | OXYGEN SATURATION: 95 % | TEMPERATURE: 97.8 F

## 2023-03-23 VITALS
DIASTOLIC BLOOD PRESSURE: 76 MMHG | TEMPERATURE: 98.4 F | HEART RATE: 76 BPM | SYSTOLIC BLOOD PRESSURE: 118 MMHG | RESPIRATION RATE: 18 BRPM | OXYGEN SATURATION: 97 %

## 2023-03-23 PROCEDURE — G0299 HHS/HOSPICE OF RN EA 15 MIN: HCPCS

## 2023-03-23 NOTE — HOME HEALTH
Elkeeint reports doing well.  Doing everything for herself and walking with no AD.  She states her HR does race at times, but not much.  She is pleased with her progress and requested discharge today vs next week from therapy.  PT dc today.

## 2023-03-24 ENCOUNTER — OFFICE VISIT (OUTPATIENT)
Dept: CARDIOLOGY | Facility: CLINIC | Age: 83
End: 2023-03-24
Payer: MEDICARE

## 2023-03-24 VITALS
BODY MASS INDEX: 35.3 KG/M2 | WEIGHT: 191.8 LBS | SYSTOLIC BLOOD PRESSURE: 130 MMHG | HEIGHT: 62 IN | DIASTOLIC BLOOD PRESSURE: 83 MMHG

## 2023-03-24 DIAGNOSIS — I10 ESSENTIAL HYPERTENSION: ICD-10-CM

## 2023-03-24 DIAGNOSIS — I89.0 LYMPHEDEMA: ICD-10-CM

## 2023-03-24 DIAGNOSIS — I48.19 ATRIAL FIBRILLATION, PERSISTENT: Primary | ICD-10-CM

## 2023-03-24 DIAGNOSIS — G47.33 OBSTRUCTIVE SLEEP APNEA SYNDROME: ICD-10-CM

## 2023-03-24 PROCEDURE — 3075F SYST BP GE 130 - 139MM HG: CPT | Performed by: INTERNAL MEDICINE

## 2023-03-24 PROCEDURE — 3079F DIAST BP 80-89 MM HG: CPT | Performed by: INTERNAL MEDICINE

## 2023-03-24 PROCEDURE — 1159F MED LIST DOCD IN RCRD: CPT | Performed by: INTERNAL MEDICINE

## 2023-03-24 PROCEDURE — 99214 OFFICE O/P EST MOD 30 MIN: CPT | Performed by: INTERNAL MEDICINE

## 2023-03-24 PROCEDURE — 1160F RVW MEDS BY RX/DR IN RCRD: CPT | Performed by: INTERNAL MEDICINE

## 2023-03-24 PROCEDURE — 93000 ELECTROCARDIOGRAM COMPLETE: CPT | Performed by: INTERNAL MEDICINE

## 2023-03-24 RX ORDER — MULTIPLE VITAMINS W/ MINERALS TAB 9MG-400MCG
1 TAB ORAL 2 TIMES DAILY
COMMUNITY

## 2023-03-24 NOTE — PROGRESS NOTES
Subjective:     Encounter Date:03/24/23      Patient ID: Tamara Singh is a 83 y.o. female.    Chief Complaint:  History of Present Illness    Dear Dr. Sauceda,    I had the pleasure of seeing this patient in the office today for persistent atrial fibrillation.    Patient comes in today for hospital follow-up.  Patient was recently hospitalized with weakness.  She was hypothermic.  We are consult to see her while she was there.  Dr. Lawton did evaluate her because with hypothermia there was some bradycardia initially there was a question of if she needed a pacemaker.  Once the hypothermia resolved then heart rate resolved.  She did not meet any indication for pacemaker.  Since going home she has been doing better.    She has a history of sleep apnea but is not currently using CPAP.  She thinks is probably been 3 years since she was on CPAP.  She had lost some weight and so she thought she may not need it.      This patient has no other known cardiac history.  This patient has no history of coronary artery disease, congestive heart failure, rheumatic fever, rheumatic heart disease, congenital heart disease or heart murmur.  This patient has never required invasive cardiovascular evaluation.    The following portions of the patient's history were reviewed and updated as appropriate: allergies, current medications, past family history, past medical history, past social history, past surgical history and problem list.      ECG 12 Lead    Date/Time: 3/24/2023 2:45 PM  Performed by: Evens Miller III, MD  Authorized by: Evens Miller III, MD   Comparison: compared with previous ECG   Similar to previous ECG  Rhythm: atrial fibrillation  Rate: normal  Conduction: conduction normal  QRS axis: normal  Other findings: non-specific ST-T wave changes    Clinical impression: abnormal EKG               Objective:     Vitals:    03/24/23 1424   BP: 130/83   BP Location: Right arm   Patient Position: Sitting   Weight: 87 kg  "(191 lb 12.8 oz)   Height: 157.5 cm (62\")     Body mass index is 35.08 kg/m².      Vitals reviewed.   Constitutional:       General: Not in acute distress.     Appearance: Well-developed. Not diaphoretic.   Eyes:      General:         Right eye: No discharge.         Left eye: No discharge.      Conjunctiva/sclera: Conjunctivae normal.      Pupils: Pupils are equal, round, and reactive to light.   HENT:      Head: Normocephalic and atraumatic.      Nose: Nose normal.   Neck:      Thyroid: No thyromegaly.      Trachea: No tracheal deviation.      Lymphadenopathy: No cervical adenopathy.   Pulmonary:      Effort: Pulmonary effort is normal. No respiratory distress.      Breath sounds: Normal breath sounds. No stridor.   Chest:      Chest wall: Not tender to palpatation.   Cardiovascular:      Normal rate. Irregular rhythm.      Murmurs: There is no murmur.      . No S3 gallop. No click. No rub.   Pulses:     Intact distal pulses.   Edema:     Peripheral edema present.  Abdominal:      General: Bowel sounds are normal. There is no distension.      Palpations: Abdomen is soft. There is no abdominal mass.      Tenderness: There is no abdominal tenderness. There is no guarding or rebound.   Musculoskeletal: Normal range of motion.         General: No tenderness or deformity.      Cervical back: Normal range of motion and neck supple. Skin:     General: Skin is warm and dry.      Findings: No erythema or rash.   Neurological:      Mental Status: Alert and oriented to person, place, and time.      Deep Tendon Reflexes: Reflexes are normal and symmetric.   Psychiatric:         Thought Content: Thought content normal.         Data and records reviewed:     Lab Results   Component Value Date    GLUCOSE 67 03/07/2023    BUN 23 03/07/2023    CREATININE 0.90 03/07/2023    EGFRIFNONA 77 11/24/2021    EGFRIFAFRI 88 11/24/2021    BCR 25.6 (H) 03/07/2023    K 4.0 03/07/2023    CO2 21.0 (L) 03/07/2023    CALCIUM 9.0 03/07/2023    " PROTENTOTREF 7.8 10/03/2022    ALBUMIN 3.1 (L) 03/07/2023    LABIL2 1.6 10/03/2022    AST 40 (H) 03/03/2023    ALT 42 (H) 03/03/2023     No results found for: CHOL  Lab Results   Component Value Date    TRIG 60 09/16/2022     Lab Results   Component Value Date     (H) 09/16/2022     Lab Results   Component Value Date    LDL 43 09/16/2022     Lab Results   Component Value Date    VLDL 11 09/16/2022     No results found for: LDLHDL  CBC    CBC 3/3/23 3/4/23 3/4/23 3/5/23     0132 0323    WBC 4.74 5.49 4.86 5.14   RBC 3.75 (A) 3.50 (A) 3.52 (A) 3.61 (A)   Hemoglobin 11.6 (A) 10.7 (A) 10.6 (A) 10.9 (A)   Hematocrit 33.0 (A) 31.6 (A) 31.4 (A) 32.3 (A)   MCV 88.0 90.3 89.2 89.5   MCH 30.9 30.6 30.1 30.2   MCHC 35.2 33.9 33.8 33.7   RDW 18.2 (A) 18.6 (A) 18.4 (A) 18.0 (A)   Platelets 223 184 199 204   (A) Abnormal value            CT Chest Without Contrast Diagnostic    Result Date: 3/4/2023   1. The patient has multifocal areas of nodularity and groundglass infiltrate throughout the right lung, including areas of pleural-based nodularity. Exact etiology is uncertain. While this may represent an infectious or inflammatory process, neoplasm cannot be completely excluded. Very short-term CT follow-up is suggested. 2. Trace bilateral pleural effusions. 3. Previously identified large pulmonary AVM at the left lung base appears similar to the prior study. 4. Periportal/ton hepatis edema, of uncertain clinical significance. Appearance can be associated with etiologies such as hepatitis and right-sided heart failure, but correlation with any symptoms of gastroduodenitis is suggested, given the presence of stranding adjacent to the distal stomach and proximal duodenum.  Radiation dose reduction techniques were utilized, including automated exposure control and exposure modulation based on body size.  This report was finalized on 3/4/2023 5:03 AM by Dr. Melani Ramon M.D.      XR Chest 1 View    Result Date:  3/3/2023  Blunting of the left costophrenic angle may be related to chronic scarring. However, small effusion cannot be excluded.  This report was finalized on 3/3/2023 11:04 PM by Dr. Melani Ramon M.D.      Results for orders placed during the hospital encounter of 03/03/23    Adult Transthoracic Echo Complete W/ Cont if Necessary Per Protocol    Interpretation Summary  •  Left ventricular systolic function is hyperdynamic (EF > 70%). Calculated left ventricular EF = 74.9% Normal left ventricular wall thickness noted. The left ventricular cavity is borderline dilated. All left ventricular wall segments contract normally. Left ventricular diastolic function is consistent with (grade II w/high LAP) pseudonormalization.  •  Left atrial volume is mildly increased.  The right atrial cavity is mildly dilated.  •  The aortic valve is abnormal in structure. There is moderate thickening of the aortic valve. The aortic valve appears trileaflet.  •  Mild tricuspid valve regurgitation is present. Estimated right ventricular systolic pressure from tricuspid regurgitation is moderately elevated (45-55 mmHg). Calculated right ventricular systolic pressure from tricuspid regurgitation is 48.7 mmHg.          Assessment:          Diagnosis Plan   1. Atrial fibrillation, persistent (HCC)  ECG 12 Lead      2. Essential hypertension  ECG 12 Lead      3. Lymphedema  ECG 12 Lead      4. Obstructive sleep apnea syndrome  ECG 12 Lead             Plan:       1.  Persistent atrial fibrillation-cont Xarelto, rate appropriate with no rate control, consistent with sick sinus syndrome.  Just seen by Dr. Lawton in the hospital, no indication for pacemaker  2.  Obstructive sleep apnea, not on CPAP, no recent evaluation, we referred to sleep medicine, not yet been in to be seen  3.  Chronic lymphedema-  4.  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 Sy  5.  Hospital [id number] well after discharge    Thank you very much for allowing us to  participate in the care of this pleasant patient.  Please don't hesitate to call if I can be of assistance in any way.      Current Outpatient Medications:   •  furosemide (Lasix) 40 MG tablet, Take 1 tablet by mouth Daily. (Patient taking differently: Take 2 tablets by mouth Daily. Indications: Edema), Disp: 90 tablet, Rfl: 1  •  ketorolac (ACULAR) 0.5 % ophthalmic solution, INSTILL 1 DROP INTO LEFT EYE 4 TIMES A DAY, Disp: , Rfl: 3  •  multivitamin with minerals (PRESERVISION AREDS PO), Take 1 tablet by mouth 2 (Two) Times a Day., Disp: , Rfl:   •  rivaroxaban (Xarelto) 20 MG tablet, Take 1 tablet by mouth Daily., Disp: 90 tablet, Rfl: 3  •  VITAMIN D PO, Take  by mouth Daily., Disp: , Rfl:          Return in about 1 year (around 3/24/2024).

## 2023-03-24 NOTE — CASE COMMUNICATION
This is to inform you that Tamara Singh has been d/c from [facility]  All goals met and she is doing well.   Thanks for the referral,  Grace Washington RN

## 2023-04-01 DIAGNOSIS — I27.20 PULMONARY HYPERTENSION: ICD-10-CM

## 2023-04-01 DIAGNOSIS — I48.19 ATRIAL FIBRILLATION, PERSISTENT: ICD-10-CM

## 2023-04-03 RX ORDER — RIVAROXABAN 20 MG/1
TABLET, FILM COATED ORAL
Qty: 90 TABLET | Refills: 2 | Status: SHIPPED | OUTPATIENT
Start: 2023-04-03

## 2023-04-07 DIAGNOSIS — I27.20 PULMONARY HYPERTENSION, UNSPECIFIED: ICD-10-CM

## 2023-04-07 NOTE — TELEPHONE ENCOUNTER
Caller: Tamara Singh    Relationship: Self    Best call back number: [phone]    Requested Prescriptions:   Requested Prescriptions     Pending Prescriptions Disp Refills   • furosemide (Lasix) 40 MG tablet 90 tablet 1     Sig: Take 1 tablet by mouth Daily. Indications: Edema        Pharmacy where request should be sent: [address] [phone] [facility] [phone] FX     Last office visit with prescribing clinician: 9/16/2022   Last telemedicine visit with prescribing clinician: 6/23/2023   Next office visit with prescribing clinician: 6/23/2023     Additional details provided by patient:     Does the patient have less than a 3 day supply:  [] Yes  [x] No    Would you like a call back once the refill request has been completed: [] Yes [] No    If the office needs to give you a call back, can they leave a voicemail: [] Yes [] No    Estela Berg Rep   04/07/23 11:17 EDT

## 2023-04-10 RX ORDER — FUROSEMIDE 40 MG/1
40 TABLET ORAL 2 TIMES DAILY
Qty: 180 TABLET | Refills: 0 | Status: SHIPPED | OUTPATIENT
Start: 2023-04-10

## 2023-06-14 ENCOUNTER — TELEPHONE (OUTPATIENT)
Dept: INTERNAL MEDICINE | Age: 83
End: 2023-06-14

## 2023-06-14 NOTE — TELEPHONE ENCOUNTER
Caller: Tamara Singh    Relationship: Self    Best call back number:     [phone]       What medication are you requesting: SOMETHING FOR STRESS/ANXIETY    What are your current symptoms: CAN'T SLEEP AND CRIES A LOT    How long have you been experiencing symptoms: 2 WEEKS      If a prescription is needed, what is your preferred pharmacy and phone number: [address] [place], [place] - [address] [facility] [phone] [facility] [phone]      Additional notes:    PATIENT IS TAKING CARE OF HER SISTER WHO IS UNDER HOSPICE CARE AND IS REALLY BEING STRESSED OUT WITH THIS AND FEELS SHE NEEDS SOMETHING TO HELP HER.

## 2023-06-15 NOTE — TELEPHONE ENCOUNTER
Please call pt.    Recommend that she schedule an appointment (telehealth) is fine to further discuss treatment options for anxiety.

## 2023-09-11 DIAGNOSIS — I27.20 PULMONARY HYPERTENSION, UNSPECIFIED: ICD-10-CM

## 2023-09-12 RX ORDER — FUROSEMIDE 40 MG/1
TABLET ORAL
Qty: 180 TABLET | Refills: 0 | Status: SHIPPED | OUTPATIENT
Start: 2023-09-12

## 2023-09-13 ENCOUNTER — HOSPITAL ENCOUNTER (OUTPATIENT)
Dept: CT IMAGING | Facility: HOSPITAL | Age: 83
Discharge: HOME | End: 2023-09-13
Admitting: NURSE PRACTITIONER
Payer: MEDICARE

## 2023-09-13 DIAGNOSIS — Q27.30 ARTERIOVENOUS MALFORMATION: ICD-10-CM

## 2023-09-13 PROCEDURE — 25510000001 IOPAMIDOL 61 % SOLUTION: Performed by: NURSE PRACTITIONER

## 2023-09-13 PROCEDURE — 82565 ASSAY OF CREATININE: CPT

## 2023-09-13 PROCEDURE — 71260 CT THORAX DX C+: CPT

## 2023-09-13 RX ADMIN — IOPAMIDOL 75 ML: 612 INJECTION, SOLUTION INTRAVENOUS at 11:08

## 2023-09-14 LAB — CREAT BLDA-MCNC: 0.8 MG/DL (ref 0.6–1.3)

## 2023-09-18 ENCOUNTER — OFFICE VISIT (OUTPATIENT)
Dept: SURGERY | Facility: CLINIC | Age: 83
End: 2023-09-18
Payer: MEDICARE

## 2023-09-18 VITALS
HEART RATE: 81 BPM | BODY MASS INDEX: 36.44 KG/M2 | HEIGHT: 62 IN | DIASTOLIC BLOOD PRESSURE: 78 MMHG | WEIGHT: 198 LBS | SYSTOLIC BLOOD PRESSURE: 124 MMHG | OXYGEN SATURATION: 98 %

## 2023-09-18 DIAGNOSIS — Q27.30 ARTERIOVENOUS MALFORMATION: Primary | ICD-10-CM

## 2023-09-18 PROCEDURE — 3074F SYST BP LT 130 MM HG: CPT

## 2023-09-18 PROCEDURE — 3078F DIAST BP <80 MM HG: CPT

## 2023-09-18 PROCEDURE — 99214 OFFICE O/P EST MOD 30 MIN: CPT

## 2023-09-18 NOTE — PROGRESS NOTES
"Chief Complaint  Left lung AV malformation    Subjective        Tamara Singh presents to [facility] THORACIC SURGERY  History of Present Illness    Ms. 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  She was initially evaluated by Dr. Chris Aguillon who discussed routine surveillance versus high risk thoracic surgical resection of this AV malformation.  Serial monitoring with CT imaging was ultimately elected.    She was last seen by YISEL Mejía on 9/14/2022, CT at this time demonstrated continued stability.  She presents today accompanied by her sister. Ms. Singh reports that she has been doing well since her last visit.  She denies any severe upper back pain just below the scapula radiating to the right posterior chest wall which was her initial complaint on her initial visit with our service.  Of note, she does suffer from chronic back pain. The patient denies any episodes of hemoptysis.  She denies any shortness of breath.  Overall she is without complaints    Objective   Vital Signs:  /78 (BP Location: Left arm, Patient Position: Sitting, Cuff Size: Adult)   Pulse 81   Ht 157.5 cm (62.01\")   Wt 89.8 kg (198 lb)   SpO2 98%   BMI 36.21 kg/m²   Estimated body mass index is 36.21 kg/m² as calculated from the following:    Height as of this encounter: 157.5 cm (62.01\").    Weight as of this encounter: 89.8 kg (198 lb).             Physical Exam   Result Review :      Data reviewed : Radiologic studies CT of the chest performed 9/13/2023    No pathologically enlarged thoracic lymph nodes are identified. Heart  size is normal. There is no pericardial effusion. There is incompletely  assessed calcific coronary artery atherosclerosis. There is mild  calcific aortic atherosclerosis. Great vessels are normal in caliber.  There are asymmetrically prominent inferior left " pulmonary veins, which  extend to a serpiginous enhancing tubular structure and  adjacent/contiguous enhancing mass in the anterior inferior, lateral  left lower lobe and dorsal lingula. The masslike component measures  approximately 2.6 x 2.6 cm. This is best compared to prior  contrast-enhanced CT from 8/31/2022, at which time this measured 3.1 x  2.9 cm when remeasured. It is currently slightly smaller.  Pleural spaces are clear.  Limited imaging through the upper abdomen demonstrates calcific  atherosclerosis. There is multilevel degenerative disc disease.  The trachea is clear. There is mild curvilinear atelectasis in both  lungs, left greater than right. There is no focal consolidation. A tiny  nodule in the right lung is not significantly changed dating back to at  least 2020 and likely benign. Previously noted new areas of bilateral  groundglass opacity and nodularity have resolved.           Assessment and Plan   Diagnoses and all orders for this visit:    1. Arteriovenous malformation (Primary)  -     Cancel: CT Chest With Contrast; Future  -     Cancel: POC Creatinine  -     CT Chest With Contrast; Future  -     POC Creatinine; Future      Ms. Singh presents today without complaints.  She reports that she has done well since her last visit.  She denies any shortness of breath or hemoptysis.  She denies any back pain.  I have independently reviewed the CT of the chest performed on 9/13/2023 which demonstrates a slightly decreased size of the left pulmonary vascular malformation compared to CT imaging performed on 8/31/2022.  The previously noted new areas of bilateral groundglass opacity and nodularity have resolved.  Right lung nodule appears stable dating back to at least 2020.  Would like to continue to monitor her left pulmonary AV malformation with a follow-up CT of the chest to be performed in 1 year.  Discussed that she should seek emergency care should she develop any hemoptysis.         I spent 30  minutes caring for Tamara on this date of service. This time includes time spent by me in the following activities:preparing for the visit, reviewing tests, obtaining and/or reviewing a separately obtained history, counseling and educating the patient/family/caregiver, ordering medications, tests, or procedures, documenting information in the medical record, and independently interpreting results and communicating that information with the patient/family/caregiver  Follow Up   Return in about 1 year (around 9/18/2024), or if symptoms worsen or fail to improve.  Patient was given instructions and counseling regarding her condition or for health maintenance advice. Please see specific information pulled into the AVS if appropriate.

## 2023-09-27 ENCOUNTER — OFFICE VISIT (OUTPATIENT)
Dept: INTERNAL MEDICINE | Age: 83
End: 2023-09-27
Payer: MEDICARE

## 2023-09-27 VITALS
BODY MASS INDEX: 37.17 KG/M2 | HEART RATE: 92 BPM | HEIGHT: 62 IN | DIASTOLIC BLOOD PRESSURE: 76 MMHG | OXYGEN SATURATION: 93 % | TEMPERATURE: 97.6 F | WEIGHT: 202 LBS | SYSTOLIC BLOOD PRESSURE: 134 MMHG

## 2023-09-27 DIAGNOSIS — I10 PRIMARY HYPERTENSION: Primary | ICD-10-CM

## 2023-09-27 DIAGNOSIS — I89.0 LYMPHEDEMA: ICD-10-CM

## 2023-09-27 PROCEDURE — 3078F DIAST BP <80 MM HG: CPT

## 2023-09-27 PROCEDURE — 3075F SYST BP GE 130 - 139MM HG: CPT

## 2023-09-27 PROCEDURE — 99214 OFFICE O/P EST MOD 30 MIN: CPT

## 2023-09-27 NOTE — PROGRESS NOTES
"    I N T E R N A L  M E D I C I N E  Yane Lama, APRN    ENCOUNTER DATE:  09/27/2023    Tamara Singh / 83 y.o. / female      CHIEF COMPLAINT / REASON FOR OFFICE VISIT     Hypertension      ASSESSMENT & PLAN     Diagnoses and all orders for this visit:    1. Primary hypertension (Primary)  -     Comprehensive Metabolic Panel  -     CBC & Differential    2. Lymphedema         SUMMARY/DISCUSSION  Ensure follow up with cardiology office.        Next Appointment with me: 12/18/2023    Return for AWV prior to end of 2023.      VITAL SIGNS     Visit Vitals  /76   Pulse 92   Temp 97.6 °F (36.4 °C)   Ht 157.5 cm (62.01\")   Wt 91.6 kg (202 lb)   LMP  (LMP Unknown)   SpO2 93%   BMI 36.94 kg/m²             Wt Readings from Last 3 Encounters:   09/27/23 91.6 kg (202 lb)   09/18/23 89.8 kg (198 lb)   03/24/23 87 kg (191 lb 12.8 oz)     Body mass index is 36.94 kg/m².        MEDICATIONS AT THE TIME OF OFFICE VISIT     Current Outpatient Medications on File Prior to Visit   Medication Sig Dispense Refill    ketorolac (ACULAR) 0.5 % ophthalmic solution INSTILL 1 DROP INTO LEFT EYE 4 TIMES A DAY  3    Lasix 40 MG tablet TAKE 1 TABLET BY MOUTH TWICE A DAY *BRAND* *PT OK PRICE THRU SINGLECARE* 180 tablet 0    multivitamin with minerals tablet tablet Take 1 tablet by mouth 2 (Two) Times a Day.      VITAMIN D PO Take  by mouth Daily.      Xarelto 20 MG tablet TAKE 1 TABLET BY MOUTH EVERY DAY 90 tablet 2     No current facility-administered medications on file prior to visit.        HISTORY OF PRESENT ILLNESS     Accompanied by her sister to today's visit.  She is doing well.  No concerns or problems.    HTN/ chronic lymphedema: Today's BP is well controlled, 134/76, on furosemide 40 mg BID.  Not currently taking any potassium supplementation.  Not monitoring BP at home.  She reports lower extremity swelling is stable.      Afib: Remains on Xarelto 20 mg without signs/ symptoms of bleeding.  Followed by cardiology, Dr. Miller, " twice a year.      Patient Care Team:  Yane Lama APRN as PCP - General (Family Medicine)  Kerrie, Chris FERRER III, MD as Surgeon (Thoracic Surgery)  Evens Miller III, MD as Consulting Physician (Cardiology)    REVIEW OF SYSTEMS     Review of Systems   Constitutional:  Negative for chills, fever and unexpected weight change.   Respiratory:  Negative for cough, chest tightness and shortness of breath.    Cardiovascular:  Positive for leg swelling. Negative for chest pain and palpitations.   Neurological:  Negative for dizziness, weakness, light-headedness and headaches.   Psychiatric/Behavioral:  The patient is not nervous/anxious.         PHYSICAL EXAMINATION     Physical Exam  Vitals reviewed.   Constitutional:       General: She is not in acute distress.     Appearance: Normal appearance. She is not ill-appearing, toxic-appearing or diaphoretic.   HENT:      Head: Normocephalic and atraumatic.   Cardiovascular:      Rate and Rhythm: Normal rate and regular rhythm.      Heart sounds: Normal heart sounds.   Pulmonary:      Effort: Pulmonary effort is normal.      Breath sounds: Normal breath sounds.   Musculoskeletal:      Right lower leg: Edema present.      Left lower leg: Edema present.   Skin:     General: Skin is warm and dry.      Findings: No erythema.   Neurological:      Mental Status: She is alert and oriented to person, place, and time. Mental status is at baseline.   Psychiatric:         Mood and Affect: Mood normal.         Behavior: Behavior normal.         Thought Content: Thought content normal.         Judgment: Judgment normal.         REVIEWED DATA     Labs:           Imaging:            Medical Tests:           Summary of old records / correspondence / consultant report:           Request outside records:

## 2023-09-28 LAB
ALBUMIN SERPL-MCNC: 4.4 G/DL (ref 3.7–4.7)
ALBUMIN/GLOB SERPL: 1.4 {RATIO} (ref 1.2–2.2)
ALP SERPL-CCNC: 84 IU/L (ref 44–121)
ALT SERPL-CCNC: 12 IU/L (ref 0–32)
AST SERPL-CCNC: 26 IU/L (ref 0–40)
BASOPHILS # BLD AUTO: 0 X10E3/UL (ref 0–0.2)
BASOPHILS NFR BLD AUTO: 1 %
BILIRUB SERPL-MCNC: 0.6 MG/DL (ref 0–1.2)
BUN SERPL-MCNC: 22 MG/DL (ref 8–27)
BUN/CREAT SERPL: 27 (ref 12–28)
CALCIUM SERPL-MCNC: 9.7 MG/DL (ref 8.7–10.3)
CHLORIDE SERPL-SCNC: 100 MMOL/L (ref 96–106)
CO2 SERPL-SCNC: 26 MMOL/L (ref 20–29)
CREAT SERPL-MCNC: 0.83 MG/DL (ref 0.57–1)
EGFRCR SERPLBLD CKD-EPI 2021: 70 ML/MIN/1.73
EOSINOPHIL # BLD AUTO: 0.1 X10E3/UL (ref 0–0.4)
EOSINOPHIL NFR BLD AUTO: 2 %
ERYTHROCYTE [DISTWIDTH] IN BLOOD BY AUTOMATED COUNT: 16.7 % (ref 11.7–15.4)
GLOBULIN SER CALC-MCNC: 3.1 G/DL (ref 1.5–4.5)
GLUCOSE SERPL-MCNC: 75 MG/DL (ref 70–99)
HCT VFR BLD AUTO: 40.2 % (ref 34–46.6)
HGB BLD-MCNC: 12.6 G/DL (ref 11.1–15.9)
IMM GRANULOCYTES # BLD AUTO: 0 X10E3/UL (ref 0–0.1)
IMM GRANULOCYTES NFR BLD AUTO: 0 %
LYMPHOCYTES # BLD AUTO: 2.4 X10E3/UL (ref 0.7–3.1)
LYMPHOCYTES NFR BLD AUTO: 39 %
MCH RBC QN AUTO: 26.4 PG (ref 26.6–33)
MCHC RBC AUTO-ENTMCNC: 31.3 G/DL (ref 31.5–35.7)
MCV RBC AUTO: 84 FL (ref 79–97)
MONOCYTES # BLD AUTO: 0.4 X10E3/UL (ref 0.1–0.9)
MONOCYTES NFR BLD AUTO: 7 %
NEUTROPHILS # BLD AUTO: 3.3 X10E3/UL (ref 1.4–7)
NEUTROPHILS NFR BLD AUTO: 51 %
PLATELET # BLD AUTO: 392 X10E3/UL (ref 150–450)
POTASSIUM SERPL-SCNC: 4.6 MMOL/L (ref 3.5–5.2)
PROT SERPL-MCNC: 7.5 G/DL (ref 6–8.5)
RBC # BLD AUTO: 4.77 X10E6/UL (ref 3.77–5.28)
SODIUM SERPL-SCNC: 140 MMOL/L (ref 134–144)
WBC # BLD AUTO: 6.3 X10E3/UL (ref 3.4–10.8)

## 2023-11-01 ENCOUNTER — TELEPHONE (OUTPATIENT)
Dept: INTERNAL MEDICINE | Age: 83
End: 2023-11-01
Payer: MEDICARE

## 2023-11-02 DIAGNOSIS — L60.0 INGROWN TOENAIL OF BOTH FEET: ICD-10-CM

## 2023-11-02 DIAGNOSIS — B35.1 ONYCHOMYCOSIS: Primary | ICD-10-CM

## 2023-11-15 ENCOUNTER — TELEPHONE (OUTPATIENT)
Dept: CARDIOLOGY | Facility: CLINIC | Age: 83
End: 2023-11-15
Payer: MEDICARE

## 2023-11-15 NOTE — TELEPHONE ENCOUNTER
Caller: Tamara Singh    Relationship: Self    Best call back number: CELL [phone]    What is the best time to reach you: ANY    Who are you requesting to speak with (clinical staff, provider,  specific staff member): ANY      What was the call regarding: PATIENT HAVING INGROWN TOE NAILS CUT IS IT OK TO HAVE LOCAL ANESTHETIC    Is it okay if the provider responds through MyChart: YES

## 2023-12-14 DIAGNOSIS — I27.20 PULMONARY HYPERTENSION, UNSPECIFIED: ICD-10-CM

## 2023-12-14 RX ORDER — FUROSEMIDE 40 MG/1
TABLET ORAL
Qty: 180 TABLET | Refills: 1 | Status: SHIPPED | OUTPATIENT
Start: 2023-12-14

## 2024-01-01 DIAGNOSIS — I48.19 ATRIAL FIBRILLATION, PERSISTENT: ICD-10-CM

## 2024-01-01 DIAGNOSIS — I27.20 PULMONARY HYPERTENSION: ICD-10-CM

## 2024-01-02 RX ORDER — RIVAROXABAN 20 MG/1
TABLET, FILM COATED ORAL
Qty: 90 TABLET | Refills: 2 | Status: SHIPPED | OUTPATIENT
Start: 2024-01-02

## 2024-01-04 ENCOUNTER — TELEPHONE (OUTPATIENT)
Dept: INTERNAL MEDICINE | Age: 84
End: 2024-01-04
Payer: MEDICARE

## 2024-01-04 NOTE — TELEPHONE ENCOUNTER
Please call patient.    May consider Mucinex DM over the counter to help with cough, congestion symptoms.  Recommend that she be seen within our office ASAP to discuss possible Paxlovid antiviral RX given that she is high risk.  If no availability within our office, recommend she visit urgent care later today or tomorrow.  Visit ER for any acutely worsening symptoms, fever, chills, chest pain, shortness of breath.

## 2024-01-04 NOTE — TELEPHONE ENCOUNTER
Caller: Tamara Singh    Relationship to patient: Self    Best call back number: [phone]     Date of positive COVID19 test: 1/4/24 AT HOME TEST     COVID19 symptoms: COUGH, FEVER, CHILLS, SORE THROAT, CONGESTION     Additional information or concerns: WANTING TO KNOW WHAT MEDICATION SHE CAN TAKE TO HELP WITH SYMPTOMS ESPECIALLY THE CONGESTION     What is the patients preferred pharmacy: [id number] - [place], [place] - [address] [facility] - [phone] [facility] [phone]  [phone]            Congratulations again on the birth of your baby!     The first six weeks following your delivery are known as the postpartum period.  Here are some general instructions to help both you and your baby have a healthy and happy postpartum recovery.      Rest & Sleep:  · Focus on yourself and baby during this time, and get plenty of rest for the first few weeks.    · Sleep when your baby sleeps and allow support people help you rest (care for other children, prepare meals, shopping, cleaning, etc).  · Do not lift anything heavier than your baby.  · Take short walks if possible throughout the day.    Nutrition:  · Eat nutritious and well balanced meals to provide your body the energy it needs.  · Drink at least 8 glasses of water every day (try drinking a glass of water every time you feed the baby).  · Do not diet at this time.  · Breastfeeding mothers require extra fluid, calories, protein and calcium.   · Avoid tobacco, alcohol and non-essential medications when breastfeeding.     Postpartum Bleeding ([place]):  · Expect bleeding for up to 6 weeks.  · Expect normal period odor from your bleeding.  · Change your sanitary pad often, and wash your hands before changing.  · DO NOT have intercourse, use tampons, or place anything in your vagina for 6-8 weeks to allow your body time to heal.   Call your doctor/midwife for foul smelling vaginal discharge, large  clots, or heavy bleeding (saturating a pad in an hour).     Care of the Perineum after Vaginal Birth  Expect some swelling and tenderness for several days, especially if a tear occurred.    · Use squirt bottle after urination and bowel movements  · After urinating, pat dry but do not wipe.  · Ice packs, Tucks pads, and/or numbing spray (Dermoplast) can be used for discomfort.    Breast Care for Formula Feeding Mothers:  · Wear a snug and supportive sports bra.  · Apply cold compresses (ice pack or bag of frozen peas) for 20-30 minutes every 3-4 hours.  · Do not  express milk.  · Avoid nipple or breast stimulation even in the shower (Avoid running water directly on breasts).  · Mastitis is a breast infection that may include the following symptoms: fever; red, very sore area of breast; flu-like symptoms.  Call your doctor/midwife right away if these symptoms occur.          Postpartum Family Planning:  Continue discussions with your provider at your follow up visit.     Postpartum Adjustment:   Becoming a mother involves many changes to your mind and body. As a new mother you may be unsure of yourself in your new role, and you may find that you are easily upset, impatient, irritable or tearful. This is normal and comes and goes quickly.  \"Baby blues\" may occur anywhere from a few days after delivery to several weeks postpartum and will pass in a short time.     Postpartum depression goes beyond \"baby blues\" and is persistent, intense, and severe, which may require treatment.          Call your doctor or midwife immediately if you have any of the following          symptoms that could be signs of postpartum depression:  · thoughts of harming yourself or the baby  · lack of interest in the baby, family, or friends  · feeling guilty or worthless  · feeling hopeless  · uncontrollable crying  · feelings of being a bad mother  · trouble sleeping or oversleeping  · changes in appetite  · strong feelings of irritability, anger, or nervousness    Here are some additional resources offering support for Postpartum Depression:    · National [place] for Mental Health:7-506-442-4217  · Mental [place] of Mental Health: 1-762.545.1448  · Mental Health [facility]:1-655.298.3907 Contact your healthcare provider  · Postpartum Support International  i-472.474.1744      Postpartum Preeclampsia: A serious condition that occurs when a woman has high blood pressure and excess protein in her urine soon after childbirth. It usually occurs within a few days of birth, but can develop up to 6 weeks  after having the baby. Preeclampsia can result in seizures and other serious complications and requires prompt treatment.         Call your doctor or midwife immediately if you experience any of the  Following symptoms that could be signs of Preeclampsia:  · severe headache that doesn't go away  · abdominal pain  · shortness of breath  · nausea and vomiting   · confusion  · vision changes:blurred vision or flashing spots   · gain more than 3 pounds in three days    Safe Use of Your Baby Box  Your Baby Box is meant to be used as a Safe Sleep alternative. Please read and follow all warnings printed on your Baby Box. All Safe Sleep rules apply. Never carry baby in the box. Your Baby Box should be placed on a sturdy, hard, flat surface (ideally, the floor). You can safely use your Baby Box until baby begins to roll from back to front, or weighs approximately 15 pounds unless otherwise directed by your pediatrician. Baby Box is a temporary Safe Sleep solution. A crib or other permanent sleep space should still be arranged.     Summary of when to call your doctor or midwife:  · Foul smelling vaginal discharge  · Passing large blood clots or heavy bleeding (saturating a pad in an hour)  · Fever above 100.4F  · Redness, swelling, increased pain or drainage from incision (if you had a )  · Redness & pain in any area of breasts  · Flu-like symptoms  · Postpartum depression: feeling hopeless, uncontrollable crying, trouble sleeping, strong feelings of anger, anxiety, or irritability; lack of interest in baby or thoughts of harming baby  · Preeclampsia symptoms: headache,vision changes, difficulty breathing, confusion, abdominal pain, increase in weight with puffy face, hands or feet    Immunizations:  Most Recent Immunizations   Administered Date(s) Administered   • None   Pended Date(s) Pended   • Tdap 2019   Deferred Date(s) Deferred   • MMR 2019   • Varicella 2019     [facility]  Staff at [facility] have enjoyed caring for you during this special time, and we wish you a safe and healthy recovery.     If you have additional questions about these discharge instructions, please call the [facility] Nurse's station at [phone].

## 2024-01-05 NOTE — TELEPHONE ENCOUNTER
Pt was called and given pj orders. She will go to Er if she gets worse pt has no ride to our office and can't work My chart without her family.

## 2024-02-29 NOTE — TELEPHONE ENCOUNTER
Done   [Other: _____] : [unfilled] [de-identified] : The patient is a 89 year old F who presents to the office for the following concerns:  Refills: says she needs all her medications renewed  She endorses chronic pains. She says she cannot use tylenol #Constipation - patient notes senna is not improving symptoms  #Dyspnea - intermittent. chronic in nature. No edema. No orthopnea or PND.

## 2024-03-25 ENCOUNTER — OFFICE VISIT (OUTPATIENT)
Dept: CARDIOLOGY | Facility: CLINIC | Age: 84
End: 2024-03-25
Payer: MEDICARE

## 2024-03-25 VITALS
HEIGHT: 62 IN | OXYGEN SATURATION: 100 % | WEIGHT: 209 LBS | DIASTOLIC BLOOD PRESSURE: 98 MMHG | BODY MASS INDEX: 38.46 KG/M2 | SYSTOLIC BLOOD PRESSURE: 170 MMHG | HEART RATE: 71 BPM

## 2024-03-25 DIAGNOSIS — I48.21 PERMANENT ATRIAL FIBRILLATION: Primary | ICD-10-CM

## 2024-03-25 DIAGNOSIS — R00.1 SYMPTOMATIC BRADYCARDIA: ICD-10-CM

## 2024-03-25 DIAGNOSIS — G47.33 OBSTRUCTIVE SLEEP APNEA SYNDROME: ICD-10-CM

## 2024-03-25 DIAGNOSIS — I10 PRIMARY HYPERTENSION: ICD-10-CM

## 2024-03-25 DIAGNOSIS — E66.9 OBESITY (BMI 30-39.9): ICD-10-CM

## 2024-03-25 DIAGNOSIS — I87.2 VENOUS STASIS DERMATITIS OF BOTH LOWER EXTREMITIES: ICD-10-CM

## 2024-03-25 DIAGNOSIS — I27.20 PULMONARY HYPERTENSION: ICD-10-CM

## 2024-03-25 DIAGNOSIS — I89.0 LYMPHEDEMA: ICD-10-CM

## 2024-03-25 DIAGNOSIS — R00.0 RACING HEART BEAT: ICD-10-CM

## 2024-03-25 PROCEDURE — 3077F SYST BP >= 140 MM HG: CPT | Performed by: NURSE PRACTITIONER

## 2024-03-25 PROCEDURE — 93000 ELECTROCARDIOGRAM COMPLETE: CPT | Performed by: NURSE PRACTITIONER

## 2024-03-25 PROCEDURE — 1159F MED LIST DOCD IN RCRD: CPT | Performed by: NURSE PRACTITIONER

## 2024-03-25 PROCEDURE — 99214 OFFICE O/P EST MOD 30 MIN: CPT | Performed by: NURSE PRACTITIONER

## 2024-03-25 PROCEDURE — 1160F RVW MEDS BY RX/DR IN RCRD: CPT | Performed by: NURSE PRACTITIONER

## 2024-03-25 PROCEDURE — 3079F DIAST BP 80-89 MM HG: CPT | Performed by: NURSE PRACTITIONER

## 2024-03-25 RX ORDER — VALSARTAN 80 MG/1
80 TABLET ORAL DAILY
Qty: 90 TABLET | Refills: 0 | Status: SHIPPED | OUTPATIENT
Start: 2024-03-25

## 2024-03-25 RX ORDER — ATENOLOL 25 MG/1
12.5 TABLET ORAL DAILY
Qty: 45 TABLET | Refills: 0 | Status: SHIPPED | OUTPATIENT
Start: 2024-03-25

## 2024-03-25 NOTE — PROGRESS NOTES
Date of Office Visit: 2024  Encounter Provider: YISEL Burger  Place of Service: [facility] CARDIOLOGY  Patient Name: Tamara Singh  :1940  Primary Cardiologist: Dr. Evens Miller    Chief Complaint   Patient presents with    Atrial Fibrillation    Annual Exam   :     HPI: Tamara Singh is a 84 y.o. female who presents today for cardiac follow-up visit.  I reviewed her medical records.    She has been diagnosed with hypertension, venous stasis, lymphedema, obstructive sleep apnea (does not use CPAP), obesity, and macular degeneration.  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    In 2021, she was noted to be in asymptomatic atrial fibrillation and was rate controlled naturally.  She was placed on rivaroxaban.  Echocardiogram was stable.  24-hour Holter monitor showed atrial fibrillation with average heart rate of 59 bpm.    In 2023, echocardiogram showed EF 75%, grade 2 diastolic dysfunction, left ventricle borderline dilated, left atrial volume increased, right atrium mildly dilated, moderate thickening of the aortic valve, and mild tricuspid regurgitation.    She presents today with her niece accompanying her.  She was living with both her brother and sister and unfortunately both have  in the past year.  Her other sister from [place] has been staying with her recently.  She reports heart beating faster.  Her blood pressure has been elevated running 170s/70s.  She has chronic lower extremity edema and her legs burning.  She denies chest pain, shortness of air, PND, orthopnea, dizziness, syncope, or bleeding.      Past Medical History:   Diagnosis Date    Abnormal ECG 2021    Anxiety     Arthritis     Asthma     Used to use inhaler but dont anymore.    Atrial fibrillation     Cancer     Superficial on face    Cholelithiasis     Coronary artery disease     Hypertension     Legally blind     Low back pain     Macular  degeneration     Obesity     Pulmonary hypertension     Pulmonary hypertension     Sleep apnea     Urinary tract infection     Visual impairment     macular degeneration       Past Surgical History:   Procedure Laterality Date    CHOLECYSTECTOMY  1974       Social History     Socioeconomic History    Marital status: Single   Tobacco Use    Smoking status: Never    Smokeless tobacco: Never   Vaping Use    Vaping status: Never Used   Substance and Sexual Activity    Alcohol use: Never    Drug use: No     Types: Hydrocodone     Comment: Pt is prescribe hydrocodone    Sexual activity: Never       Family History   Problem Relation Age of Onset    Heart attack Mother 86    Stroke Mother     Heart disease Mother 80    Hypertension Mother     Heart failure Mother     Heart attack Brother 70    Asthma Brother     Hyperlipidemia Brother     Thyroid disease Brother     Arthritis Brother     Heart failure Father     Kidney disease Sister         Stage 5 on dialysis       The following portion of the patient's history were reviewed and updated as appropriate: past medical history, past surgical history, past social history, past family history, allergies, current medications, and problem list.    Review of Systems   Constitutional: Negative.   Cardiovascular:  Positive for leg swelling and palpitations.   Respiratory: Negative.     Hematologic/Lymphatic: Negative.    Neurological: Negative.        Allergies   Allergen Reactions    Penicillins Hives     Several years ago - did seek aid at an emergency department but no SOB/anaphylaxis symptoms noted     Moxifloxacin Other (See Comments)     PUPILS IN HER EYES WERE VERY LARGE     Paco-Bacit-Poly-Lidocaine Rash         Current Outpatient Medications:     ketorolac (ACULAR) 0.5 % ophthalmic solution, INSTILL 1 DROP INTO LEFT EYE 4 TIMES A DAY, Disp: , Rfl: 3    Lasix 40 MG tablet, TAKE 1 TABLET BY MOUTH TWICE A DAY *BRAND* *PT OK PRICE THRU SINGLECARE*, Disp: 180 tablet, Rfl: 1     "multivitamin with minerals tablet tablet, Take 1 tablet by mouth 2 (Two) Times a Day., Disp: , Rfl:     VITAMIN D PO, Take  by mouth Daily., Disp: , Rfl:     Xarelto 20 MG tablet, TAKE 1 TABLET BY MOUTH EVERY DAY, Disp: 90 tablet, Rfl: 2          Objective:     Vitals:    03/25/24 1337 03/25/24 1411   BP: (!) 182/80 170/98   BP Location: Left arm Left arm   Patient Position: Sitting Sitting   Cuff Size: Adult    Pulse: 71    SpO2: 100%    Weight: 94.8 kg (209 lb)    Height: 157.5 cm (62.01\")      Body mass index is 38.22 kg/m².    PHYSICAL EXAM:    Vitals Reviewed.   General Appearance: No acute distress, well developed and well nourished. Obese.   HENT: No hearing loss noted.    Respiratory: No signs of respiratory distress. Respiration rhythm and depth normal.  Clear to auscultation.   Cardiovascular:  Jugular Venous Pressure: Normal  Heart Rate and Rhythm: Irregular, irregular.  Heart Sounds: Normal S1 and S2. No S3 or S4 noted.  Murmurs: No murmurs noted. No rubs, thrills, or gallops.   Lower Extremities: Bilateral lower extremity edema present.  Musculoskeletal: Normal movement of extremities.  Skin: General appearance normal.    Psychiatric: Patient alert and oriented to person, place, and time. Speech and behavior appropriate. Normal mood and affect.       ECG 12 Lead    Date/Time: 3/25/2024 1:38 PM  Performed by: Lori Rojas APRN    Authorized by: Lori Rojas APRN  Comparison: compared with previous ECG from 3/24/2023  Similar to previous ECG  Rhythm: atrial fibrillation  Rate: normal  BPM: 71  Conduction: conduction normal  ST Segments: ST segments normal  T Waves: T waves normal  QRS axis: normal    Clinical impression: abnormal EKG            Assessment:       Diagnosis Plan   1. Permanent atrial fibrillation        2. Racing heart beat        3. Symptomatic bradycardia        4. Primary hypertension        5. Lymphedema  Ambulatory Referral to Lymphedema Clinic      6. Venous stasis " dermatitis of both lower extremities        7. Pulmonary hypertension        8. Obstructive sleep apnea syndrome        9. Obesity (BMI 30-39.9)               Plan:       1/2.  Permanent Atrial Fibrillation: She has been naturally rate controlled for years.  At one point, she had symptomatic bradycardia in the setting of hypothermia.  She reports heart racing throughout the day.  I recommended low-dose atenolol 12.5 mg at nighttime.  We will keep a close eye on her heart rate.[id number] score of 5 and remains on rivaroxaban.  Denies bleeding.    3.  History of symptomatic bradycardia in the setting of hypothermia.    4.  Hypertension: Blood pressure quite elevated today.  Add valsartan 80 mg daily.    5/6.  Lymphedema and venous stasis noted.  Bilateral lower extremity edema present.  I recommended leg elevation, low-sodium diet, and weight loss would be beneficial.  I am not sure that she can get compression stockings on.  I will refer her to the [facility] lymphedema clinic.    7.  History of pulmonary hypertension.    8.  Obstructive sleep apnea: Noncompliant with CPAP.    9.  Obesity: Body mass index is 38.22 kg/m².     10.  Her niece comes in from [place] to bring her to office appointments.  I was going to have her follow-up with me in a couple of weeks, but her nieces could be in town next week.  She will see another nurse practitioner because I am out of the office.  Will reassess her heart rate, blood pressure, and she will need a BMP to be ordered.    As always, it has been a pleasure to participate in your patient's care. Thank you.         Sincerely,         YISEL Vergara  [facility] Cardiology      Dictated utilizing Dragon Dictation  I spent 38 minutes reviewing her medical records/testing/previous office notes/labs, face-to-face interaction with patient, physical examination, formulating the plan of care, and discussion of plan of care with patient.

## 2024-03-26 ENCOUNTER — TELEPHONE (OUTPATIENT)
Dept: CARDIOLOGY | Facility: CLINIC | Age: 84
End: 2024-03-26

## 2024-03-26 NOTE — TELEPHONE ENCOUNTER
Caller: REID    Relationship: SELF    Best call back number: [phone]    What is the best time to reach you: ANY        What was the call regarding: PLEASE MAIL VISIT SUMMARY FROM 3/25/24 TO PATIENT- SHE IS UNABLE TO GET INTO HER MYCHART

## 2024-03-27 ENCOUNTER — TELEPHONE (OUTPATIENT)
Dept: CARDIOLOGY | Facility: CLINIC | Age: 84
End: 2024-03-27
Payer: MEDICARE

## 2024-03-27 DIAGNOSIS — I48.19 ATRIAL FIBRILLATION, PERSISTENT: Primary | ICD-10-CM

## 2024-03-27 NOTE — TELEPHONE ENCOUNTER
Caller: Tamara Singh    Relationship: Self    Best call back number: [phone]    What orders are you requesting (i.e. lab or imaging): LABS    In what timeframe would the patient need to come in: ASAP    Where will you receive your lab/imaging services: [facility]    Additional notes: PT IS CALLING TO SEE IF SHE CAN GET LAB WORK COMPLETED ON MONDAY OR TUESDAY BEFORE APPT ON 4/3/24. SHE ALSO WANTED TO SEE IF SHE NEEDED TO SCHEDULE AN APPT

## 2024-03-28 NOTE — TELEPHONE ENCOUNTER
I spoke to patient who gave a verbal understanding to have labs completed before her upcoming appointment on Monday.

## 2024-03-28 NOTE — TELEPHONE ENCOUNTER
Please call patient and let her know that I ordered the blood work and she can have it done on Monday before the appointment.  She states to go to the main lab.  Thanks

## 2024-04-01 ENCOUNTER — LAB (OUTPATIENT)
Dept: LAB | Facility: HOSPITAL | Age: 84
End: 2024-04-01
Payer: MEDICARE

## 2024-04-01 DIAGNOSIS — I48.19 ATRIAL FIBRILLATION, PERSISTENT: ICD-10-CM

## 2024-04-01 LAB
ANION GAP SERPL CALCULATED.3IONS-SCNC: 14 MMOL/L (ref 5–15)
BUN SERPL-MCNC: 22 MG/DL (ref 8–23)
BUN/CREAT SERPL: 28.2 (ref 7–25)
CALCIUM SPEC-SCNC: 9.7 MG/DL (ref 8.6–10.5)
CHLORIDE SERPL-SCNC: 100 MMOL/L (ref 98–107)
CO2 SERPL-SCNC: 27 MMOL/L (ref 22–29)
CREAT SERPL-MCNC: 0.78 MG/DL (ref 0.57–1)
EGFRCR SERPLBLD CKD-EPI 2021: 75.5 ML/MIN/1.73
GLUCOSE SERPL-MCNC: 94 MG/DL (ref 65–99)
POTASSIUM SERPL-SCNC: 3.9 MMOL/L (ref 3.5–5.2)
SODIUM SERPL-SCNC: 141 MMOL/L (ref 136–145)

## 2024-04-01 PROCEDURE — 36415 COLL VENOUS BLD VENIPUNCTURE: CPT

## 2024-04-01 PROCEDURE — 80048 BASIC METABOLIC PNL TOTAL CA: CPT

## 2024-04-01 NOTE — TELEPHONE ENCOUNTER
4/1/24    I have printed the AVS for the patient and will be mailing it out today.    Thanks  Jeri

## 2024-04-07 ENCOUNTER — TELEPHONE (OUTPATIENT)
Dept: CARDIOLOGY | Facility: CLINIC | Age: 84
End: 2024-04-07
Payer: MEDICARE

## 2024-05-20 ENCOUNTER — TELEPHONE (OUTPATIENT)
Dept: CARDIOLOGY | Facility: CLINIC | Age: 84
End: 2024-05-20
Payer: MEDICARE

## 2024-05-20 NOTE — TELEPHONE ENCOUNTER
Caller: Tamara Singh    Relationship to patient: Self    Best call back number: [phone] (home)      Chief complaint: NEEDING A BP CHECK    Type of visit: FU     Requested date: ASAP     Additional notes:PT STATES SHE IS NEEDING A BP CHECK. PLEASE ADVISE WHEN TO SCHEDULE APPT AND CALL PT [facility] TO GET SCHEDULED.

## 2024-05-21 ENCOUNTER — TELEPHONE (OUTPATIENT)
Dept: CARDIOLOGY | Facility: CLINIC | Age: 84
End: 2024-05-21

## 2024-05-21 NOTE — TELEPHONE ENCOUNTER
Hub staff attempted to follow warm transfer process and was unsuccessful     Caller: Tamara Singh    Relationship to patient: Self    Best call back number: [phone]    Patient is needing: PATIENT STATED THAT SHE WAS CALLING BACK BECAUSE SHE HAD LEFT MESSAGE ABOUT NEEDING TO SCHEDULING A BLOOD PRESSURE CHECK WITH ARRON MURDOCK. PATIENT HAS NOT HEARD ANYTHING. PLEASE CONTACT PATIENT. THANK YOU.

## 2024-06-05 ENCOUNTER — OFFICE VISIT (OUTPATIENT)
Dept: CARDIOLOGY | Facility: CLINIC | Age: 84
End: 2024-06-05
Payer: MEDICARE

## 2024-06-05 VITALS
OXYGEN SATURATION: 100 % | BODY MASS INDEX: 39.08 KG/M2 | SYSTOLIC BLOOD PRESSURE: 150 MMHG | DIASTOLIC BLOOD PRESSURE: 82 MMHG | WEIGHT: 212.4 LBS | HEIGHT: 62 IN | HEART RATE: 56 BPM

## 2024-06-05 DIAGNOSIS — Z01.810 ENCOUNTER FOR PRE-OPERATIVE CARDIOVASCULAR CLEARANCE: ICD-10-CM

## 2024-06-05 DIAGNOSIS — I10 PRIMARY HYPERTENSION: ICD-10-CM

## 2024-06-05 DIAGNOSIS — I48.21 PERMANENT ATRIAL FIBRILLATION: Primary | ICD-10-CM

## 2024-06-05 DIAGNOSIS — I89.0 LYMPHEDEMA: ICD-10-CM

## 2024-06-05 DIAGNOSIS — E66.9 OBESITY (BMI 30-39.9): ICD-10-CM

## 2024-06-05 DIAGNOSIS — I87.2 VENOUS STASIS DERMATITIS OF BOTH LOWER EXTREMITIES: ICD-10-CM

## 2024-06-05 DIAGNOSIS — I27.20 PULMONARY HYPERTENSION: ICD-10-CM

## 2024-06-05 DIAGNOSIS — I51.89 GRADE II DIASTOLIC DYSFUNCTION: ICD-10-CM

## 2024-06-05 DIAGNOSIS — R00.0 RACING HEART BEAT: ICD-10-CM

## 2024-06-05 DIAGNOSIS — G47.33 OBSTRUCTIVE SLEEP APNEA SYNDROME: ICD-10-CM

## 2024-06-05 DIAGNOSIS — R00.1 SYMPTOMATIC BRADYCARDIA: ICD-10-CM

## 2024-06-05 PROCEDURE — 93000 ELECTROCARDIOGRAM COMPLETE: CPT | Performed by: NURSE PRACTITIONER

## 2024-06-05 PROCEDURE — 1159F MED LIST DOCD IN RCRD: CPT | Performed by: NURSE PRACTITIONER

## 2024-06-05 PROCEDURE — 3077F SYST BP >= 140 MM HG: CPT | Performed by: NURSE PRACTITIONER

## 2024-06-05 PROCEDURE — 1160F RVW MEDS BY RX/DR IN RCRD: CPT | Performed by: NURSE PRACTITIONER

## 2024-06-05 PROCEDURE — 99214 OFFICE O/P EST MOD 30 MIN: CPT | Performed by: NURSE PRACTITIONER

## 2024-06-05 PROCEDURE — 3078F DIAST BP <80 MM HG: CPT | Performed by: NURSE PRACTITIONER

## 2024-06-05 RX ORDER — ATENOLOL 25 MG/1
25 TABLET ORAL DAILY
Qty: 90 TABLET | Refills: 1 | Status: SHIPPED | OUTPATIENT
Start: 2024-06-05 | End: 2024-06-05 | Stop reason: SDUPTHER

## 2024-06-05 RX ORDER — VALSARTAN 160 MG/1
160 TABLET ORAL EVERY MORNING
Qty: 90 TABLET | Refills: 0 | Status: SHIPPED | OUTPATIENT
Start: 2024-06-05

## 2024-06-05 RX ORDER — ATENOLOL 25 MG/1
25 TABLET ORAL DAILY
Qty: 90 TABLET | Refills: 1 | Status: SHIPPED | OUTPATIENT
Start: 2024-06-05

## 2024-06-05 NOTE — PROGRESS NOTES
Date of Office Visit: 2024  Encounter Provider: YISEL Burger  Place of Service: [facility] CARDIOLOGY  Patient Name: Tamara Singh  :1940  Primary Cardiologist: Dr. Evens Miller     Chief Complaint   Patient presents with    Hypertension    Slow Heart Rate    Leg Swelling    Follow-up   :     HPI: Tamara Singh is a 84 y.o. female who presents today for cardiac follow-up visit.  I have reviewed her medical records.    She has been diagnosed with hypertension, venous stasis, lymphedema, obstructive sleep apnea (does not use CPAP), obesity, and macular degeneration.  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     In 2021, she was noted to be in asymptomatic atrial fibrillation and was rate controlled naturally.  She was placed on rivaroxaban.  Echocardiogram was stable.  24-hour Holter monitor showed atrial fibrillation with average heart rate of 59 bpm.     In 2023, echocardiogram showed EF 75%, grade 2 diastolic dysfunction, left ventricle borderline dilated, left atrial volume increased, right atrium mildly dilated, moderate thickening of the aortic valve, mild tricuspid regurgitation, and moderate pulmonary hypertension (RVSP 48 mmHg).     In 2024, she followed up with me in the office.  She reported lower extremity edema, tachycardia at nighttime, legs burning, and elevated blood pressure.  Was started on atenolol 12.5 mg at nighttime to help with the tachycardia and valsartan 80 mg daily.  Follow-up BMP was normal.  I referred her to lymphedema clinic and she never went for the visit.    She presents today for a follow-up visit with her sister accompanying her.  Despite taking the atenolol she is still having the nighttime heart racing when she lies down to go to bed.  Blood pressures have been running 140-150s systolic.  Blood pressure elevated on both checks today.  Her lower extremity edema is unchanged.  She is still having  leg burning at nighttime and back pain.  She denies chest pain, shortness of air, PND, orthopnea, dizziness, syncope, or bleeding.  She is scheduled for right cataract surgery in July.      Past Medical History:   Diagnosis Date    Abnormal ECG 06/05/2021    Anxiety     Arthritis     Asthma     Used to use inhaler but dont anymore.    Atrial fibrillation     Cancer     Superficial on face    Cholelithiasis     Coronary artery disease     Hypertension     Legally blind     Low back pain     Macular degeneration     Obesity     Pulmonary hypertension     Sleep apnea     Urinary tract infection     Visual impairment     macular degeneration       Past Surgical History:   Procedure Laterality Date    CHOLECYSTECTOMY  1974       Social History     Socioeconomic History    Marital status: Single   Tobacco Use    Smoking status: Never    Smokeless tobacco: Never   Vaping Use    Vaping status: Never Used   Substance and Sexual Activity    Alcohol use: Never    Drug use: No     Types: Hydrocodone     Comment: Pt is prescribe hydrocodone    Sexual activity: Never       Family History   Problem Relation Age of Onset    Heart attack Mother 86    Stroke Mother     Heart disease Mother 80    Hypertension Mother     Heart failure Mother     Heart attack Brother 70    Asthma Brother     Hyperlipidemia Brother     Thyroid disease Brother     Arthritis Brother     Heart failure Father     Kidney disease Sister         Stage 5 on dialysis       The following portion of the patient's history were reviewed and updated as appropriate: past medical history, past surgical history, past social history, past family history, allergies, current medications, and problem list.    Review of Systems   Constitutional: Negative.   Cardiovascular:  Positive for leg swelling and palpitations.   Respiratory: Negative.     Hematologic/Lymphatic: Negative.    Musculoskeletal:  Positive for back pain.   Neurological: Negative.        Allergies   Allergen  "Reactions    Penicillins Hives     Several years ago - did seek aid at an emergency department but no SOB/anaphylaxis symptoms noted     Moxifloxacin Other (See Comments)     PUPILS IN HER EYES WERE VERY LARGE     Paco-Bacit-Poly-Lidocaine Rash         Current Outpatient Medications:     atenolol (TENORMIN) 25 MG tablet, Take 1/2 tablet by mouth Daily. One hour before bed, Disp: 90 tablet, Rfl: 1    ketorolac (ACULAR) 0.5 % ophthalmic solution, INSTILL 1 DROP INTO LEFT EYE 4 TIMES A DAY, Disp: , Rfl: 3    Lasix 40 MG tablet, TAKE 1 TABLET BY MOUTH TWICE A DAY *BRAND* *PT OK PRICE THRU SINGLECARE*, Disp: 180 tablet, Rfl: 1    multivitamin with minerals tablet tablet, Take 1 tablet by mouth 2 (Two) Times a Day., Disp: , Rfl:     valsartan (DIOVAN) 80 MG tablet, Take 1 tablet by mouth Every Morning., Disp: 90 tablet, Rfl: 0    VITAMIN D PO, Take  by mouth Daily., Disp: , Rfl:     Xarelto 20 MG tablet, TAKE 1 TABLET BY MOUTH EVERY DAY, Disp: 90 tablet, Rfl: 2         Objective:     Vitals:    06/05/24 1119 06/05/24 1200   BP: 162/78 150/82   BP Location: Right arm Left arm   Patient Position: Sitting Sitting   Cuff Size: Large Adult    Pulse: 56    SpO2: 100%    Weight: 96.3 kg (212 lb 6.4 oz)    Height: 157.5 cm (62.01\")      Body mass index is 38.84 kg/m².    PHYSICAL EXAM:    Vitals Reviewed.   General Appearance: No acute distress, well developed and well nourished. Obese.   Eyes: Glasses.   HENT: No hearing loss noted.    Respiratory: No signs of respiratory distress. Respiration rhythm and depth normal.  Clear to auscultation.   Cardiovascular:  Jugular Venous Pressure: Normal  Heart Rate and Rhythm: Irregularly, irregular.  Heart Sounds: Normal S1 and S2. No S3 or S4 noted.  Murmurs: No murmurs noted. No rubs, thrills, or gallops.   Lower Extremities: Bilateral lower extremity edema with venous stasis present.  Purple discoloration.  Musculoskeletal: Normal movement of extremities.  Skin: General appearance " normal.    Psychiatric: Patient alert and oriented to person, place, and time. Speech and behavior appropriate. Normal mood and affect.       ECG 12 Lead    Date/Time: 6/5/2024 11:23 AM  Performed by: Lori Rojas APRN    Authorized by: Lori Rojas APRN  Comparison: compared with previous ECG from 3/25/2024  Similar to previous ECG  Rhythm: atrial fibrillation  Rate: bradycardic  BPM: 56  Conduction: conduction normal  ST Segments: ST segments normal  T Waves: T waves normal  QRS axis: normal    Clinical impression: abnormal EKG            Assessment:       Diagnosis Plan   1. Permanent atrial fibrillation  ECG 12 Lead      2. Racing heart beat        3. Symptomatic bradycardia        4. Primary hypertension        5. Grade II diastolic dysfunction        6. Lymphedema        7. Venous stasis dermatitis of both lower extremities        8. Pulmonary hypertension        9. Obstructive sleep apnea syndrome        10. Obesity (BMI 30-39.9)        11. Encounter for pre-operative cardiovascular clearance               Plan:       1/2/3.  Permanent atrial fibrillation: Heart rate typically runs in the 50s.  She reports heart racing when she lies down at nighttime.  With the addition of atenolol 12.5 mg at bedtime, this did not make a difference.  I recommend increasing atenolol to 25 mg 1 tablet before bedtime.  We will keep a close eye on her bradycardia.  [id number] score of 5 and she remains on rivaroxaban and denies bleeding.     4.  Hypertension: Blood pressure elevated today.  Increase valsartan to 160 mg daily.  Recheck BMP next month.    5.  Grade 2 diastolic dysfunction noted on echocardiogram.  Euvolemic today.    6/7.  Lower extremity edema: Multifactorial.  Her legs really her to take for compression/support hose.  She would benefit from weight loss, leg elevation, and low-sodium diet.  She is willing to call the lymphedema clinic and be seen again.  She will continue on furosemide.    8.  Moderate  pulmonary hypertension noted on last echocardiogram.  This could improve with compliance with her CPAP.  Denies shortness of breath.    9.  Obstructive sleep apnea: Noncompliant with CPAP.    10.  Obesity: Body mass index is 38.84 kg/m².     11.  She is at acceptable risk from a cardiac standpoint to proceed with cataract surgery in July.  At this time, she is not sure if she needs to hold her rivaroxaban.  If the procedure can be done on the rivaroxaban that would be great to decrease risk of potential stroke.  If she needs to hold her rivaroxaban, 1 to 2 days before the procedure according to the ophthalmologist's discretion, she is at nonmodifiable potential risk of stroke.  She verbalizes understanding.    12.  She will follow-up with me in 4 to 6 weeks.  Will repeat a BMP at that time.  She will continue to monitor her blood pressure and heart rates and call with concerns.    As always, it has been a pleasure to participate in your patient's care. Thank you.         Sincerely,         YISEL Vergara  [facility] Cardiology      Dictated utilizing Dragon Dictation  I spent 30 minutes reviewing her medical records/testing/previous office notes/labs, face-to-face interaction with patient, physical examination, formulating the plan of care, and discussion of plan of care with patient.

## 2024-07-12 ENCOUNTER — OFFICE VISIT (OUTPATIENT)
Dept: CARDIOLOGY | Facility: CLINIC | Age: 84
End: 2024-07-12
Payer: MEDICARE

## 2024-07-12 VITALS
WEIGHT: 211 LBS | HEIGHT: 62 IN | HEART RATE: 55 BPM | SYSTOLIC BLOOD PRESSURE: 140 MMHG | DIASTOLIC BLOOD PRESSURE: 72 MMHG | BODY MASS INDEX: 38.83 KG/M2

## 2024-07-12 DIAGNOSIS — E66.9 OBESITY (BMI 30-39.9): ICD-10-CM

## 2024-07-12 DIAGNOSIS — G47.33 OBSTRUCTIVE SLEEP APNEA SYNDROME: ICD-10-CM

## 2024-07-12 DIAGNOSIS — R00.0 RACING HEART BEAT: ICD-10-CM

## 2024-07-12 DIAGNOSIS — Z01.810 ENCOUNTER FOR PRE-OPERATIVE CARDIOVASCULAR CLEARANCE: ICD-10-CM

## 2024-07-12 DIAGNOSIS — I10 PRIMARY HYPERTENSION: ICD-10-CM

## 2024-07-12 DIAGNOSIS — I89.0 LYMPHEDEMA: ICD-10-CM

## 2024-07-12 DIAGNOSIS — I48.21 PERMANENT ATRIAL FIBRILLATION: Primary | ICD-10-CM

## 2024-07-12 DIAGNOSIS — I51.89 GRADE II DIASTOLIC DYSFUNCTION: ICD-10-CM

## 2024-07-12 DIAGNOSIS — I27.20 PULMONARY HYPERTENSION: ICD-10-CM

## 2024-07-12 DIAGNOSIS — I27.20 PULMONARY HYPERTENSION, UNSPECIFIED: ICD-10-CM

## 2024-07-12 RX ORDER — ATENOLOL 25 MG/1
25 TABLET ORAL DAILY
Qty: 180 TABLET | Refills: 0 | Status: SHIPPED | OUTPATIENT
Start: 2024-07-12 | End: 2024-07-12 | Stop reason: SDUPTHER

## 2024-07-12 RX ORDER — ATENOLOL 25 MG/1
25 TABLET ORAL 2 TIMES DAILY
Qty: 180 TABLET | Refills: 0 | Status: SHIPPED | OUTPATIENT
Start: 2024-07-12

## 2024-07-12 RX ORDER — FUROSEMIDE 40 MG/1
40 TABLET ORAL 2 TIMES DAILY
Qty: 180 TABLET | Refills: 3 | Status: SHIPPED | OUTPATIENT
Start: 2024-07-12

## 2024-07-12 RX ORDER — VALSARTAN 160 MG/1
160 TABLET ORAL EVERY MORNING
Qty: 90 TABLET | Refills: 3 | Status: SHIPPED | OUTPATIENT
Start: 2024-07-12

## 2024-07-12 NOTE — PROGRESS NOTES
Date of Office Visit: 2024  Encounter Provider: YISEL Burger  Place of Service: [facility] CARDIOLOGY  Patient Name: Tamara Singh  :1940  Primary Cardiologist: Dr. Evens Miller    Chief Complaint   Patient presents with    Atrial Fibrillation    Follow-up     1 month   :     HPI: Tamara Singh is a 84 y.o. female who presents today for follow-up on atrial fibrillation.  I have reviewed her medical records.    She has been diagnosed with hypertension, venous stasis, lymphedema, obstructive sleep apnea (does not use CPAP), obesity, and macular degeneration.  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     In 2021, she was noted to be in asymptomatic atrial fibrillation and was rate controlled naturally.  She was placed on rivaroxaban.  Echocardiogram was stable.  24-hour Holter monitor showed atrial fibrillation with average heart rate of 59 bpm.     In 2023, echocardiogram showed EF 75%, grade 2 diastolic dysfunction, left ventricle borderline dilated, left atrial volume increased, right atrium mildly dilated, moderate thickening of the aortic valve, mild tricuspid regurgitation, and moderate pulmonary hypertension (RVSP 48 mmHg).    In 2024, atenolol was increased for tachycardia. I referred her to the lymphedema clinic and she did not go.     In , I increased her atenolol to 25 mg daily and valsartan to 160 mg daily.     She presents today with her sister accompanying her. Despite increasing atenolol, she still experiences intermittent fast heartbeats.  When she has these fast heartbeats, she denies any other symptoms. Her lower extremity edema is unchanged and she reports some lightheadedness with position changes.  She denies chest pain, shortness of breath, syncope, or bleeding.  Blood pressure elevated today.  She is scheduled for right eye cataract surgery next week.      Past Medical History:   Diagnosis Date     Abnormal ECG 06/05/2021    Anxiety     Arthritis     Asthma     Used to use inhaler but dont anymore.    Atrial fibrillation     Cancer     Superficial on face    Cholelithiasis     Coronary artery disease     Hypertension     Legally blind     Low back pain     Macular degeneration     Obesity     Pulmonary hypertension     Sleep apnea     Urinary tract infection     Visual impairment     macular degeneration       Past Surgical History:   Procedure Laterality Date    CHOLECYSTECTOMY  1974       Social History     Socioeconomic History    Marital status: Single   Tobacco Use    Smoking status: Never    Smokeless tobacco: Never   Vaping Use    Vaping status: Never Used   Substance and Sexual Activity    Alcohol use: Never    Drug use: Never     Types: Hydrocodone     Comment: Pt is prescribe hydrocodone    Sexual activity: Never       Family History   Problem Relation Age of Onset    Heart attack Mother 86    Stroke Mother     Heart disease Mother 80    Hypertension Mother     Heart failure Mother     Heart attack Brother 70    Asthma Brother     Hyperlipidemia Brother     Thyroid disease Brother     Arthritis Brother     Heart failure Father     Asthma Father     Kidney disease Sister         Stage 5 on dialysis       The following portion of the patient's history were reviewed and updated as appropriate: past medical history, past surgical history, past social history, past family history, allergies, current medications, and problem list.    Review of Systems   Constitutional: Negative.   Cardiovascular:  Positive for leg swelling and palpitations.   Respiratory: Negative.     Hematologic/Lymphatic: Negative.    Musculoskeletal:  Positive for myalgias.   Neurological:  Positive for light-headedness.       Allergies   Allergen Reactions    Penicillins Hives     Several years ago - did seek aid at an emergency department but no SOB/anaphylaxis symptoms noted     Moxifloxacin Other (See Comments)     PUPILS IN HER  "EYES WERE VERY LARGE     Paco-Bacit-Poly-Lidocaine Rash         Current Outpatient Medications:     atenolol (TENORMIN) 25 MG tablet, Take 1 tablet by mouth daily    ketorolac (ACULAR) 0.5 % ophthalmic solution, INSTILL 1 DROP INTO LEFT EYE 4 TIMES A DAY, Disp: , Rfl: 3    Lasix 40 MG tablet, Take 1 tablet by mouth 2 (Two) Times a Day., Disp: 180 tablet, Rfl: 3    multivitamin with minerals tablet tablet, Take 1 tablet by mouth 2 (Two) Times a Day., Disp: , Rfl:     rivaroxaban (Xarelto) 20 MG tablet, Take 1 tablet by mouth Daily., Disp: 90 tablet, Rfl: 3    valsartan (DIOVAN) 160 MG tablet, Take 1 tablet by mouth Every Morning., Disp: 90 tablet, Rfl: 3    VITAMIN D PO, Take  by mouth Daily., Disp: , Rfl:          Objective:     Vitals:    07/12/24 0946   BP: 140/72   Pulse: 55   Weight: 95.7 kg (211 lb)   Height: 157.5 cm (62\")     Body mass index is 38.59 kg/m².    PHYSICAL EXAM:    Vitals Reviewed.   General Appearance: No acute distress, well developed and well nourished. Obese.   HENT: No hearing loss noted.    Respiratory: No signs of respiratory distress. Respiration rhythm and depth normal.  Clear to auscultation.   Cardiovascular:  Jugular Venous Pressure: Normal  Heart Rate and Rhythm: Irregularly, irregular.  Bradycardic.  Heart Sounds: Normal S1 and S2. No S3 or S4 noted.  Murmurs: No murmurs noted. No rubs, thrills, or gallops.   Lower Extremities: Bilateral lower extremity edema noted.  Musculoskeletal: Normal movement of extremities.  Skin: General appearance normal.    Psychiatric: Patient alert and oriented to person, place, and time. Speech and behavior appropriate. Normal mood and affect.       ECG 12 Lead    Date/Time: 7/12/2024 9:48 AM  Performed by: Lori Rojas APRN    Authorized by: Lori Rojas APRN  Comparison: compared with previous ECG from 6/5/2024  Similar to previous ECG  Rhythm: atrial fibrillation  Rate: bradycardic  BPM: 55  Conduction: non-specific intraventricular " conduction delay  ST Segments: ST segments normal  T Waves: T waves normal  QRS axis: normal    Clinical impression: abnormal EKG            Assessment:       Diagnosis Plan   1. Permanent atrial fibrillation        2. Racing heart beat        3. Primary hypertension        4. Grade II diastolic dysfunction        5. Pulmonary hypertension  rivaroxaban (Xarelto) 20 MG tablet      6. Pulmonary hypertension, unspecified  Lasix 40 MG tablet      7. Obstructive sleep apnea syndrome        8. Lymphedema        9. Obesity (BMI 30-39.9)        10. Encounter for pre-operative cardiovascular clearance               Plan:       1/2.  Permanent Atrial Fibrillation: She reports occasional heart racing/tachycardia that is bothersome to her.  She felt like her heart was racing today in the office and it was not.  I will do a trial of increasing atenolol to 25 mg twice per day.  She will monitor her blood pressure and heart rates at home.  We need to keep a close eye on her heart rate since they typically run low.  I will follow-up with her via phone in 1 month to see how she is doing.  [id number] score of 5 and remains on rivaroxaban.    3.  Hypertension: Blood pressure remains elevated.    4.  Grade 2 diastolic dysfunction.    5.  Moderate pulmonary hypertension noted on last echocardiogram.  This would most likely improve if she was compliant with her CPAP machine.    6.  Obstructive sleep apnea: Noncompliant with CPAP.    7.  Chronic lower extremity edema.  Multifactorial.  I referred her to the lymphedema clinic.  Last time she did not go.    8.  Obesity: Body mass index is 38.59 kg/m².     9.  She is at acceptable risk from a cardiac standpoint to proceed with her cataract surgery.  She is that she does not need to hold her rivaroxaban before the procedure according to the ophthalmologist instructions.    10.  I will check on her in 1 month via telephone.  She will keep her scheduled appointment with Dr. Miller in April  2025.    As always, it has been a pleasure to participate in your patient's care. Thank you.         Sincerely,         YISEL Vergara  [facility] Cardiology      Dictated utilizing Dragon Dictation

## 2024-07-18 ENCOUNTER — TELEPHONE (OUTPATIENT)
Dept: CARDIOLOGY | Facility: CLINIC | Age: 84
End: 2024-07-18
Payer: MEDICARE

## 2024-07-18 NOTE — TELEPHONE ENCOUNTER
Caller: Tamara Singh    Relationship: Self    Best call back number: [phone]     What form or medical record are you requesting: AFTER VISIT SUMMARY    Who is requesting this form or medical record from you: PATIENT    How would you like to receive the form or medical records (pick-up, mail, fax): MAIL    If mail, what is the address:    [address]   [place] [id number]       Timeframe paperwork needed: ASAP    Additional notes: PT STATES SHE FORGOT TO ASK FOR A PRINTOUT OF HER AFTER VISIT SUMMARY WHEN SHE LEFT HER APPT ON 07.12.24 AND IS ASKING IF PHYSICAL COPY CAN BE MAILED TO HER FOR HER RECORDS -

## 2024-07-25 ENCOUNTER — TELEPHONE (OUTPATIENT)
Dept: SURGERY | Facility: CLINIC | Age: 84
End: 2024-07-25
Payer: MEDICARE

## 2024-07-31 NOTE — TELEPHONE ENCOUNTER
Caller: Tamara Singh    Relationship: Self    Best call back number: [phone]      What form or medical record are you requesting: AFTER VISIT SUMMARY     Who is requesting this form or medical record from you: PATIENT     How would you like to receive the form or medical records (pick-up, mail, fax): MAIL     If mail, what is the address:     [address] [facility] [address]         Timeframe paperwork needed: ASAP     Additional notes: PT FOLLOWING UP AS SHE HAS NOT RECEIVED ANYTHING IN THE MAIL SO SHE WAS CONFIRMING IF AFTER VISIT SUMMARY COULD BE SENT -

## 2024-08-05 RX ORDER — VALSARTAN 160 MG/1
160 TABLET ORAL EVERY MORNING
Qty: 90 TABLET | Refills: 3 | Status: SHIPPED | OUTPATIENT
Start: 2024-08-05

## 2024-08-12 ENCOUNTER — TELEPHONE (OUTPATIENT)
Dept: CARDIOLOGY | Facility: CLINIC | Age: 84
End: 2024-08-12
Payer: MEDICARE

## 2024-08-13 NOTE — TELEPHONE ENCOUNTER
Called Tamara Singh for additional information.    Patient reports she is still have episodes of heart racing, but the episodes are not occurring as much.  Patient reports she has been checking her BP/HR at home, but does not have her readings with her.  She stated that the readings have been pretty good/normal.  Requested patient return call to office with BP and HR readings when she returns home and she stated she will do this.    Thank you,  Gemini HOPPER RN  Triage Nurse TONIE   10:08 EDT

## 2024-08-13 NOTE — TELEPHONE ENCOUNTER
In July 2024, increased atenolol to 25 mg twice per day.  She reported that her heart has been racing.  Please call to see if she still having the heart racing and reassess home blood pressure and heart rates.    If blood pressure, heart rates, and heart racing are all stable please continue with that current dosage.  Thank you

## 2024-09-06 ENCOUNTER — TELEPHONE (OUTPATIENT)
Dept: SURGERY | Facility: CLINIC | Age: 84
End: 2024-09-06
Payer: MEDICARE

## 2024-09-18 ENCOUNTER — HOSPITAL ENCOUNTER (OUTPATIENT)
Dept: CT IMAGING | Facility: HOSPITAL | Age: 84
Discharge: HOME | End: 2024-09-18
Payer: MEDICARE

## 2024-09-18 DIAGNOSIS — Q27.30 ARTERIOVENOUS MALFORMATION: ICD-10-CM

## 2024-09-18 LAB — CREAT BLDA-MCNC: 0.8 MG/DL (ref 0.6–1.3)

## 2024-09-18 PROCEDURE — 25510000001 IOPAMIDOL 61 % SOLUTION

## 2024-09-18 PROCEDURE — 82565 ASSAY OF CREATININE: CPT

## 2024-09-18 PROCEDURE — 71260 CT THORAX DX C+: CPT

## 2024-09-18 RX ORDER — IOPAMIDOL 612 MG/ML
100 INJECTION, SOLUTION INTRAVASCULAR
Status: COMPLETED | OUTPATIENT
Start: 2024-09-18 | End: 2024-09-18

## 2024-09-18 RX ADMIN — IOPAMIDOL 75 ML: 612 INJECTION, SOLUTION INTRAVENOUS at 09:56

## 2024-09-20 ENCOUNTER — TELEPHONE (OUTPATIENT)
Dept: SURGERY | Facility: CLINIC | Age: 84
End: 2024-09-20
Payer: MEDICARE

## 2024-10-01 ENCOUNTER — TELEMEDICINE (OUTPATIENT)
Dept: SURGERY | Facility: CLINIC | Age: 84
End: 2024-10-01
Payer: MEDICARE

## 2024-10-01 DIAGNOSIS — Q27.30 ARTERIOVENOUS MALFORMATION: Primary | ICD-10-CM

## 2024-10-01 PROCEDURE — 99212 OFFICE O/P EST SF 10 MIN: CPT

## 2024-10-04 NOTE — PROGRESS NOTES
"Chief Complaint  Left lower lobe AV malformation    Subjective        Tamara Singh presents to [facility] THORACIC SURGERY  History of Present Illness  Tamara Singh is a pleasant 84-year-old female who presents today via telemedicine visit for continued follow-up of her left lower lobe AV malformation.  Patient had difficulty with Audio Video component of her visit therefore the visit was continued via phone and patient was agreeable to continue.    Her left lung AV malformation was first identified on CT imaging in 2014.  She was initially evaluated by Dr. Chris Sy in 2019 who discussed routine surveillance versus high risk thoracic surgical resection of this AV malformation.  Patient elected to proceed with serial monitoring with CT imaging.    She was last seen by me in the office on 9/18/2023 during this time most recent CT of the chest demonstrated a slightly decrease in size of the left pulmonary vascular malformation compared to prior CT imaging along with resolution of new areas of bilateral groundglass opacities and nodularity seen previously.  Continued surveillance with annual CT imaging was recommended.  She presents to discuss these results today.    Objective   Vital Signs: Deferred secondary to telemedicine visit  There were no vitals taken for this visit.  Estimated body mass index is 38.59 kg/m² as calculated from the following:    Height as of 7/12/24: 157.5 cm (62\").    Weight as of 7/12/24: 95.7 kg (211 lb).          Physical Exam deferred secondary to telemedicine visit  Result Review :      Data reviewed : Radiologic studies CT chest performed 9/18/2024        Left lower lobe AV malformation again demonstrated.  Not significantly changed in size measuring 2.6 x 2.5 cm in greatest dimension.  No appreciable lymphadenopathy.  Nonspecific enlargement of main pulmonary artery.  No pleural pericardial effusion.  No suspicious lung nodules.     Assessment and Plan   Diagnoses and " all orders for this visit:    1. Arteriovenous malformation (Primary)  -     CT Chest With Contrast; Future  -     POC Creatinine; Future    I have dependently reviewed the most recent CT chest with contrast with demonstrates no significant change in size or appearance of the left lower lobe AV malformation.  Patient reports that she has been having difficulty with blood pressure maintenance and has been working closely with her PCP for this.  Recommend continued surveillance with a CT chest with IV contrast to be performed in 1 year.  I have instructed her to reach out to any needs arise prior to the scheduled appointment.  POC creatinine to be performed prior to CT chest with contrast.  Ordered as well.     I spent 26 minutes caring for Tamara on this date of service. This time includes time spent by me in the following activities:preparing for the visit, reviewing tests, obtaining and/or reviewing a separately obtained history, ordering medications, tests, or procedures, documenting information in the medical record, and independently interpreting results and communicating that information with the patient/family/caregiver  Follow Up   Return in about 1 year (around 10/1/2025), or if symptoms worsen or fail to improve.  Patient was given instructions and counseling regarding her condition or for health maintenance advice. Please see specific information pulled into the AVS if appropriate.     Mode of Visit: Video  Location of patient: Home  Location of provider: [facility]  You have chosen to receive care through a telehealth visit.  The patient has signed the video visit consent form.

## 2024-10-11 DIAGNOSIS — I48.21 PERMANENT ATRIAL FIBRILLATION: ICD-10-CM

## 2024-10-11 DIAGNOSIS — I51.89 GRADE II DIASTOLIC DYSFUNCTION: ICD-10-CM

## 2024-10-11 DIAGNOSIS — R00.0 RACING HEART BEAT: Primary | ICD-10-CM

## 2024-10-11 DIAGNOSIS — I10 PRIMARY HYPERTENSION: ICD-10-CM

## 2024-10-11 RX ORDER — ATENOLOL 25 MG/1
25 TABLET ORAL 2 TIMES DAILY
Qty: 180 TABLET | Refills: 0 | Status: SHIPPED | OUTPATIENT
Start: 2024-10-11

## 2024-10-11 RX ORDER — ATENOLOL 25 MG/1
25 TABLET ORAL 2 TIMES DAILY
Qty: 180 TABLET | Refills: 0 | Status: SHIPPED | OUTPATIENT
Start: 2024-10-11 | End: 2024-10-11 | Stop reason: SDUPTHER

## 2024-10-12 DIAGNOSIS — I48.21 PERMANENT ATRIAL FIBRILLATION: ICD-10-CM

## 2024-10-12 DIAGNOSIS — I10 PRIMARY HYPERTENSION: ICD-10-CM

## 2024-10-12 DIAGNOSIS — R00.0 RACING HEART BEAT: ICD-10-CM

## 2024-10-12 DIAGNOSIS — I51.89 GRADE II DIASTOLIC DYSFUNCTION: ICD-10-CM

## 2024-10-15 RX ORDER — ATENOLOL 25 MG/1
25 TABLET ORAL DAILY
Qty: 90 TABLET | Refills: 1 | OUTPATIENT
Start: 2024-10-15

## 2024-12-10 ENCOUNTER — TELEPHONE (OUTPATIENT)
Dept: CARDIOLOGY | Facility: CLINIC | Age: 84
End: 2024-12-10
Payer: MEDICARE

## 2024-12-10 DIAGNOSIS — I27.20 PULMONARY HYPERTENSION: ICD-10-CM

## 2024-12-10 NOTE — TELEPHONE ENCOUNTER
Caller: PT    Relationship:PT    Callback number: [phone]    Is it ok to leave a message: [x] Yes [] No    Requested medication for samples: XARELTO    How much medication does the patient currently have left: ENOUGH TIL LAST WEEK OF DEC    Who will be picking up the samples: PT'S SISTER, ABBY CONROY    Do you need information about patient financial assistance for this medication: [] Yes [x] No

## 2024-12-10 NOTE — TELEPHONE ENCOUNTER
Called and informed patient that samples are available for them at the [facility] location and they can  whenever they are able to.    Patient verbalized understanding and call was ended.

## 2025-03-10 ENCOUNTER — TELEPHONE (OUTPATIENT)
Dept: CARDIOLOGY | Facility: CLINIC | Age: 85
End: 2025-03-10
Payer: MEDICARE

## 2025-03-10 NOTE — TELEPHONE ENCOUNTER
Caller: Tamara Singh    Relationship to patient: Self    Best call back number: [phone]    Patient is needing: PT WANTS TO DISCUSS CLINICAL CONCERNS. SAYS HR IS SUPER LOW IN THE MORNINGS (42-47) AND SHE'S NOT SURE WHY. HAS AN APPT 4/1 BUT THINKS SHE NEEDS TO COME SOONER. BP IS ALSO ELEVATED (DID NOT GIVE THE READING)

## 2025-03-11 ENCOUNTER — HOSPITAL ENCOUNTER (OUTPATIENT)
Dept: CARDIOLOGY | Facility: HOSPITAL | Age: 85
Discharge: HOME | End: 2025-03-11
Admitting: INTERNAL MEDICINE
Payer: MEDICARE

## 2025-03-11 ENCOUNTER — OFFICE VISIT (OUTPATIENT)
Dept: CARDIOLOGY | Facility: CLINIC | Age: 85
End: 2025-03-11
Payer: MEDICARE

## 2025-03-11 VITALS
HEART RATE: 74 BPM | DIASTOLIC BLOOD PRESSURE: 84 MMHG | SYSTOLIC BLOOD PRESSURE: 126 MMHG | OXYGEN SATURATION: 96 % | BODY MASS INDEX: 38.46 KG/M2 | HEIGHT: 62 IN | WEIGHT: 209 LBS

## 2025-03-11 DIAGNOSIS — G47.33 OBSTRUCTIVE SLEEP APNEA SYNDROME: ICD-10-CM

## 2025-03-11 DIAGNOSIS — I27.20 PULMONARY HYPERTENSION: Primary | ICD-10-CM

## 2025-03-11 DIAGNOSIS — M79.89 SWELLING OF BOTH LOWER EXTREMITIES: ICD-10-CM

## 2025-03-11 DIAGNOSIS — I51.89 GRADE II DIASTOLIC DYSFUNCTION: ICD-10-CM

## 2025-03-11 DIAGNOSIS — R06.02 SOB (SHORTNESS OF BREATH): ICD-10-CM

## 2025-03-11 DIAGNOSIS — I48.19 ATRIAL FIBRILLATION, PERSISTENT: ICD-10-CM

## 2025-03-11 DIAGNOSIS — I87.2 VENOUS STASIS DERMATITIS: ICD-10-CM

## 2025-03-11 DIAGNOSIS — I10 PRIMARY HYPERTENSION: ICD-10-CM

## 2025-03-11 DIAGNOSIS — I48.21 PERMANENT ATRIAL FIBRILLATION: ICD-10-CM

## 2025-03-11 DIAGNOSIS — I83.11 VARICOSE VEINS OF BOTH LOWER EXTREMITIES WITH INFLAMMATION: ICD-10-CM

## 2025-03-11 DIAGNOSIS — I89.0 LYMPHEDEMA: ICD-10-CM

## 2025-03-11 DIAGNOSIS — I83.12 VARICOSE VEINS OF BOTH LOWER EXTREMITIES WITH INFLAMMATION: ICD-10-CM

## 2025-03-11 DIAGNOSIS — I27.20 PULMONARY HYPERTENSION: ICD-10-CM

## 2025-03-11 LAB
ALBUMIN SERPL-MCNC: 4.4 G/DL (ref 3.5–5.2)
ALBUMIN/GLOB SERPL: 1.1 G/DL
ALP SERPL-CCNC: 75 U/L (ref 39–117)
ALT SERPL W P-5'-P-CCNC: 19 U/L (ref 1–33)
ANION GAP SERPL CALCULATED.3IONS-SCNC: 15 MMOL/L (ref 5–15)
AST SERPL-CCNC: 31 U/L (ref 1–32)
BILIRUB SERPL-MCNC: 0.9 MG/DL (ref 0–1.2)
BUN SERPL-MCNC: 24 MG/DL (ref 8–23)
BUN/CREAT SERPL: 31.2 (ref 7–25)
CALCIUM SPEC-SCNC: 10.1 MG/DL (ref 8.6–10.5)
CHLORIDE SERPL-SCNC: 96 MMOL/L (ref 98–107)
CO2 SERPL-SCNC: 25 MMOL/L (ref 22–29)
CREAT SERPL-MCNC: 0.77 MG/DL (ref 0.57–1)
DEPRECATED RDW RBC AUTO: 46.6 FL (ref 37–54)
EGFRCR SERPLBLD CKD-EPI 2021: 76.2 ML/MIN/1.73
ERYTHROCYTE [DISTWIDTH] IN BLOOD BY AUTOMATED COUNT: 15.4 % (ref 12.3–15.4)
GLOBULIN UR ELPH-MCNC: 4.1 GM/DL
GLUCOSE SERPL-MCNC: 105 MG/DL (ref 65–99)
HCT VFR BLD AUTO: 46.9 % (ref 34–46.6)
HGB BLD-MCNC: 15.1 G/DL (ref 12–15.9)
MCH RBC QN AUTO: 26.8 PG (ref 26.6–33)
MCHC RBC AUTO-ENTMCNC: 32.2 G/DL (ref 31.5–35.7)
MCV RBC AUTO: 83.2 FL (ref 79–97)
NT-PROBNP SERPL-MCNC: 1609 PG/ML (ref 0–1800)
PLATELET # BLD AUTO: 664 10*3/MM3 (ref 140–450)
PMV BLD AUTO: 9.1 FL (ref 6–12)
POTASSIUM SERPL-SCNC: 3.9 MMOL/L (ref 3.5–5.2)
PROT SERPL-MCNC: 8.5 G/DL (ref 6–8.5)
RBC # BLD AUTO: 5.64 10*6/MM3 (ref 3.77–5.28)
SODIUM SERPL-SCNC: 136 MMOL/L (ref 136–145)
T-UPTAKE NFR SERPL: 1.03 TBI (ref 0.8–1.3)
T4 SERPL-MCNC: 8.79 MCG/DL (ref 4.5–11.7)
TSH SERPL DL<=0.05 MIU/L-ACNC: 3.24 UIU/ML (ref 0.27–4.2)
WBC NRBC COR # BLD AUTO: 8.43 10*3/MM3 (ref 3.4–10.8)

## 2025-03-11 PROCEDURE — 80053 COMPREHEN METABOLIC PANEL: CPT | Performed by: INTERNAL MEDICINE

## 2025-03-11 PROCEDURE — 84443 ASSAY THYROID STIM HORMONE: CPT | Performed by: INTERNAL MEDICINE

## 2025-03-11 PROCEDURE — 83880 ASSAY OF NATRIURETIC PEPTIDE: CPT | Performed by: INTERNAL MEDICINE

## 2025-03-11 PROCEDURE — 84436 ASSAY OF TOTAL THYROXINE: CPT | Performed by: INTERNAL MEDICINE

## 2025-03-11 PROCEDURE — 84479 ASSAY OF THYROID (T3 OR T4): CPT | Performed by: INTERNAL MEDICINE

## 2025-03-11 PROCEDURE — 85027 COMPLETE CBC AUTOMATED: CPT | Performed by: INTERNAL MEDICINE

## 2025-03-11 PROCEDURE — 36415 COLL VENOUS BLD VENIPUNCTURE: CPT

## 2025-03-11 NOTE — PROGRESS NOTES
"    Subjective:     Encounter Date:03/11/25      Patient ID: Tamara Singh is a 85 y.o. female.    Chief Complaint:  History of Present Illness    Dear Dr. Sauceda,    I had the pleasure of seeing this patient in the office today for persistent atrial fibrillation.    She comes in states she just has not been feeling good.  She has been weak, she has been tired, she has had some lower extremity edema that seems more prominent, she has some shortness of breath, she has weakness and fatigue, no presyncope or syncope.  Denies any chills or fevers.  No recent blood work, came in because she was worried if something could be going on.    She has a history of sleep apnea but is not currently using CPAP.  She thinks is probably been 3 years since she was on CPAP.  She had lost some weight and so she thought she may not need it.      This patient has no other known cardiac history.  This patient has no history of coronary artery disease, congestive heart failure, rheumatic fever, rheumatic heart disease, congenital heart disease or heart murmur.  This patient has never required invasive cardiovascular evaluation.    The following portions of the patient's history were reviewed and updated as appropriate: allergies, current medications, past family history, past medical history, past social history, past surgical history and problem list.    Procedures       Objective:     Vitals:    03/11/25 1506   BP: 126/84   Pulse: 74   SpO2: 96%   Weight: 94.8 kg (209 lb)   Height: 157.5 cm (62\")     Body mass index is 38.23 kg/m².      Vitals reviewed.   Constitutional:       General: Not in acute distress.     Appearance: Well-developed. Not diaphoretic.   Eyes:      General:         Right eye: No discharge.         Left eye: No discharge.      Conjunctiva/sclera: Conjunctivae normal.      Pupils: Pupils are equal, round, and reactive to light.   HENT:      Head: Normocephalic and atraumatic.      Nose: Nose normal.   Neck:      " "Thyroid: No thyromegaly.      Trachea: No tracheal deviation.      Lymphadenopathy: No cervical adenopathy.   Pulmonary:      Effort: Pulmonary effort is normal. No respiratory distress.      Breath sounds: Normal breath sounds. No stridor.   Chest:      Chest wall: Not tender to palpatation.   Cardiovascular:      Normal rate. Irregular rhythm.      Murmurs: There is no murmur.      . No S3 gallop. No click. No rub.   Pulses:     Intact distal pulses.   Edema:     Peripheral edema present.  Abdominal:      General: Bowel sounds are normal. There is no distension.      Palpations: Abdomen is soft. There is no abdominal mass.      Tenderness: There is no abdominal tenderness. There is no guarding or rebound.   Musculoskeletal: Normal range of motion.         General: No tenderness or deformity.      Cervical back: Normal range of motion and neck supple. Skin:     General: Skin is warm and dry.      Findings: No erythema or rash.   Neurological:      Mental Status: Alert and oriented to person, place, and time.      Deep Tendon Reflexes: Reflexes are normal and symmetric.   Psychiatric:         Thought Content: Thought content normal.         Data and records reviewed:     Lab Results   Component Value Date    GLUCOSE 94 04/01/2024    BUN 22 04/01/2024    CREATININE 0.80 09/18/2024    EGFRIFNONA 77 11/24/2021    EGFRIFAFRI 88 11/24/2021    BCR 28.2 (H) 04/01/2024    K 3.9 04/01/2024    CO2 27.0 04/01/2024    CALCIUM 9.7 04/01/2024    ALBUMIN 4.4 09/27/2023    AST 26 09/27/2023    ALT 12 09/27/2023     No results found for: \"CHOL\"  Lab Results   Component Value Date    TRIG 60 09/16/2022     Lab Results   Component Value Date     (H) 09/16/2022     Lab Results   Component Value Date    LDL 43 09/16/2022     Lab Results   Component Value Date    VLDL 11 09/16/2022     No results found for: \"LDLHDL\"      No radiology results for the last 90 days.    Results for orders placed during the hospital encounter of " 03/03/23    Adult Transthoracic Echo Complete W/ Cont if Necessary Per Protocol    Interpretation Summary    Left ventricular systolic function is hyperdynamic (EF > 70%). Calculated left ventricular EF = 74.9% Normal left ventricular wall thickness noted. The left ventricular cavity is borderline dilated. All left ventricular wall segments contract normally. Left ventricular diastolic function is consistent with (grade II w/high LAP) pseudonormalization.    Left atrial volume is mildly increased.  The right atrial cavity is mildly dilated.    The aortic valve is abnormal in structure. There is moderate thickening of the aortic valve. The aortic valve appears trileaflet.    Mild tricuspid valve regurgitation is present. Estimated right ventricular systolic pressure from tricuspid regurgitation is moderately elevated (45-55 mmHg). Calculated right ventricular systolic pressure from tricuspid regurgitation is 48.7 mmHg.          Assessment:          Diagnosis Plan   1. Pulmonary hypertension  BNP    Comprehensive Metabolic Panel    CBC (No Diff)    Thyroid Panel With TSH    Adult Transthoracic Echo Complete W/ Cont if Necessary Per Protocol    Holter Monitor - 24 Hour      2. Primary hypertension  BNP    Comprehensive Metabolic Panel    CBC (No Diff)    Thyroid Panel With TSH    Adult Transthoracic Echo Complete W/ Cont if Necessary Per Protocol    Holter Monitor - 24 Hour      3. Grade II diastolic dysfunction  BNP    Comprehensive Metabolic Panel    CBC (No Diff)    Thyroid Panel With TSH    Adult Transthoracic Echo Complete W/ Cont if Necessary Per Protocol    Holter Monitor - 24 Hour      4. Atrial fibrillation, persistent  BNP    Comprehensive Metabolic Panel    CBC (No Diff)    Thyroid Panel With TSH    Adult Transthoracic Echo Complete W/ Cont if Necessary Per Protocol    Holter Monitor - 24 Hour      5. SOB (shortness of breath)  BNP    Comprehensive Metabolic Panel    CBC (No Diff)    Thyroid Panel With TSH     Adult Transthoracic Echo Complete W/ Cont if Necessary Per Protocol    Holter Monitor - 24 Hour      6. Swelling of both lower extremities  BNP    Comprehensive Metabolic Panel    CBC (No Diff)    Thyroid Panel With TSH    Adult Transthoracic Echo Complete W/ Cont if Necessary Per Protocol    Holter Monitor - 24 Hour      7. Varicose veins of both lower extremities with inflammation        8. Permanent atrial fibrillation        9. Venous stasis dermatitis        10. Obstructive sleep apnea syndrome        11. Lymphedema                 Plan:       1.  Persistent atrial fibrillation-cont Xarelto, rate appropriate with no rate control, consistent with sick sinus syndrome.  Previously seen by Dr. Lawton in the hospital, no indication for pacemaker. Feels generally bad, will get labs today, check holter, check echo  2.  Obstructive sleep apnea, not on CPAP, no recent evaluation, we referred to sleep medicine, not yet been in to be seen  3.  Chronic lymphedema- recheck labs  4.  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 Sy      Thank you very much for allowing us to participate in the care of this pleasant patient.  Please don't hesitate to call if I can be of assistance in any way.      Current Outpatient Medications:     atenolol (TENORMIN) 25 MG tablet, Take 1 tablet by mouth 2 (Two) Times a Day., Disp: 180 tablet, Rfl: 0    ketorolac (ACULAR) 0.5 % ophthalmic solution, INSTILL 1 DROP INTO LEFT EYE 4 TIMES A DAY, Disp: , Rfl: 3    Lasix 40 MG tablet, Take 1 tablet by mouth 2 (Two) Times a Day., Disp: 180 tablet, Rfl: 3    multivitamin with minerals tablet tablet, Take 1 tablet by mouth 2 (Two) Times a Day., Disp: , Rfl:     rivaroxaban (Xarelto) 20 MG tablet, Take 1 tablet by mouth Daily., Disp: 14 tablet, Rfl: 0    valsartan (DIOVAN) 160 MG tablet, Take 1 tablet by mouth Every Morning., Disp: 90 tablet, Rfl: 3    VITAMIN D PO, Take  by mouth Daily., Disp: , Rfl:          No follow-ups on file.

## 2025-03-12 ENCOUNTER — TELEPHONE (OUTPATIENT)
Dept: CARDIOLOGY | Facility: CLINIC | Age: 85
End: 2025-03-12
Payer: MEDICARE

## 2025-03-12 NOTE — TELEPHONE ENCOUNTER
Caller: Tamara Singh    Relationship: Self    Best call back number: [phone]    Caller requesting test results: PATIENT    What test was performed: LABS    When was the test performed: 3/11/25    Where was the test performed: [facility]    Additional notes: PT IS CALLING ABOUT LAB RESULTS

## 2025-03-13 ENCOUNTER — TELEPHONE (OUTPATIENT)
Dept: INTERNAL MEDICINE | Age: 85
End: 2025-03-13
Payer: MEDICARE

## 2025-03-13 NOTE — TELEPHONE ENCOUNTER
Please call patient.    I received a message from cardiologist.  I recommend she schedule follow up in primary office in near future to discuss some recent abnormal lab results and further investigate source of her fatigue.

## 2025-03-16 NOTE — PROGRESS NOTES
"    I N T E R N A L  M E D I C I N E  Yane Lama, APRN    ENCOUNTER DATE:  03/17/2025    Tamara Singh / 85 y.o. / female      CHIEF COMPLAINT / REASON FOR OFFICE VISIT     Hypertension and Foot Swelling      ASSESSMENT & PLAN     Diagnoses and all orders for this visit:    1. Elevated platelet count (Primary)  -     CBC & Differential    2. Peripheral vascular disease  -     Ambulatory Referral to Wound Clinic         SUMMARY/DISCUSSION  100% on RA.    He fatigue symptoms are most likely multifactorial, possible CHF, SALAS, pulmonary HTN all components.  Recommend further evaluation with ECHO, Holter and sleep medicine office as previously recommended by cardiology.  Will repeat CBC to assess platelet level.  Her increased hematocrit of 46.9 may be driven by pulmonary HTN and/ or sleep apnea.    She has evidence of likely PVD of bilateral extremities without unilateral redness, warmth, worsening swelling.  Recommend evaluation with wound clinic.  She defers to return to lymphedema clinic at this time.      30 minutes spent meeting with patient, assessment, developing plan of care, documentation.    Next Appointment with me: Visit date not found    Return in about 6 weeks (around 4/28/2025) for Medicare Wellness.      VITAL SIGNS     Visit Vitals  /80   Temp 97.4 °F (36.3 °C)   Ht 157.5 cm (62\")   Wt 94.3 kg (208 lb)   LMP  (LMP Unknown)   BMI 38.04 kg/m²             Wt Readings from Last 3 Encounters:   03/17/25 94.3 kg (208 lb)   03/11/25 94.8 kg (209 lb)   07/12/24 95.7 kg (211 lb)     Body mass index is 38.04 kg/m².        MEDICATIONS AT THE TIME OF OFFICE VISIT     Current Outpatient Medications on File Prior to Visit   Medication Sig Dispense Refill    atenolol (TENORMIN) 25 MG tablet Take 1 tablet by mouth 2 (Two) Times a Day. 180 tablet 0    ketorolac (ACULAR) 0.5 % ophthalmic solution INSTILL 1 DROP INTO LEFT EYE 4 TIMES A DAY  3    Lasix 40 MG tablet Take 1 tablet by mouth 2 (Two) Times a Day. 180 " "tablet 3    multivitamin with minerals tablet tablet Take 1 tablet by mouth 2 (Two) Times a Day.      rivaroxaban (Xarelto) 20 MG tablet Take 1 tablet by mouth Daily. 14 tablet 0    valsartan (DIOVAN) 160 MG tablet Take 1 tablet by mouth Every Morning. 90 tablet 3    VITAMIN D PO Take  by mouth Daily.       No current facility-administered medications on file prior to visit.        HISTORY OF PRESENT ILLNESS     Last seen September 2023.    Accompanied by her sister in law to today's visit.    Saw cardiology, Dr. Miller, on March 11, 2025 for Afib with pulmonary HTN follow up.  At that time, she complained of fatigue, worsening bilateral lower extremity edema.  Ordered Holter, ECHO.  Recommended sleep medicine evaluation.  March 2025 labs showed normal TSH and BNP.  CBC with elevated RBC 5.64, elevated hematocrit 46.9.  Platelets elevated at 664.  BUN elevated at 24.    Today, she tells me for several months, near the end of the day, prior to sleep onset, feels heart \"racing.\"  As a result, difficulty with sleep onset.  Averaging 6 hours a night of sleep.  BP remains well controlled on valsartan 160 mg daily, atenolol 25 mg BID.  She remains compliant with rivaroxaban 20 mg daily, furosemide 40 mg BID.  Has chronic lymphedema but swelling has been worsening slowly bilaterally over the last few months.  Left ankle is hurting today - she attributes to an increase in swelling due to not taking her morning dose of furosemide since she was traveling today.  No trauma or injury.        Patient Care Team:  Yane Lama APRN as PCP - General (Family Medicine)  Evens Miller III, MD as Cardiologist (Cardiology)    REVIEW OF SYSTEMS     Review of Systems   Constitutional:  Positive for fatigue. Negative for chills, fever and unexpected weight change.   Respiratory:  Negative for cough, chest tightness and shortness of breath.    Cardiovascular:  Positive for leg swelling. Negative for chest pain and palpitations. "   Gastrointestinal:  Negative for abdominal pain.   Musculoskeletal:  Positive for myalgias.   Neurological:  Negative for dizziness, weakness, light-headedness and headaches.   Psychiatric/Behavioral:  The patient is not nervous/anxious.           PHYSICAL EXAMINATION     Physical Exam  Vitals reviewed.   Constitutional:       General: She is not in acute distress.     Appearance: Normal appearance. She is not ill-appearing, toxic-appearing or diaphoretic.   HENT:      Head: Normocephalic and atraumatic.   Cardiovascular:      Rate and Rhythm: Normal rate. Rhythm irregular.      Pulses: Normal pulses.      Heart sounds: Normal heart sounds.   Pulmonary:      Effort: Pulmonary effort is normal.      Breath sounds: Normal breath sounds.   Musculoskeletal:      Right lower leg: Edema present.      Left lower leg: Edema present.   Skin:     General: Skin is warm and dry.      Findings: No erythema.      Comments: Thin skin with reduced hair growth on bilateral extremities, pulses palpable, neurologically intact.  No warmth, redness.     Neurological:      Mental Status: She is alert and oriented to person, place, and time. Mental status is at baseline.   Psychiatric:         Mood and Affect: Mood normal.         Behavior: Behavior normal.         Thought Content: Thought content normal.         Judgment: Judgment normal.           REVIEWED DATA     Labs:           Imaging:            Medical Tests:           Summary of old records / correspondence / consultant report:           Request outside records:

## 2025-03-17 ENCOUNTER — OFFICE VISIT (OUTPATIENT)
Dept: INTERNAL MEDICINE | Age: 85
End: 2025-03-17
Payer: MEDICARE

## 2025-03-17 VITALS
HEIGHT: 62 IN | SYSTOLIC BLOOD PRESSURE: 128 MMHG | BODY MASS INDEX: 38.28 KG/M2 | TEMPERATURE: 97.4 F | DIASTOLIC BLOOD PRESSURE: 80 MMHG | WEIGHT: 208 LBS

## 2025-03-17 DIAGNOSIS — I73.9 PERIPHERAL VASCULAR DISEASE: Primary | ICD-10-CM

## 2025-03-17 DIAGNOSIS — R79.89 ELEVATED PLATELET COUNT: ICD-10-CM

## 2025-03-17 LAB
BASOPHILS # BLD AUTO: 0.05 10*3/MM3 (ref 0–0.2)
BASOPHILS NFR BLD AUTO: 0.6 % (ref 0–1.5)
EOSINOPHIL # BLD AUTO: 0.13 10*3/MM3 (ref 0–0.4)
EOSINOPHIL NFR BLD AUTO: 1.6 % (ref 0.3–6.2)
ERYTHROCYTE [DISTWIDTH] IN BLOOD BY AUTOMATED COUNT: 15.7 % (ref 12.3–15.4)
HCT VFR BLD AUTO: 48.8 % (ref 34–46.6)
HGB BLD-MCNC: 15.1 G/DL (ref 12–15.9)
IMM GRANULOCYTES # BLD AUTO: 0.03 10*3/MM3 (ref 0–0.05)
IMM GRANULOCYTES NFR BLD AUTO: 0.4 % (ref 0–0.5)
LYMPHOCYTES # BLD AUTO: 2.36 10*3/MM3 (ref 0.7–3.1)
LYMPHOCYTES NFR BLD AUTO: 28.6 % (ref 19.6–45.3)
MCH RBC QN AUTO: 26.1 PG (ref 26.6–33)
MCHC RBC AUTO-ENTMCNC: 30.9 G/DL (ref 31.5–35.7)
MCV RBC AUTO: 84.4 FL (ref 79–97)
MONOCYTES # BLD AUTO: 0.81 10*3/MM3 (ref 0.1–0.9)
MONOCYTES NFR BLD AUTO: 9.8 % (ref 5–12)
NEUTROPHILS # BLD AUTO: 4.88 10*3/MM3 (ref 1.7–7)
NEUTROPHILS NFR BLD AUTO: 59 % (ref 42.7–76)
NRBC BLD AUTO-RTO: 0 /100 WBC (ref 0–0.2)
PLATELET # BLD AUTO: 702 10*3/MM3 (ref 140–450)
RBC # BLD AUTO: 5.78 10*6/MM3 (ref 3.77–5.28)
WBC # BLD AUTO: 8.26 10*3/MM3 (ref 3.4–10.8)

## 2025-03-17 PROCEDURE — 3079F DIAST BP 80-89 MM HG: CPT

## 2025-03-17 PROCEDURE — 99214 OFFICE O/P EST MOD 30 MIN: CPT

## 2025-03-17 PROCEDURE — 1160F RVW MEDS BY RX/DR IN RCRD: CPT

## 2025-03-17 PROCEDURE — 1125F AMNT PAIN NOTED PAIN PRSNT: CPT

## 2025-03-17 PROCEDURE — 1159F MED LIST DOCD IN RCRD: CPT

## 2025-03-17 PROCEDURE — 3074F SYST BP LT 130 MM HG: CPT

## 2025-03-17 PROCEDURE — G2211 COMPLEX E/M VISIT ADD ON: HCPCS

## 2025-03-18 DIAGNOSIS — R71.8 MICROCYTOSIS: ICD-10-CM

## 2025-03-18 DIAGNOSIS — D75.839 THROMBOCYTOSIS: Primary | ICD-10-CM

## 2025-03-18 DIAGNOSIS — R71.8 MICROCYTOSIS: Primary | ICD-10-CM

## 2025-03-18 DIAGNOSIS — R71.8 ELEVATED HEMATOCRIT: ICD-10-CM

## 2025-03-19 LAB
FERRITIN SERPL-MCNC: 26.1 NG/ML (ref 13–150)
FOLATE SERPL-MCNC: >20 NG/ML (ref 4.78–24.2)
IRON SATN MFR SERPL: 9 % (ref 20–50)
IRON SERPL-MCNC: 44 MCG/DL (ref 37–145)
TIBC SERPL-MCNC: 498 MCG/DL
UIBC SERPL-MCNC: 454 MCG/DL (ref 112–346)
VIT B12 SERPL-MCNC: 246 PG/ML (ref 211–946)

## 2025-03-20 ENCOUNTER — RESULTS FOLLOW-UP (OUTPATIENT)
Dept: INTERNAL MEDICINE | Age: 85
End: 2025-03-20
Payer: MEDICARE

## 2025-03-20 DIAGNOSIS — E61.1 IRON DEFICIENCY: Primary | ICD-10-CM

## 2025-03-20 DIAGNOSIS — E53.8 VITAMIN B12 DEFICIENCY: Primary | ICD-10-CM

## 2025-03-24 ENCOUNTER — TELEPHONE (OUTPATIENT)
Dept: INTERNAL MEDICINE | Age: 85
End: 2025-03-24

## 2025-03-24 NOTE — TELEPHONE ENCOUNTER
Caller: Tamara Singh    Relationship: Self    Best call back number: [phone]     What was the call regarding: PATIENT STATES HER SISTER IN LAW, ABBY SINGH, IS COMING TO  THE STOOL SAMPLE KIT FOR HER

## 2025-03-25 ENCOUNTER — HOSPITAL ENCOUNTER (OUTPATIENT)
Dept: CARDIOLOGY | Facility: HOSPITAL | Age: 85
Discharge: HOME | End: 2025-03-25
Admitting: INTERNAL MEDICINE
Payer: MEDICARE

## 2025-03-25 VITALS
BODY MASS INDEX: 38.28 KG/M2 | WEIGHT: 208 LBS | SYSTOLIC BLOOD PRESSURE: 130 MMHG | HEIGHT: 62 IN | DIASTOLIC BLOOD PRESSURE: 70 MMHG | HEART RATE: 80 BPM

## 2025-03-25 DIAGNOSIS — I27.20 PULMONARY HYPERTENSION: ICD-10-CM

## 2025-03-25 DIAGNOSIS — I10 PRIMARY HYPERTENSION: ICD-10-CM

## 2025-03-25 DIAGNOSIS — I51.89 GRADE II DIASTOLIC DYSFUNCTION: ICD-10-CM

## 2025-03-25 DIAGNOSIS — M79.89 SWELLING OF BOTH LOWER EXTREMITIES: ICD-10-CM

## 2025-03-25 DIAGNOSIS — I48.19 ATRIAL FIBRILLATION, PERSISTENT: ICD-10-CM

## 2025-03-25 DIAGNOSIS — R06.02 SOB (SHORTNESS OF BREATH): ICD-10-CM

## 2025-03-25 LAB
AORTIC ARCH: 1.9 CM
AORTIC DIMENSIONLESS INDEX: 0.55 (DI)
ASCENDING AORTA: 3.2 CM
AV MEAN PRESS GRAD SYS DOP V1V2: 6 MMHG
AV VMAX SYS DOP: 182 CM/SEC
BH CV ECHO MEAS - ACS: 1.5 CM
BH CV ECHO MEAS - AO MAX PG: 13.2 MMHG
BH CV ECHO MEAS - AO ROOT DIAM: 3.3 CM
BH CV ECHO MEAS - AO V2 VTI: 42 CM
BH CV ECHO MEAS - AVA(I,D): 1.71 CM2
BH CV ECHO MEAS - EDV(CUBED): 97.3 ML
BH CV ECHO MEAS - EDV(MOD-SP2): 73 ML
BH CV ECHO MEAS - EDV(MOD-SP4): 62 ML
BH CV ECHO MEAS - EF(MOD-SP2): 61.6 %
BH CV ECHO MEAS - EF(MOD-SP4): 58.1 %
BH CV ECHO MEAS - ESV(CUBED): 30.8 ML
BH CV ECHO MEAS - ESV(MOD-SP2): 28 ML
BH CV ECHO MEAS - ESV(MOD-SP4): 26 ML
BH CV ECHO MEAS - FS: 31.9 %
BH CV ECHO MEAS - IVS/LVPW: 0.73 CM
BH CV ECHO MEAS - IVSD: 0.8 CM
BH CV ECHO MEAS - LAT PEAK E' VEL: 8.3 CM/SEC
BH CV ECHO MEAS - LV DIASTOLIC VOL/BSA (35-75): 31.9 CM2
BH CV ECHO MEAS - LV MASS(C)D: 148.1 GRAMS
BH CV ECHO MEAS - LV MAX PG: 4.4 MMHG
BH CV ECHO MEAS - LV MEAN PG: 2 MMHG
BH CV ECHO MEAS - LV SYSTOLIC VOL/BSA (12-30): 13.4 CM2
BH CV ECHO MEAS - LV V1 MAX: 105 CM/SEC
BH CV ECHO MEAS - LV V1 VTI: 23.1 CM
BH CV ECHO MEAS - LVIDD: 4.6 CM
BH CV ECHO MEAS - LVIDS: 3.1 CM
BH CV ECHO MEAS - LVOT AREA: 3.1 CM2
BH CV ECHO MEAS - LVOT DIAM: 1.99 CM
BH CV ECHO MEAS - LVPWD: 1.1 CM
BH CV ECHO MEAS - MED PEAK E' VEL: 6.9 CM/SEC
BH CV ECHO MEAS - MR MAX PG: 24.4 MMHG
BH CV ECHO MEAS - MR MAX VEL: 247 CM/SEC
BH CV ECHO MEAS - MV A DUR: 0.09 SEC
BH CV ECHO MEAS - MV A MAX VEL: 77.7 CM/SEC
BH CV ECHO MEAS - MV DEC SLOPE: 711.2 CM/SEC2
BH CV ECHO MEAS - MV DEC TIME: 0.12 SEC
BH CV ECHO MEAS - MV E MAX VEL: 176 CM/SEC
BH CV ECHO MEAS - MV E/A: 2.27
BH CV ECHO MEAS - MV MAX PG: 12.1 MMHG
BH CV ECHO MEAS - MV MEAN PG: 3.8 MMHG
BH CV ECHO MEAS - MV P1/2T: 68.2 MSEC
BH CV ECHO MEAS - MV V2 VTI: 39.2 CM
BH CV ECHO MEAS - MVA(P1/2T): 3.2 CM2
BH CV ECHO MEAS - MVA(VTI): 1.83 CM2
BH CV ECHO MEAS - PA ACC TIME: 0.09 SEC
BH CV ECHO MEAS - PA V2 MAX: 111.3 CM/SEC
BH CV ECHO MEAS - PULM A REVS DUR: 0.09 SEC
BH CV ECHO MEAS - PULM A REVS VEL: 20.3 CM/SEC
BH CV ECHO MEAS - PULM DIAS VEL: 41.5 CM/SEC
BH CV ECHO MEAS - PULM S/D: 0.56
BH CV ECHO MEAS - PULM SYS VEL: 23.2 CM/SEC
BH CV ECHO MEAS - QP/QS: 0.63
BH CV ECHO MEAS - RAP SYSTOLE: 8 MMHG
BH CV ECHO MEAS - RV MAX PG: 2.02 MMHG
BH CV ECHO MEAS - RV V1 MAX: 71.1 CM/SEC
BH CV ECHO MEAS - RV V1 VTI: 15.2 CM
BH CV ECHO MEAS - RVOT DIAM: 1.94 CM
BH CV ECHO MEAS - RVSP: 59.4 MMHG
BH CV ECHO MEAS - SUP REN AO DIAM: 2.3 CM
BH CV ECHO MEAS - SV(LVOT): 71.6 ML
BH CV ECHO MEAS - SV(MOD-SP2): 45 ML
BH CV ECHO MEAS - SV(MOD-SP4): 36 ML
BH CV ECHO MEAS - SV(RVOT): 45 ML
BH CV ECHO MEAS - SVI(LVOT): 36.9 ML/M2
BH CV ECHO MEAS - SVI(MOD-SP2): 23.2 ML/M2
BH CV ECHO MEAS - SVI(MOD-SP4): 18.5 ML/M2
BH CV ECHO MEAS - TAPSE (>1.6): 1.88 CM
BH CV ECHO MEAS - TR MAX PG: 51.4 MMHG
BH CV ECHO MEAS - TR MAX VEL: 358.3 CM/SEC
BH CV ECHO MEASUREMENTS AVERAGE E/E' RATIO: 23.16
BH CV XLRA - RV BASE: 2.8 CM
BH CV XLRA - RV LENGTH: 8.4 CM
BH CV XLRA - RV MID: 2.7 CM
BH CV XLRA - TDI S': 12.2 CM/SEC
LEFT ATRIUM VOLUME INDEX: 28.4 ML/M2
LV EF BIPLANE MOD: 59.3 %
SINUS: 3.1 CM
STJ: 3 CM

## 2025-03-25 PROCEDURE — 93306 TTE W/DOPPLER COMPLETE: CPT

## 2025-03-25 PROCEDURE — 93306 TTE W/DOPPLER COMPLETE: CPT | Performed by: INTERNAL MEDICINE

## 2025-03-28 ENCOUNTER — TELEPHONE (OUTPATIENT)
Dept: CARDIOLOGY | Age: 85
End: 2025-03-28

## 2025-03-28 NOTE — TELEPHONE ENCOUNTER
Caller: Tamara Singh    Relationship to patient: Self    Best call back number: [phone]    Chief complaint: NA    Type of visit: 4-6 WEEK FU    Requested date: 04.25.25-05.09.25     If rescheduling, when is the original appointment: 04.01.25     Additional notes:PATIENT NEEDS 4-6 FU FOR NEW MEDICATION PER DR SUTTON PATIENT WAS TOLD SOMEONE WOULD FU TO SCHEDULE. PATIENT HAS TO HAVE MON-WED APPOINTMENT BECAUSE OF TRANSPORTATION. PLEASE CALL AND ADVISE.

## 2025-04-01 NOTE — TELEPHONE ENCOUNTER
Caller: Tamara Singh    Relationship to patient: Self    Best call back number: [phone]    Patient is needing: PATIENT WAS SCHEDULED BY [facility] 04.29.25 @2:15 PM WITH DR. SUTTON FOR 4 TO 6 WEEK F/U APPOINTMENT. IF PATIENT NEEDS DIFFERENT APPOINTMENT PLEASE CONTACT PATIENT TO RESCHEDULE. THANK YOU.

## 2025-04-03 ENCOUNTER — TELEPHONE (OUTPATIENT)
Dept: CARDIOLOGY | Age: 85
End: 2025-04-03

## 2025-04-03 NOTE — TELEPHONE ENCOUNTER
Caller: Tamara Singh    Relationship: Self    Best call back number: [phone]    What is the best time to reach you: ANY    Who are you requesting to speak with (clinical staff, provider,  specific staff member): ANY        What was the call regarding: PATIENT STATED THAT SHE WOULD LIKE PAPER COPIES OF ECHO AND HOLTER MONITOR RESULTS THAT WERE DONE ON 03.25.25 MAILED TO HER BECAUSE SHE CAN'T SEE THE RESULTS IN MYCHART. PLEASE CONTACT PATIENT TO ADVISE.THANK YOU.     Is it okay if the provider responds through [employer]: CALL

## 2025-04-07 ENCOUNTER — OFFICE VISIT (OUTPATIENT)
Dept: WOUND CARE | Facility: HOSPITAL | Age: 85
End: 2025-04-07
Payer: MEDICARE

## 2025-04-14 ENCOUNTER — OFFICE VISIT (OUTPATIENT)
Dept: WOUND CARE | Facility: HOSPITAL | Age: 85
End: 2025-04-14
Payer: MEDICARE

## 2025-04-16 ENCOUNTER — TRANSCRIBE ORDERS (OUTPATIENT)
Dept: PHYSICAL THERAPY | Facility: HOSPITAL | Age: 85
End: 2025-04-16
Payer: MEDICARE

## 2025-04-16 DIAGNOSIS — I89.0 LYMPHEDEMA: Primary | ICD-10-CM

## 2025-04-17 ENCOUNTER — TRANSCRIBE ORDERS (OUTPATIENT)
Dept: PHYSICAL THERAPY | Facility: HOSPITAL | Age: 85
End: 2025-04-17
Payer: MEDICARE

## 2025-04-17 DIAGNOSIS — I89.0 LYMPHEDEMA: Primary | ICD-10-CM

## 2025-04-29 ENCOUNTER — OFFICE VISIT (OUTPATIENT)
Dept: CARDIOLOGY | Age: 85
End: 2025-04-29
Payer: MEDICARE

## 2025-04-29 VITALS
HEART RATE: 87 BPM | DIASTOLIC BLOOD PRESSURE: 92 MMHG | BODY MASS INDEX: 37.17 KG/M2 | HEIGHT: 62 IN | WEIGHT: 202 LBS | SYSTOLIC BLOOD PRESSURE: 154 MMHG | OXYGEN SATURATION: 99 %

## 2025-04-29 DIAGNOSIS — I48.21 PERMANENT ATRIAL FIBRILLATION: Primary | ICD-10-CM

## 2025-04-29 DIAGNOSIS — G47.33 OBSTRUCTIVE SLEEP APNEA SYNDROME: ICD-10-CM

## 2025-04-29 DIAGNOSIS — I50.32 CHRONIC HEART FAILURE WITH PRESERVED EJECTION FRACTION (HFPEF): ICD-10-CM

## 2025-04-29 DIAGNOSIS — I89.0 LYMPHEDEMA: ICD-10-CM

## 2025-04-29 PROCEDURE — G2211 COMPLEX E/M VISIT ADD ON: HCPCS | Performed by: INTERNAL MEDICINE

## 2025-04-29 PROCEDURE — 1159F MED LIST DOCD IN RCRD: CPT | Performed by: INTERNAL MEDICINE

## 2025-04-29 PROCEDURE — 1160F RVW MEDS BY RX/DR IN RCRD: CPT | Performed by: INTERNAL MEDICINE

## 2025-04-29 PROCEDURE — 3080F DIAST BP >= 90 MM HG: CPT | Performed by: INTERNAL MEDICINE

## 2025-04-29 PROCEDURE — 99214 OFFICE O/P EST MOD 30 MIN: CPT | Performed by: INTERNAL MEDICINE

## 2025-04-29 PROCEDURE — 3077F SYST BP >= 140 MM HG: CPT | Performed by: INTERNAL MEDICINE

## 2025-04-29 NOTE — PROGRESS NOTES
"    Subjective:     Encounter Date:04/29/25      Patient ID: Tamara Singh is a 85 y.o. female.    Chief Complaint:  History of Present Illness    Dear Dr. Sauceda,    I had the pleasure of seeing this patient in the office today for persistent atrial fibrillation.  Chronic anticoagulation.  She also has a history of obstructive sleep apnea, not currently compliant with CPAP.  She has a history of pulmonary hypertension.    Comes in today follow-up after recent echocardiogram.  This showed progression/worsening of her pulmonary hypertension.  I started empagliflozin for potential component of HFpEF along with her obstructive sleep apnea and pulmonary hypertension.  She says she is actually doing better with this.  She is lost about 6 pounds, her breathing is improved.    The following portions of the patient's history were reviewed and updated as appropriate: allergies, current medications, past family history, past medical history, past social history, past surgical history and problem list.    Procedures       Objective:     Vitals:    04/29/25 1406   BP: 154/92   BP Location: Left arm   Patient Position: Sitting   Cuff Size: Adult   Pulse: 87   SpO2: 99%   Weight: 91.6 kg (202 lb)   Height: 157.5 cm (62\")     Body mass index is 36.95 kg/m².      Vitals reviewed.   Constitutional:       General: Not in acute distress.     Appearance: Well-developed. Not diaphoretic.   Eyes:      General:         Right eye: No discharge.         Left eye: No discharge.      Conjunctiva/sclera: Conjunctivae normal.      Pupils: Pupils are equal, round, and reactive to light.   HENT:      Head: Normocephalic and atraumatic.      Nose: Nose normal.   Neck:      Thyroid: No thyromegaly.      Trachea: No tracheal deviation.      Lymphadenopathy: No cervical adenopathy.   Pulmonary:      Effort: Pulmonary effort is normal. No respiratory distress.      Breath sounds: Normal breath sounds. No stridor.   Chest:      Chest wall: Not tender " "to palpatation.   Cardiovascular:      Normal rate. Irregular rhythm.      Murmurs: There is no murmur.      . No S3 gallop. No click. No rub.   Pulses:     Intact distal pulses.   Edema:     Peripheral edema present.  Abdominal:      General: Bowel sounds are normal. There is no distension.      Palpations: Abdomen is soft. There is no abdominal mass.      Tenderness: There is no abdominal tenderness. There is no guarding or rebound.   Musculoskeletal: Normal range of motion.         General: No tenderness or deformity.      Cervical back: Normal range of motion and neck supple. Skin:     General: Skin is warm and dry.      Findings: No erythema or rash.   Neurological:      Mental Status: Alert and oriented to person, place, and time.      Deep Tendon Reflexes: Reflexes are normal and symmetric.   Psychiatric:         Thought Content: Thought content normal.         Data and records reviewed:     Lab Results   Component Value Date    GLUCOSE 105 (H) 03/11/2025    BUN 24 (H) 03/11/2025    CREATININE 0.77 03/11/2025    EGFRIFNONA 77 11/24/2021    EGFRIFAFRI 88 11/24/2021    BCR 31.2 (H) 03/11/2025    K 3.9 03/11/2025    CO2 25.0 03/11/2025    CALCIUM 10.1 03/11/2025    ALBUMIN 4.4 03/11/2025    AST 31 03/11/2025    ALT 19 03/11/2025     No results found for: \"CHOL\"  Lab Results   Component Value Date    TRIG 60 09/16/2022     Lab Results   Component Value Date     (H) 09/16/2022     Lab Results   Component Value Date    LDL 43 09/16/2022     Lab Results   Component Value Date    VLDL 11 09/16/2022     No results found for: \"LDLHDL\"  CBC          3/11/2025    15:36 3/17/2025    10:34   CBC   WBC 8.43  8.26    RBC 5.64  5.78    Hemoglobin 15.1  15.1    Hematocrit 46.9  48.8    MCV 83.2  84.4    MCH 26.8  26.1    MCHC 32.2  30.9    RDW 15.4  15.7    Platelets 664  702        No radiology results for the last 90 days.    Results for orders placed during the hospital encounter of 03/25/25    Adult Transthoracic " Echo Complete W/ Cont if Necessary Per Protocol    Interpretation Summary    Left ventricular systolic function is normal. Calculated left ventricular EF = 59.3%    Left ventricular diastolic function is consistent with (grade III w/high LAP) reversible restrictive pattern.    Moderate tricuspid valve regurgitation is present.    Calculated right ventricular systolic pressure from tricuspid regurgitation is 59 mmHg.    Severe pulmonary hypertension is present.          Assessment:          Diagnosis Plan   1. Permanent atrial fibrillation        2. Chronic heart failure with preserved ejection fraction (HFpEF)        3. Obstructive sleep apnea syndrome        4. Lymphedema                   Plan:       1.  Permanent atrial fibrillation-cont Xarelto, rate appropriate with no rate control, consistent with sick sinus syndrome.  Previously seen by Dr. Lawton in the hospital, no indication for pacemaker. Feels generally bad, will get labs today, check holter, check echo  2.  Obstructive sleep apnea, not on CPAP, no recent evaluation, we referred to sleep medicine, not yet been in to be seen  3.  Chronic lymphedema- recheck labs  4.  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 Sy   5.  HFpEF-evidence of concurrent HFpEF now, I started her on Jardiance, much improved, continue same.  6.  We discussed longitudinal aspects of care for her permanent atrial fibrillation.    Thank you very much for allowing us to participate in the care of this pleasant patient.  Please don't hesitate to call if I can be of assistance in any way.      Current Outpatient Medications:     Cyanocobalamin 1000 MCG/ML kit, Inject B12 1000 mcg subQ once weekly x 4 doses, then monthly x 4 doses, Disp: 1 kit, Rfl: 0    empagliflozin (JARDIANCE) 10 MG tablet tablet, Take 1 tablet by mouth Daily., Disp: 90 tablet, Rfl: 3    ketorolac (ACULAR) 0.5 % ophthalmic solution, INSTILL 1 DROP INTO LEFT EYE 4 TIMES A DAY, Disp: , Rfl: 3    Lasix 40 MG tablet, Take 1  "tablet by mouth 2 (Two) Times a Day., Disp: 180 tablet, Rfl: 3    multivitamin with minerals tablet tablet, Take 1 tablet by mouth 2 (Two) Times a Day., Disp: , Rfl:     rivaroxaban (Xarelto) 20 MG tablet, Take 1 tablet by mouth Daily., Disp: 14 tablet, Rfl: 0    Syringe/Needle, Disp, 25G X 1\" 3 ML misc, Use with B12 injections, Disp: 100 each, Rfl: 0    valsartan (DIOVAN) 160 MG tablet, Take 1 tablet by mouth Every Morning., Disp: 90 tablet, Rfl: 3    VITAMIN D PO, Take  by mouth Daily., Disp: , Rfl:     atenolol (TENORMIN) 25 MG tablet, Take 1 tablet by mouth 2 (Two) Times a Day. (Patient not taking: Reported on 4/29/2025), Disp: 180 tablet, Rfl: 0         Return in about 6 months (around 10/29/2025).    "

## 2025-05-05 ENCOUNTER — TELEPHONE (OUTPATIENT)
Dept: ONCOLOGY | Facility: CLINIC | Age: 85
End: 2025-05-05
Payer: MEDICARE

## 2025-05-05 ENCOUNTER — OFFICE VISIT (OUTPATIENT)
Dept: INTERNAL MEDICINE | Age: 85
End: 2025-05-05
Payer: MEDICARE

## 2025-05-05 VITALS
BODY MASS INDEX: 37.87 KG/M2 | SYSTOLIC BLOOD PRESSURE: 134 MMHG | HEIGHT: 62 IN | TEMPERATURE: 96.7 F | OXYGEN SATURATION: 95 % | HEART RATE: 74 BPM | DIASTOLIC BLOOD PRESSURE: 82 MMHG | WEIGHT: 205.8 LBS

## 2025-05-05 DIAGNOSIS — E53.8 VITAMIN B12 DEFICIENCY: ICD-10-CM

## 2025-05-05 DIAGNOSIS — G47.9 SLEEP DISTURBANCE: ICD-10-CM

## 2025-05-05 DIAGNOSIS — R73.09 ELEVATED GLUCOSE: ICD-10-CM

## 2025-05-05 DIAGNOSIS — R71.8 ELEVATED HEMATOCRIT: ICD-10-CM

## 2025-05-05 DIAGNOSIS — Z71.89 ADVANCED CARE PLANNING/COUNSELING DISCUSSION: ICD-10-CM

## 2025-05-05 DIAGNOSIS — Z00.00 ENCOUNTER FOR ANNUAL WELLNESS VISIT (AWV) IN MEDICARE PATIENT: Primary | ICD-10-CM

## 2025-05-05 DIAGNOSIS — I27.20 PULMONARY HYPERTENSION: ICD-10-CM

## 2025-05-05 DIAGNOSIS — E55.9 VITAMIN D DEFICIENCY: ICD-10-CM

## 2025-05-05 DIAGNOSIS — E78.5 HYPERLIPIDEMIA, UNSPECIFIED HYPERLIPIDEMIA TYPE: ICD-10-CM

## 2025-05-05 DIAGNOSIS — I10 PRIMARY HYPERTENSION: ICD-10-CM

## 2025-05-05 LAB
25(OH)D3+25(OH)D2 SERPL-MCNC: 38.8 NG/ML (ref 30–100)
ALBUMIN SERPL-MCNC: 4.6 G/DL (ref 3.5–5.2)
ALBUMIN/GLOB SERPL: 1.3 G/DL
ALP SERPL-CCNC: 86 U/L (ref 39–117)
ALT SERPL-CCNC: 22 U/L (ref 1–33)
AST SERPL-CCNC: 38 U/L (ref 1–32)
BASOPHILS # BLD AUTO: 0.05 10*3/MM3 (ref 0–0.2)
BASOPHILS NFR BLD AUTO: 0.5 % (ref 0–1.5)
BILIRUB SERPL-MCNC: 0.7 MG/DL (ref 0–1.2)
BUN SERPL-MCNC: 25 MG/DL (ref 8–23)
BUN/CREAT SERPL: 27.8 (ref 7–25)
CALCIUM SERPL-MCNC: 10.4 MG/DL (ref 8.6–10.5)
CHLORIDE SERPL-SCNC: 100 MMOL/L (ref 98–107)
CHOLEST SERPL-MCNC: 176 MG/DL (ref 0–200)
CHOLEST/HDLC SERPL: 1.81 {RATIO}
CO2 SERPL-SCNC: 26.9 MMOL/L (ref 22–29)
CREAT SERPL-MCNC: 0.9 MG/DL (ref 0.57–1)
EGFRCR SERPLBLD CKD-EPI 2021: 63.2 ML/MIN/1.73
EOSINOPHIL # BLD AUTO: 0.14 10*3/MM3 (ref 0–0.4)
EOSINOPHIL NFR BLD AUTO: 1.5 % (ref 0.3–6.2)
ERYTHROCYTE [DISTWIDTH] IN BLOOD BY AUTOMATED COUNT: 15.7 % (ref 12.3–15.4)
GLOBULIN SER CALC-MCNC: 3.6 GM/DL
GLUCOSE SERPL-MCNC: 73 MG/DL (ref 65–99)
HBA1C MFR BLD: 5.8 % (ref 4.8–5.6)
HCT VFR BLD AUTO: 48.7 % (ref 34–46.6)
HDLC SERPL-MCNC: 97 MG/DL (ref 40–60)
HGB BLD-MCNC: 15.6 G/DL (ref 12–15.9)
IMM GRANULOCYTES # BLD AUTO: 0.04 10*3/MM3 (ref 0–0.05)
IMM GRANULOCYTES NFR BLD AUTO: 0.4 % (ref 0–0.5)
LDLC SERPL CALC-MCNC: 63 MG/DL (ref 0–100)
LYMPHOCYTES # BLD AUTO: 1.73 10*3/MM3 (ref 0.7–3.1)
LYMPHOCYTES NFR BLD AUTO: 18.6 % (ref 19.6–45.3)
MCH RBC QN AUTO: 26 PG (ref 26.6–33)
MCHC RBC AUTO-ENTMCNC: 32 G/DL (ref 31.5–35.7)
MCV RBC AUTO: 81.2 FL (ref 79–97)
MONOCYTES # BLD AUTO: 0.64 10*3/MM3 (ref 0.1–0.9)
MONOCYTES NFR BLD AUTO: 6.9 % (ref 5–12)
NEUTROPHILS # BLD AUTO: 6.7 10*3/MM3 (ref 1.7–7)
NEUTROPHILS NFR BLD AUTO: 72.1 % (ref 42.7–76)
NRBC BLD AUTO-RTO: 0 /100 WBC (ref 0–0.2)
PLATELET # BLD AUTO: 746 10*3/MM3 (ref 140–450)
POTASSIUM SERPL-SCNC: 5.1 MMOL/L (ref 3.5–5.2)
PROT SERPL-MCNC: 8.2 G/DL (ref 6–8.5)
RBC # BLD AUTO: 6 10*6/MM3 (ref 3.77–5.28)
SODIUM SERPL-SCNC: 139 MMOL/L (ref 136–145)
TRIGL SERPL-MCNC: 93 MG/DL (ref 0–150)
UNABLE TO VOID: NORMAL
VIT B12 SERPL-MCNC: 438 PG/ML (ref 211–946)
VLDLC SERPL CALC-MCNC: 16 MG/DL (ref 5–40)
WBC # BLD AUTO: 9.3 10*3/MM3 (ref 3.4–10.8)

## 2025-05-05 NOTE — PROGRESS NOTES
"    I N T E R N A L  M E D I C I ASH E    YISEL Doheryt      ENCOUNTER DATE:  05/05/2025    Tamara Singh / 85 y.o. / female      MEDICARE ANNUAL WELLNESS VISIT       Chief Complaint:    Chief Complaint   Patient presents with    Medicare Wellness-subsequent         Patient's general assessment of her health since a year ago:     - Compared to one year ago, she feels her physical health is:   STABLE/SAME    - Compared to one year ago, she feels her mental health is:  STABLE/SAME    Recent Hospitalization (within past 365 days) (NO unless indicated)  No      Patient Care Team:    Patient Care Team:  Yane Lama APRN as PCP - General (Family Medicine)  Evens Miller III, MD as Cardiologist (Cardiology)  Lloyd Hakns MD as Consulting Physician (Hematology and Oncology)  Varun Walker APRN as Nurse Practitioner (Thoracic Surgery)    Allergies:  Penicillins, Moxifloxacin, and Paco-bacit-poly-lidocaine    Medications:  Current Outpatient Medications on File Prior to Visit   Medication Sig Dispense Refill    Cyanocobalamin 1000 MCG/ML kit Inject B12 1000 mcg subQ once weekly x 4 doses, then monthly x 4 doses 1 kit 0    empagliflozin (JARDIANCE) 10 MG tablet tablet Take 1 tablet by mouth Daily. 90 tablet 3    Lasix 40 MG tablet Take 1 tablet by mouth 2 (Two) Times a Day. 180 tablet 3    multivitamin with minerals tablet tablet Take 1 tablet by mouth 2 (Two) Times a Day.      rivaroxaban (Xarelto) 20 MG tablet Take 1 tablet by mouth Daily. 14 tablet 0    Syringe/Needle, Disp, 25G X 1\" 3 ML misc Use with B12 injections 100 each 0    valsartan (DIOVAN) 160 MG tablet Take 1 tablet by mouth Every Morning. 90 tablet 3    VITAMIN D PO Take  by mouth Daily.      [DISCONTINUED] atenolol (TENORMIN) 25 MG tablet Take 1 tablet by mouth 2 (Two) Times a Day. (Patient not taking: Reported on 5/5/2025) 180 tablet 0    [DISCONTINUED] ketorolac (ACULAR) 0.5 % ophthalmic solution INSTILL 1 DROP INTO LEFT EYE 4 TIMES A DAY " (Patient not taking: Reported on 5/5/2025)  3     No current facility-administered medications on file prior to visit.          No opioid medication identified on active medication list. I have reviewed chart for other potential  high risk medication/s and harmful drug interactions in the elderly.       No opioid listed on medication list       HPI for other active medical problems:     Last seen March 2025.     Accompanied by her sister in law to today's visit.     Saw cardiology, Dr. Miller, on April 29, 2025 for Afib with pulmonary HTN.  Next appointment is with cardiology, Lori Rojas, on October 29, 2025.  Followed by thoraci surgery, YISEL Munoz with next appointment on October 2, 2025.  March 2025 Holter Monitor showed atrial fibrillation throughout.  March 2025 ECHO with EF of 59.3%, moderate tricuspid valve regurgitation, severe pulmonary hypertension.  March 2025 labs with elevated hematocrit 48.8, normal hemoglobin 15.1, microcytosis.  Scheduled to establish with hematology, Dr. Aasems Jacob on Miladis 10, 2025.  Has not yet scheduled with sleep medicine.  Has not yet returned stool kit but states she plans to do so.  BP is well controlled on empagliflozin 10 mg daily, valsartan 160 mg daily, atenolol 25 mg BID, furosemide 40 mg BID, rivaroxaban 20 mg daily.    March 2025 Vitamin B12 deficiency, 246.  She has started Vitamin B12 injections.  Reports reduced fatigue.         HISTORY     PFSH:     The following portions of the patient's history were reviewed and updated as appropriate: Allergies / Current Medications / Past Medical History / Surgical History / Social History / Family History    Problem List:  Patient Active Problem List   Diagnosis    Lumbar radiculopathy    Anxiety    Secondary polycythemia    Arteriovenous malformation    Venous stasis dermatitis    Eczema    Hypertension    Hypoglycemia    Knee pain    Low back pain    Lymphedema    Obstructive sleep apnea syndrome    Pain in  thoracic spine    Varicose veins of lower extremity with inflammation    Cobalamin deficiency    Vitamin D deficiency    Muscle spasms of both lower extremities    Generalized osteoarthritis    Pulmonary hypertension    High risk medication use    Atrial fibrillation    Symptomatic bradycardia    Obesity (BMI 30-39.9)    Chronic heart failure with preserved ejection fraction (HFpEF)       Past Medical History:  Past Medical History:   Diagnosis Date    Abnormal ECG 06/05/2021    Anxiety     Arthritis     Asthma     Used to use inhaler but dont anymore.    Atrial fibrillation     Cancer     Superficial on face    Cholelithiasis     Coronary artery disease     Hypertension     Legally blind     Low back pain     Macular degeneration     Obesity     Pulmonary hypertension     Sleep apnea     Urinary tract infection     Visual impairment     macular degeneration       Past Surgical History:  Past Surgical History:   Procedure Laterality Date    CHOLECYSTECTOMY  1974       Social History:  Social History     Socioeconomic History    Marital status: Single   Tobacco Use    Smoking status: Never     Passive exposure: Never    Smokeless tobacco: Never   Vaping Use    Vaping status: Never Used   Substance and Sexual Activity    Alcohol use: Never    Drug use: Never     Types: Hydrocodone     Comment: Pt is prescribe hydrocodone    Sexual activity: Never       Family History:  Family History   Problem Relation Age of Onset    Heart attack Mother 86    Stroke Mother     Heart disease Mother 80    Hypertension Mother     Heart failure Mother     Arthritis Mother     Hyperlipidemia Mother     Heart attack Brother 70    Asthma Brother     Hyperlipidemia Brother     Thyroid disease Brother     Arthritis Brother     Heart failure Father     Asthma Father     Kidney disease Sister         Stage 5 on dialysis    Diabetes Maternal Grandmother     Asthma Brother     Thyroid disease Brother     Cancer Sister     Diabetes Sister      "Kidney disease Sister     Vision loss Maternal Uncle     Hyperlipidemia Brother     Thyroid disease Brother     Arthritis Brother     Cancer Sister         breast cancer    Kidney disease Sister     Kidney disease Sister         Stage 5 on dialysis         PATIENT ASSESSMENT     Vitals:  Vitals:    05/05/25 0937   BP: 134/82   Pulse: 74   Temp: 96.7 °F (35.9 °C)   SpO2: 95%   Weight: 93.4 kg (205 lb 12.8 oz)   Height: 157.5 cm (62\")       BP Readings from Last 3 Encounters:   05/05/25 134/82   04/29/25 154/92   03/25/25 130/70     Wt Readings from Last 3 Encounters:   05/05/25 93.4 kg (205 lb 12.8 oz)   04/29/25 91.6 kg (202 lb)   03/25/25 94.3 kg (208 lb)      Body mass index is 37.64 kg/m².    [unfilled]        Review of Systems:    Review of Systems   Constitutional:  Positive for fatigue (Improving). Negative for chills, fever and unexpected weight change.   Respiratory:  Negative for cough, chest tightness and shortness of breath.    Cardiovascular:  Negative for chest pain, palpitations and leg swelling.   Neurological:  Negative for dizziness, weakness, light-headedness and headaches.   Psychiatric/Behavioral:  Positive for sleep disturbance. The patient is not nervous/anxious.        Physical Exam:    Physical Exam  Vitals reviewed.   Constitutional:       General: She is not in acute distress.     Appearance: Normal appearance. She is not ill-appearing, toxic-appearing or diaphoretic.   HENT:      Head: Normocephalic and atraumatic.      Right Ear: Tympanic membrane, ear canal and external ear normal. There is no impacted cerumen.      Left Ear: Tympanic membrane, ear canal and external ear normal. There is no impacted cerumen.      Nose: Nose normal. No congestion or rhinorrhea.      Mouth/Throat:      Mouth: Mucous membranes are moist.      Pharynx: Oropharynx is clear. No oropharyngeal exudate or posterior oropharyngeal erythema.   Eyes:      Extraocular Movements: Extraocular movements intact.      " Conjunctiva/sclera: Conjunctivae normal.      Pupils: Pupils are equal, round, and reactive to light.   Cardiovascular:      Rate and Rhythm: Normal rate and regular rhythm.      Heart sounds: Normal heart sounds.   Pulmonary:      Effort: Pulmonary effort is normal. No respiratory distress.      Breath sounds: Normal breath sounds.   Abdominal:      General: Bowel sounds are normal.      Palpations: Abdomen is soft.      Tenderness: There is no abdominal tenderness.   Musculoskeletal:         General: Normal range of motion.      Cervical back: Normal range of motion and neck supple.      Right lower leg: Edema (Non pitting) present.      Left lower leg: Edema (Non pitting) present.   Lymphadenopathy:      Cervical: No cervical adenopathy.   Skin:     General: Skin is warm and dry.   Neurological:      General: No focal deficit present.      Mental Status: She is alert and oriented to person, place, and time. Mental status is at baseline.   Psychiatric:         Mood and Affect: Mood normal.         Behavior: Behavior normal.         Thought Content: Thought content normal.         Judgment: Judgment normal.         Reviewed Data:    Labs:   Lab Results   Component Value Date     03/11/2025    K 3.9 03/11/2025    CALCIUM 10.1 03/11/2025    AST 31 03/11/2025    ALT 19 03/11/2025    BUN 24 (H) 03/11/2025    CREATININE 0.77 03/11/2025    CREATININE 0.80 09/18/2024    CREATININE 0.78 04/01/2024    EGFRIFNONA 77 11/24/2021    EGFRIFAFRI 88 11/24/2021       Lab Results   Component Value Date    HGBA1C 4.80 09/16/2022       Lab Results   Component Value Date    LDL 43 09/16/2022     (H) 09/16/2022    TRIG 60 09/16/2022    CHOLHDLRATIO 1.39 09/16/2022       Lab Results   Component Value Date    TSH 3.240 03/11/2025    FREET4 1.08 03/03/2023          Lab Results   Component Value Date    WBC 8.26 03/17/2025    HGB 15.1 03/17/2025    HGB 15.1 03/11/2025    HGB 12.6 09/27/2023     (H) 03/17/2025              "    No results found for: \"PSA\"    Imaging:                Medical Tests:              Summary of old records / correspondence / consultant report:             Request outside records:           SCREENING ASSESSMENT      Screening for Glaucoma:  Previous screening for glaucoma?: Yes, cataract surgery completed in 2024    Hearing Loss Screen:  Finger Rub Hearing Test (right ear): Passed  Finger Rub Hearing Test (left ear): Passed    Urinary Incontinence Screen:  Episodes of urinary incontinence? : No    Depression Screen:      5/5/2025     9:51 AM   PHQ-2/PHQ-9 Depression Screening   Little interest or pleasure in doing things Not at all   Feeling down, depressed, or hopeless Not at all   How difficult have these problems made it for you to do your work, take care of things at home, or get along with other people? Not difficult at all        PHQ-2: 0 (Not depressed)    PHQ-9: 0 (Negative screening for depression)         FUNCTIONAL, FALL RISK, & COGNITIVE SCREENING (components below):     A) Functional and cognitive status based on patient responses:        5/5/2025     9:45 AM   Functional & Cognitive Status   Do you have difficulty preparing food and eating? No   Do you have difficulty bathing yourself, getting dressed or grooming yourself? No   Do you have difficulty using the toilet? No   Do you have difficulty moving around from place to place? No   Do you have trouble with steps or getting out of a bed or a chair? Yes   Current Diet Well Balanced Diet   Dental Exam Up to date   Eye Exam Up to date   Exercise (times per week) 7 times per week   Current Exercises Include Walking   Do you need help using the phone?  No   Are you deaf or do you have serious difficulty hearing?  No   Do you need help to go to places out of walking distance? Yes   Do you need help shopping? Yes   Do you need help preparing meals?  No   Do you need help with housework?  Yes   Do you need help with laundry? Yes   Do you need help " taking your medications? No   Do you need help managing money? No   Do you ever drive or ride in a car without wearing a seat belt? No   Have you felt unusual stress, anger or loneliness in the last month? No   Who do you live with? Alone   If you need help, do you have trouble finding someone available to you? No   Do you have difficulty concentrating, remembering or making decisions? No       B) Assessment of Fall Risk:    Fall Risk Assessment was completed, and patient is at moderate risk for falls.    Need for further evaluation of gait, strength, and balance? : NO    Timed Up and Go (TUG): 12 seconds   (>= 12 seconds indicates high risk for falling)    Observable abnormalities included:   using assistive device properly (cane)    C. Assessment of Cognitive Function:    Mini-Cog Test:     1) Registration (3 objects): NO   2) Clock Draw: Passed? : No   3) Number of objects recalled: 1 (MA)     Further evaluation required? : Mild age related change noted without evidence of significant cognitive impairment    **OVERALL ASSESSMENT OF FUNCTIONAL ABILITY**  (Assessment of ability to perform ADL's (showering/bathing, using toilet, dressing, feeding self, moving self around) and IADL's (use telephone, shop, prepare food, housekeep, do laundry, transport independently, take medications independently, and handle finances)    DEGREE OF FUNCTIONAL IMPAIRMENT:   NONE (based on assessment noted above)    ABILITY TO LIVE INDEPENDENTLY:    Capable of living independently       COUNSELING       A. Identification of Health Risk Factors:    Risk factors include: cardiovascular risk factors  obesity      B. Age-Appropriate Screening Schedule:  (Refer to the list below for future screening recommendations based on patient's age, sex and/or medical conditions. Orders for these recommended tests are listed in the plan section. The patient has been provided with a written plan)    Health Maintenance Topics  Health Maintenance   Topic  Date Due    DXA SCAN  Never done    COLORECTAL CANCER SCREENING  02/24/2023    LIPID PANEL  09/16/2023    Pneumococcal Vaccine 50+ (2 of 2 - PCV) 11/01/2025 (Originally 1/13/2021)    TDAP/TD VACCINES (1 - Tdap) 11/01/2025 (Originally 1/26/1959)    ZOSTER VACCINE (1 of 2) 11/01/2025 (Originally 1/26/1991)    COVID-19 Vaccine (3 - 2024-25 season) 11/05/2025 (Originally 9/1/2024)    RSV Vaccine - Adults (1 - 1-dose 75+ series) 05/05/2026 (Originally 1/26/2016)    INFLUENZA VACCINE  07/01/2025    ANNUAL WELLNESS VISIT  05/05/2026       Health Maintenance Topics Due or Over-Due  Health Maintenance Due   Topic Date Due    DXA SCAN  Never done    COLORECTAL CANCER SCREENING  02/24/2023    LIPID PANEL  09/16/2023         C. Advanced Care Planning:    Advance Care Planning   ACP discussion was held with the patient during this visit. Patient does not have an advance directive, information provided.       D. Patient Self-Management and Personalized Health Advice:    She has been provided with PERSONALIZED COUNSELING/INFORMATION (AVS educational information) about:     -- reducing risk for cardiac/vascular events with pre-existing disease  evidence of cognitive problems and need for ongoing surveillance for progressive changes  designing advance directives      She has been recommended for the following PREVENTATIVE SERVICES which has been performed today, will be ordered today or ordered/performed on upcoming follow-up visit:     LIFESTYLE PREVENTATIVE MEASURES   EYE exam for GLAUCOMA screening recommended annually (or follow-up regularly with eye care specialist for active glaucoma history)  CARDIOVASCULAR disease risk reduction counseling performed  FALL RISK assessment / plan of care completed    CARDIOVASCULAR SCREENING   Has established history of CV disease    CANCER SCREENING   COLORECTAL cancer: screening discussed with patient; due to underlying medical conditions it was mutually decided that risk of screening  outweighs benefits; mutually decided on no further screening at this time   BREAST CANCER screening discussed but patient DEFERS further screening    MISC SCREENING  screening for osteoporosis deferred by patient  DIABETES screening performed (current/reviewed labs/lab ordered)    VACCINATION/IMMUNIZATION   RSV vaccination discussed/recommended (60+)  COVID-19 vaccination discussed/recommended  STREP PNEUMO vaccine administered/recommended/discussed (65+)  Tdap / Td administered/recommended/discussed   SHINGRIX administered/recommended/discussed (50+)      E. Miscellaneous Items: [id number]    Aspirin use counseling: Does not need ASA (and currently is not on it)    Discussed BMI with her. The BMI is above average; BMI management plan is completed (discussed plans for weight loss)    Reviewed use of high risk medication in the elderly: YES    Reviewed for potential of harmful drug interactions in the elderly: YES        WRAP UP       Assessment & Plan:    1) MEDICARE ANNUAL WELLNESS VISIT    2) OTHER MEDICAL CONDITIONS ADDRESSED TODAY:            Problem List Items Addressed This Visit       Hypertension    Relevant Medications    Lasix 40 MG tablet    valsartan (DIOVAN) 160 MG tablet    Other Relevant Orders    CBC & Differential    Urinalysis With Microscopic If Indicated (No Culture) - Urine, Clean Catch    Pulmonary hypertension    Relevant Medications    rivaroxaban (Xarelto) 20 MG tablet     Other Visit Diagnoses         Encounter for annual wellness visit (AWV) in Medicare patient    -  Primary      Elevated hematocrit        Relevant Orders    CBC & Differential      Elevated glucose        Relevant Orders    Comprehensive Metabolic Panel    Hemoglobin A1c      Hyperlipidemia, unspecified hyperlipidemia type        Relevant Orders    Comprehensive Metabolic Panel    Lipid Panel With / Chol / HDL Ratio      Sleep disturbance        Relevant Orders    Ambulatory Referral to Sleep Medicine      Advanced care  "planning/counseling discussion        Relevant Orders    Ambulatory Referral to Social Care Services (Amb Case Mgmt)      Vitamin B12 deficiency        Relevant Orders    Vitamin B12                      Orders Placed This Encounter   Procedures    Comprehensive Metabolic Panel    Hemoglobin A1c    Lipid Panel With / Chol / HDL Ratio    Urinalysis With Microscopic If Indicated (No Culture) - Urine, Clean Catch    Vitamin B12    Ambulatory Referral to Sleep Medicine    Ambulatory Referral to Social Care Services (Amb Case Mgmt)    CBC & Differential       Discussion / Summary:    Rule out sleep apnea given ongoing fatigue with severe pulmonary hypertension - referral placed.  Follow up with hematology as scheduled.  She will return stool kit.  She will consider updating vaccines through pharmacy.  Declines mammogram/ DEXA.    Medications as of TODAY:              Current Outpatient Medications   Medication Sig Dispense Refill    Cyanocobalamin 1000 MCG/ML kit Inject B12 1000 mcg subQ once weekly x 4 doses, then monthly x 4 doses 1 kit 0    empagliflozin (JARDIANCE) 10 MG tablet tablet Take 1 tablet by mouth Daily. 90 tablet 3    Lasix 40 MG tablet Take 1 tablet by mouth 2 (Two) Times a Day. 180 tablet 3    multivitamin with minerals tablet tablet Take 1 tablet by mouth 2 (Two) Times a Day.      rivaroxaban (Xarelto) 20 MG tablet Take 1 tablet by mouth Daily. 14 tablet 0    Syringe/Needle, Disp, 25G X 1\" 3 ML misc Use with B12 injections 100 each 0    valsartan (DIOVAN) 160 MG tablet Take 1 tablet by mouth Every Morning. 90 tablet 3    VITAMIN D PO Take  by mouth Daily.       No current facility-administered medications for this visit.         FOLLOW-UP:            No follow-ups on file.              Future Appointments   Date Time Provider Department Center   6/10/2025  1:50 PM LAB CHAIR 1 [facility]  [facility]   6/10/2025  2:20 PM Jacob, Aasems, MD [facility]   10/2/2025  1:45 PM Catherine Christina, " [facility]   10/29/2025 11:00 AM Lori Rojas APRN [id number]           After Visit Summary (AVS) including the Personalized Prevention  Plan Services (PPPS) was either printed and given to the patient at check-out today and/or sent to [facility] for review.

## 2025-05-07 ENCOUNTER — REFERRAL TRIAGE (OUTPATIENT)
Age: 85
End: 2025-05-07
Payer: MEDICARE

## 2025-05-09 ENCOUNTER — PATIENT OUTREACH (OUTPATIENT)
Age: 85
End: 2025-05-09
Payer: MEDICARE

## 2025-05-09 NOTE — OUTREACH NOTE
Social Work Assessment  Questions/Answers      Flowsheet Row Most Recent Value   Referral Source physician, outpatient staff, outpatient clinic   Reason for Consult community resources, health care directive   Preferred Language English   Advance Care Planning Reviewed no concerns identified   Current Living Arrangements home   Potentially Unsafe Housing Conditions none   In the past 12 months has the electric, gas, oil, or water company threatened to shut off services in your home? No   Primary Care Provided by self   Employment Status retired   Source of Income unable to assess   Financial/Environmental Concerns none   Application for Public Assistance not applied   Spiritual, Cultural Beliefs, Hoahaoism Practices, Values that Affect Care no   Medications independent   Meal Preparation independent   Housekeeping assistive person   Laundry assistive person   Shopping assistive person   If for any reason you need help with day-to-day activities such as bathing, preparing meals, shopping, managing finances, etc., do you get the help you need? I get all the help I need   Feels Unsafe at Home or Work/School no   Feels Threatened by Someone no   Feels Unsafe at Home or Work/School no   Feels Threatened by Someone no   Major Change/Loss/Stressor none   Do you want help finding or keeping work or a job? I do not need or want help   Do you want help with school or training? For example, starting or completing job training or getting a high school diploma, GED or equivalent No   Transportation Concerns none          SDOH updated and reviewed with the patient during this program:  --     Disabilities: At Risk (5/9/2025)    Disabilities     Concentrating, Remembering, or Making Decisions Difficulty: no     Doing Errands Independently Difficulty: yes      --     Employment: Not At Risk (5/9/2025)    Employment     Do you want help finding or keeping work or a job?: I do not need or want help      Financial Resource Strain: Low  Risk  (5/9/2025)    Overall Financial Resource Strain (CARDIA)     Difficulty of Paying Living Expenses: Not very hard      --     Food Insecurity: No Food Insecurity (3/6/2023)    Hunger Vital Sign     Worried About Running Out of Food in the Last Year: Never true     Ran Out of Food in the Last Year: Never true      --     Health Literacy: Not At Risk (5/9/2025)    Education     Help with school or training?: No     Preferred Language: English      --     Housing Stability: Low Risk  (5/9/2025)    Housing Stability Vital Sign     Unable to Pay for Housing in the Last Year: No     Number of Times Moved in the Last Year: 0     Homeless in the Last Year: No      --     Interpersonal Safety: Unknown (5/9/2025)    Humiliation, Afraid, Rape, and Kick questionnaire     Fear of Current or Ex-Partner: No     Emotionally Abused: No      --     Stress: No Stress Concern Present (5/9/2025)    [employer] [place] [employer] - Occupational Stress Questionnaire     Feeling of Stress : Only a little      --     Transportation Needs: No Transportation Needs (5/9/2025)    PRAPARE - Transportation     Lack of Transportation (Medical): No     Lack of Transportation (Non-Medical): No      --     Utilities: Not At Risk (5/9/2025)    [facility] Utilities     Threatened with loss of utilities: No       Patient Outreach    MSW outreach to patient as requested per primary care provider referral to discuss advance care planning. MSW explained reason for call and discussed the purpose and role of advance care planning documents. Patient discussed that she already has a living will and power of  documents set up and does not need further assistance at this time. MSW encouraged patient to bring a copy of her advance care planning documents to her next appointment to be scanned into her medical record. Patient thanked MSW for the call and stated that no other community resources are needed at this time. Patient will bring her living  will and power of  documents to her next appointment.  MSW to close as patient requires no further assistance.     Hien ALLEN -   Ambulatory Case Management    5/9/2025, 12:22 EDT

## 2025-05-15 ENCOUNTER — TELEPHONE (OUTPATIENT)
Dept: CARDIOLOGY | Age: 85
End: 2025-05-15

## 2025-05-15 NOTE — TELEPHONE ENCOUNTER
Caller: Tamara Singh    Relationship to patient: Self    Best call back number: [phone]     Patient is needing: PT USES THE ELECTRICAL COMPRESSION BOOTS AND IS WONDERING IF TAKING HER BLOOD THINNER IS OK WITH THIS. PLS CALL TO ADVISE.

## 2025-05-29 ENCOUNTER — TELEPHONE (OUTPATIENT)
Dept: INTERNAL MEDICINE | Age: 85
End: 2025-05-29

## 2025-05-29 NOTE — TELEPHONE ENCOUNTER
Caller: Tamara Singh    Relationship to patient: Self    Best call back number: [phone]    Patient is needing: PATIENT DOES NOT WISH TO MOVE FORWARD WITH THE REFERRAL TO SLEEP MEDICINE

## 2025-06-03 ENCOUNTER — TELEPHONE (OUTPATIENT)
Dept: ONCOLOGY | Facility: CLINIC | Age: 85
End: 2025-06-03
Payer: MEDICARE

## 2025-06-03 NOTE — TELEPHONE ENCOUNTER
Caller: Tamara Singh    Relationship to patient: Self    Best call back number: [phone]    Chief complaint: PATIENT CALLED TO RESCHEDULE     Type of visit: LAB AND NEW    Requested date: 6-16-25 OR 6-17-25     If rescheduling, when is the original appointment: 6-10-25

## 2025-06-04 NOTE — TELEPHONE ENCOUNTER
Appt was made for 6/10 and pt wanted to r/s- we could not get pt back in until 7/29 and pt has confirmed

## 2025-06-09 ENCOUNTER — TELEPHONE (OUTPATIENT)
Dept: INTERNAL MEDICINE | Age: 85
End: 2025-06-09

## 2025-06-09 NOTE — TELEPHONE ENCOUNTER
Caller: Tamara Singh    Relationship to patient: Self    Patient is needing: PATIENT IS ASKING THAT [facility] DISREGARD THE REFERRAL TO THE [facility].   SHE STATES SHE DOES NOT WANT TO DO THAT ANY LONGER.

## 2025-06-11 DIAGNOSIS — E53.8 VITAMIN B12 DEFICIENCY: ICD-10-CM

## 2025-06-12 RX ORDER — CYANOCOBALAMIN 1000 UG/ML
INJECTION, SOLUTION INTRAMUSCULAR; SUBCUTANEOUS
Qty: 8 ML | Refills: 1 | Status: SHIPPED | OUTPATIENT
Start: 2025-06-12

## 2025-07-23 DIAGNOSIS — I27.20 PULMONARY HYPERTENSION, UNSPECIFIED: ICD-10-CM

## 2025-07-23 RX ORDER — FUROSEMIDE 40 MG/1
40 TABLET ORAL 2 TIMES DAILY
Qty: 180 TABLET | Refills: 3 | Status: SHIPPED | OUTPATIENT
Start: 2025-07-23

## 2025-07-29 ENCOUNTER — CONSULT (OUTPATIENT)
Dept: ONCOLOGY | Facility: CLINIC | Age: 85
End: 2025-07-29
Payer: MEDICARE

## 2025-07-29 ENCOUNTER — LAB (OUTPATIENT)
Dept: LAB | Facility: HOSPITAL | Age: 85
End: 2025-07-29
Payer: MEDICARE

## 2025-07-29 VITALS
HEIGHT: 62 IN | SYSTOLIC BLOOD PRESSURE: 163 MMHG | OXYGEN SATURATION: 98 % | HEART RATE: 64 BPM | WEIGHT: 194 LBS | DIASTOLIC BLOOD PRESSURE: 86 MMHG | BODY MASS INDEX: 35.7 KG/M2

## 2025-07-29 DIAGNOSIS — I48.21 PERMANENT ATRIAL FIBRILLATION: ICD-10-CM

## 2025-07-29 DIAGNOSIS — E53.8 COBALAMIN DEFICIENCY: ICD-10-CM

## 2025-07-29 DIAGNOSIS — E61.1 IRON DEFICIENCY: ICD-10-CM

## 2025-07-29 DIAGNOSIS — Z79.01 ANTICOAGULATION ADEQUATE: ICD-10-CM

## 2025-07-29 DIAGNOSIS — D75.839 THROMBOCYTOSIS: ICD-10-CM

## 2025-07-29 DIAGNOSIS — D75.1 ERYTHROCYTOSIS: Primary | ICD-10-CM

## 2025-07-29 DIAGNOSIS — D75.839 THROMBOCYTOSIS: Primary | ICD-10-CM

## 2025-07-29 LAB
BASOPHILS # BLD AUTO: 0.06 10*3/MM3 (ref 0–0.2)
BASOPHILS NFR BLD AUTO: 0.6 % (ref 0–1.5)
DEPRECATED RDW RBC AUTO: 49.8 FL (ref 37–54)
EOSINOPHIL # BLD AUTO: 0.23 10*3/MM3 (ref 0–0.4)
EOSINOPHIL NFR BLD AUTO: 2.3 % (ref 0.3–6.2)
ERYTHROCYTE [DISTWIDTH] IN BLOOD BY AUTOMATED COUNT: 18.4 % (ref 12.3–15.4)
HCT VFR BLD AUTO: 51.6 % (ref 34–46.6)
HGB BLD-MCNC: 15.9 G/DL (ref 12–15.9)
IMM GRANULOCYTES # BLD AUTO: 0.07 10*3/MM3 (ref 0–0.05)
IMM GRANULOCYTES NFR BLD AUTO: 0.7 % (ref 0–0.5)
LYMPHOCYTES # BLD AUTO: 3.37 10*3/MM3 (ref 0.7–3.1)
LYMPHOCYTES NFR BLD AUTO: 33.5 % (ref 19.6–45.3)
MCH RBC QN AUTO: 25 PG (ref 26.6–33)
MCHC RBC AUTO-ENTMCNC: 30.8 G/DL (ref 31.5–35.7)
MCV RBC AUTO: 81.3 FL (ref 79–97)
MONOCYTES # BLD AUTO: 0.84 10*3/MM3 (ref 0.1–0.9)
MONOCYTES NFR BLD AUTO: 8.3 % (ref 5–12)
NEUTROPHILS NFR BLD AUTO: 5.49 10*3/MM3 (ref 1.7–7)
NEUTROPHILS NFR BLD AUTO: 54.6 % (ref 42.7–76)
NRBC BLD AUTO-RTO: 0 /100 WBC (ref 0–0.2)
PLATELET # BLD AUTO: 368 10*3/MM3 (ref 140–450)
PMV BLD AUTO: 10.3 FL (ref 6–12)
RBC # BLD AUTO: 6.35 10*6/MM3 (ref 3.77–5.28)
WBC NRBC COR # BLD AUTO: 10.06 10*3/MM3 (ref 3.4–10.8)

## 2025-07-29 PROCEDURE — 3079F DIAST BP 80-89 MM HG: CPT | Performed by: INTERNAL MEDICINE

## 2025-07-29 PROCEDURE — 36415 COLL VENOUS BLD VENIPUNCTURE: CPT

## 2025-07-29 PROCEDURE — 99205 OFFICE O/P NEW HI 60 MIN: CPT | Performed by: INTERNAL MEDICINE

## 2025-07-29 PROCEDURE — 3077F SYST BP >= 140 MM HG: CPT | Performed by: INTERNAL MEDICINE

## 2025-07-29 PROCEDURE — 85025 COMPLETE CBC W/AUTO DIFF WBC: CPT

## 2025-07-29 PROCEDURE — 1126F AMNT PAIN NOTED NONE PRSNT: CPT | Performed by: INTERNAL MEDICINE

## 2025-07-29 NOTE — PROGRESS NOTES
REASON FOR CONSULTATION:     Provide an opinion on any further workup or treatment for thrombocytosis and elevated hematocrit with microcytosis.                             REQUESTING PHYSICIAN: YISEL Doherty    RECORDS OBTAINED:  Records of the patient's history including those obtained from the referring provider were reviewed and summarized in detail.    HISTORY OF PRESENT ILLNESS:  The patient is a 85 y.o. year old female who is here for the above-mentioned history.  History of Present Illness  The patient presented today 7/29/2025 for a new patient consultation due to thrombocytosis and elevated hematocrit with microcytosis. She had canceled multiple appointments in May, June, and July.  Patient is accompanied by her sister-in-law today.     She has been receiving monthly vitamin B12 injections since 03/2025 after initial weekly B12 injection, totaling nine injections so far. Initially, these were administered weekly. She reports improvement of her fatigue since the B12 injection.  She is not currently taking iron supplements and acknowledges that her diet lacks sufficient red meat. She reports no headaches or lightheadedness. She recalls taking iron pills in her youth.  She has no child.     She has never smoked but has been exposed to secondhand smoke for 40 years, leading to lung damage. She has pulmonary hypertension but does not require oxygen supplementation.    She is on Xarelto for atrial fibrillation and Jardiance for diabetes.    SOCIAL HISTORY  She has never smoked.         Results  Labs:   - 9/27/2023: Hemoglobin 12.6 hematocrit 40.2%, MCV 84, platelets 392,000, WBC 6300 neutrophils 3300 lymphocytes 2400.     - Complete Blood Count (CBC): 03/17/2025, Hemoglobin 15.1, Hematocrit 48.8%, MCV 84.4, RBC 5,780,000/mcL, Platelets 702,000, WBC 8260 including Neutrophils 4880   - Iron Studies: 03/18/2025, Ferritin 26.1, Free Iron 44, TIBC 498, Iron Saturation 9%, B12 level 246 pg/mL, Folate >  "20     - Complete Blood Count (CBC): 05/05/2025, Improved B12 level at 438.  Hemoglobin 15.6 hematocrit 48.7% RBC 6,000,000/mcL, MCV 81.2, platelets 746,000.     - Complete Blood Count (CBC): 07/29/2025, Hemoglobin 15.9, Hematocrit 51.6%, MCV 81.3, MCHC 30.8, RBC 6,350,000/mcL, Platelets 368,000, WBC 10,060 including Neutrophils 5490, Lymphocytes 3370, Monocytes 840, Immature Granulocytes 70, Eosinophil 230, Basophil 60.     Past Medical History:   Diagnosis Date    Abnormal ECG 06/05/2021    Anxiety     Arthritis     Asthma     Used to use inhaler but dont anymore.    Atrial fibrillation     Cancer     Superficial on face    Cholelithiasis     Coronary artery disease     Hypertension     Legally blind     Low back pain     Macular degeneration     Obesity     Pulmonary hypertension     Sleep apnea     Urinary tract infection     Visual impairment     macular degeneration     Past Surgical History:   Procedure Laterality Date    CHOLECYSTECTOMY  1974       MEDICATIONS    Current Outpatient Medications:     cyanocobalamin 1000 MCG/ML injection, INJECT B12 1000 MCG SUBCUTANEOUS ONCE WEEKLY X 4 DOSES, THEN ONCE MONTHLY X 4 DOSES, Disp: 8 mL, Rfl: 1    empagliflozin (JARDIANCE) 10 MG tablet tablet, Take 1 tablet by mouth Daily., Disp: 90 tablet, Rfl: 3    Lasix 40 MG tablet, TAKE 1 TABLET BY MOUTH TWICE A DAY, Disp: 180 tablet, Rfl: 3    multivitamin with minerals tablet tablet, Take 1 tablet by mouth 2 (Two) Times a Day., Disp: , Rfl:     rivaroxaban (Xarelto) 20 MG tablet, Take 1 tablet by mouth Daily., Disp: 14 tablet, Rfl: 0    Syringe/Needle, Disp, 25G X 1\" 3 ML misc, Use with B12 injections, Disp: 100 each, Rfl: 0    valsartan (DIOVAN) 160 MG tablet, Take 1 tablet by mouth Every Morning., Disp: 90 tablet, Rfl: 3    VITAMIN D PO, Take  by mouth Daily., Disp: , Rfl:     ALLERGIES:     Allergies   Allergen Reactions    Penicillins Hives     Several years ago - did seek aid at an emergency department but no " "SOB/anaphylaxis symptoms noted     Moxifloxacin Other (See Comments)     PUPILS IN HER EYES WERE VERY LARGE     Paco-Bacit-Poly-Lidocaine Rash       SOCIAL HISTORY:       Social History     Socioeconomic History    Marital status: Single   Tobacco Use    Smoking status: Never     Passive exposure: Never    Smokeless tobacco: Never   Vaping Use    Vaping status: Never Used   Substance and Sexual Activity    Alcohol use: Never    Drug use: Never     Types: Hydrocodone     Comment: Pt is prescribe hydrocodone    Sexual activity: Never         FAMILY HISTORY:  Family History   Problem Relation Age of Onset    Heart attack Mother 86    Stroke Mother     Heart disease Mother 80    Hypertension Mother     Heart failure Mother     Arthritis Mother     Hyperlipidemia Mother     Heart attack Brother 70    Asthma Brother     Hyperlipidemia Brother     Thyroid disease Brother     Arthritis Brother     Heart failure Father     Asthma Father     Kidney disease Sister         Stage 5 on dialysis    Diabetes Maternal Grandmother     Asthma Brother     Thyroid disease Brother     Cancer Sister     Diabetes Sister     Kidney disease Sister     Vision loss Maternal Uncle     Hyperlipidemia Brother     Thyroid disease Brother     Arthritis Brother     Cancer Sister         breast cancer    Kidney disease Sister     Kidney disease Sister         Stage 5 on dialysis       REVIEW OF SYSTEMS:  Review of Systems  See HPI          Vitals:    07/29/25 1521   BP: 163/86   Pulse: 64   TempSrc: Oral   SpO2: 98%   Weight: 88 kg (194 lb)   Height: 157.5 cm (62.01\")   PainSc: 0-No pain         7/29/2025     3:23 PM   Current Status   ECOG score 1      PHYSICAL EXAM:      Physical Exam    CONSTITUTIONAL:  Vital signs reviewed.  Well-developed, well-nourished elderly female.  No distress, looks comfortable.  EYES:  Conjunctivae and lids unremarkable.    EARS,NOSE,MOUTH,THROAT:  Ears and nose appear unremarkable.  Lips appear " unremarkable.  RESPIRATORY:  Normal respiratory effort.  Lungs clear to auscultation bilaterally.    CARDIOVASCULAR:  Normal S1, S2.  No murmurs rubs or gallops.  No significant lower extremity edema.  GASTROINTESTINAL: Abdomen appears unremarkable.  Nontender.  No hepatomegaly.  No splenomegaly.  SKIN:  Warm.  No rashes.  PSYCHIATRIC:  Normal judgment and insight.  Normal mood and affect.    RECENT LABS:  Lab Results   Component Value Date    WBC 10.06 07/29/2025    HGB 15.9 07/29/2025    HCT 51.6 (H) 07/29/2025    MCV 81.3 07/29/2025     07/29/2025     Lab Results   Component Value Date    NEUTROABS 5.49 07/29/2025     Lab Results   Component Value Date    GLUCOSE 73 05/05/2025    BUN 25 (H) 05/05/2025    CREATININE 0.90 05/05/2025     05/05/2025    K 5.1 05/05/2025     05/05/2025    CALCIUM 10.4 05/05/2025    PROTEINTOT 8.2 05/05/2025    ALBUMIN 4.6 05/05/2025    ALT 22 05/05/2025    AST 38 (H) 05/05/2025    ALKPHOS 86 05/05/2025    BILITOT 0.7 05/05/2025    GLOB 3.6 05/05/2025    AGRATIO 1.3 05/05/2025    BCR 27.8 (H) 05/05/2025    ANIONGAP 15.0 03/11/2025    EGFR 63.2 05/05/2025       Lab Results   Component Value Date    FOLATE >20.00 03/18/2025     Lab Results   Component Value Date    LABIRON 9 (L) 03/18/2025    TIBC 498 03/18/2025    FERRITIN 26.10 03/18/2025   Free iron 44 on 3/18/2025  iron saturation 9% on 3/18/2025     Latest Reference Range & Units 03/18/25 15:47 05/05/25 10:51   Vitamin B-12 211 - 946 pg/mL 246 438         Assessment & Plan   Assessment & Plan  1. Iron deficiency.  She has low iron levels with a ferritin of 26 and iron saturation of 9% on 3/18/2025.  Her microcytosis is due to iron deficiency.  Dietary recommendations were made to include iron-rich foods such as spinach and red meat.  Iron treatment, especially intravenous iron therapy is not recommended at this time due to the risk of further increasing her red blood cell count.  See next section.    2. Vitamin B12  deficiency.  Lab study 3/18/2025 reported low B12 level 246 pg/mL.  She is receiving initially weekly for 4 doses and then monthly vitamin B12 injections, which have improved her B12 level on 5/5/2025 but they remain on the low side. Continued supplementation with vitamin B12 injections is recommended.    3. Elevated hematocrit with microcytosis.  This patient was found to having erythrocytosis and elevated hematocrit since March 2025.  In September 2023 she had a normal hemoglobin hematocrit and RBC population.  No lab results in 2024.    For present having iron deficiency and the B12 deficiency, we expect to see anemia however her red blood cell count and hematocrit, hemoglobin remain high despite iron deficiency and B12 deficiency. The potential causes of elevated red blood cell counts were discussed, including the possibility of a JAK2 mutation as well as elevation of erythropoietin.  Laboratory studies will be conducted to check for JAK2 mutation V600 17F, and also exon 12-15 mutation.  Will also check red cell growth factor. If the JAK2 mutation is present, treatment with hydroxyurea will be considered.      4. Thrombocytosis.  Thrombocytosis was initially discovered in March 2025 through May 2025 with multiple laboratory studies.  However today 7/29/2025 her platelet count has significantly improved and is now within the normal range at 368,000.  No good explanation for normalized platelet counts today.  The potential causes of elevated platelet counts were discussed, including the possibility of JAK2 mutation. A blood test will be conducted to check for this mutation. If the JAK2 mutation is present, treatment with hydroxyurea will be considered.      5. Pulmonary hypertension.  She has a history of pulmonary hypertension but does not require oxygen therapy.    6. Atrial fibrillation.  She is currently taking Xarelto for atrial fibrillation. No changes to her current medication regimen are recommended at  this time.         Treatment Plan:   Laboratory studies will be conducted to check for the erythropoietin level, and also JAK2 mutation including JAK2 [id number] mutation, and exon 12-15 mutation.   Continue Xarelto for atrial fibrillation which will be helpful preventing thrombosis associated with erythrocytosis.  There is no need to take extra aspirin.  Continue vitamin B12 injection at her primary care provider's office.  I will see patient in 6 weeks for reevaluation with repeated CBC.    This patient is not a good candidate for phlebotomy due to iron deficiency.  If the JAK2 mutation is present, an earlier appointment will be scheduled.  Treatment with hydroxyurea will be considered. Hydroxyurea is typically well-tolerated at low doses and can help manage elevated red blood cell counts.        I spent 62 minutes caring for Tamara on this date of service. This time includes time spent by me in the following activities: preparing for the visit, reviewing tests, obtaining and/or reviewing a separately obtained history, performing a medically appropriate examination and/or evaluation, counseling and educating the patient/family/caregiver, ordering medications, tests, or procedures, referring and communicating with other health care professionals, documenting information in the medical record, independently interpreting results and communicating that information with the patient/family/caregiver and care coordination       MAYRA SOLORZANO M.D., Ph.D.        CC:  YISEL Doherty

## 2025-07-30 LAB — EPO SERPL-ACNC: 5.5 MIU/ML (ref 2.6–18.5)

## 2025-07-31 ENCOUNTER — PATIENT ROUNDING (BHMG ONLY) (OUTPATIENT)
Dept: ONCOLOGY | Facility: CLINIC | Age: 85
End: 2025-07-31
Payer: MEDICARE

## 2025-07-31 LAB — JAK2 V617F RESULT: NORMAL

## 2025-08-04 ENCOUNTER — RESULTS FOLLOW-UP (OUTPATIENT)
Dept: LAB | Facility: HOSPITAL | Age: 85
End: 2025-08-04
Payer: MEDICARE

## 2025-08-15 ENCOUNTER — LAB (OUTPATIENT)
Dept: LAB | Facility: HOSPITAL | Age: 85
End: 2025-08-15
Payer: MEDICARE

## 2025-08-15 ENCOUNTER — TELEPHONE (OUTPATIENT)
Dept: ONCOLOGY | Facility: CLINIC | Age: 85
End: 2025-08-15

## 2025-08-15 ENCOUNTER — OFFICE VISIT (OUTPATIENT)
Dept: ONCOLOGY | Facility: CLINIC | Age: 85
End: 2025-08-15
Payer: MEDICARE

## 2025-08-15 VITALS
WEIGHT: 197 LBS | HEART RATE: 95 BPM | TEMPERATURE: 97.3 F | SYSTOLIC BLOOD PRESSURE: 130 MMHG | HEIGHT: 62 IN | OXYGEN SATURATION: 96 % | DIASTOLIC BLOOD PRESSURE: 83 MMHG | BODY MASS INDEX: 36.25 KG/M2

## 2025-08-15 DIAGNOSIS — D75.1 ERYTHROCYTOSIS: ICD-10-CM

## 2025-08-15 DIAGNOSIS — D75.839 THROMBOCYTOSIS: ICD-10-CM

## 2025-08-15 DIAGNOSIS — D75.1 ERYTHROCYTOSIS: Primary | ICD-10-CM

## 2025-08-15 LAB
BASOPHILS # BLD AUTO: 0.07 10*3/MM3 (ref 0–0.2)
BASOPHILS NFR BLD AUTO: 0.6 % (ref 0–1.5)
DEPRECATED RDW RBC AUTO: 51 FL (ref 37–54)
EOSINOPHIL # BLD AUTO: 0.24 10*3/MM3 (ref 0–0.4)
EOSINOPHIL NFR BLD AUTO: 2.1 % (ref 0.3–6.2)
ERYTHROCYTE [DISTWIDTH] IN BLOOD BY AUTOMATED COUNT: 19 % (ref 12.3–15.4)
HCT VFR BLD AUTO: 51.9 % (ref 34–46.6)
HGB BLD-MCNC: 16.5 G/DL (ref 12–15.9)
IMM GRANULOCYTES # BLD AUTO: 0.07 10*3/MM3 (ref 0–0.05)
IMM GRANULOCYTES NFR BLD AUTO: 0.6 % (ref 0–0.5)
LYMPHOCYTES # BLD AUTO: 3.18 10*3/MM3 (ref 0.7–3.1)
LYMPHOCYTES NFR BLD AUTO: 27.2 % (ref 19.6–45.3)
MCH RBC QN AUTO: 25.9 PG (ref 26.6–33)
MCHC RBC AUTO-ENTMCNC: 31.8 G/DL (ref 31.5–35.7)
MCV RBC AUTO: 81.6 FL (ref 79–97)
MONOCYTES # BLD AUTO: 1.2 10*3/MM3 (ref 0.1–0.9)
MONOCYTES NFR BLD AUTO: 10.3 % (ref 5–12)
NEUTROPHILS NFR BLD AUTO: 59.2 % (ref 42.7–76)
NEUTROPHILS NFR BLD AUTO: 6.94 10*3/MM3 (ref 1.7–7)
NRBC BLD AUTO-RTO: 0 /100 WBC (ref 0–0.2)
PLATELET # BLD AUTO: 351 10*3/MM3 (ref 140–450)
PMV BLD AUTO: 10.6 FL (ref 6–12)
RBC # BLD AUTO: 6.36 10*6/MM3 (ref 3.77–5.28)
WBC NRBC COR # BLD AUTO: 11.7 10*3/MM3 (ref 3.4–10.8)

## 2025-08-15 PROCEDURE — 85025 COMPLETE CBC W/AUTO DIFF WBC: CPT

## 2025-08-15 PROCEDURE — 36415 COLL VENOUS BLD VENIPUNCTURE: CPT

## 2025-08-15 RX ORDER — HYDROXYUREA 500 MG/1
500 CAPSULE ORAL
Qty: 18 CAPSULE | Refills: 1 | Status: SHIPPED | OUTPATIENT
Start: 2025-08-16

## 2025-08-18 ENCOUNTER — TELEPHONE (OUTPATIENT)
Dept: ONCOLOGY | Facility: CLINIC | Age: 85
End: 2025-08-18
Payer: MEDICARE

## 2025-08-25 ENCOUNTER — SPECIALTY PHARMACY (OUTPATIENT)
Dept: ONCOLOGY | Facility: HOSPITAL | Age: 85
End: 2025-08-25
Payer: MEDICARE

## 2025-08-25 ENCOUNTER — OFFICE VISIT (OUTPATIENT)
Dept: ONCOLOGY | Facility: CLINIC | Age: 85
End: 2025-08-25
Payer: MEDICARE

## 2025-08-25 VITALS
SYSTOLIC BLOOD PRESSURE: 145 MMHG | HEART RATE: 58 BPM | OXYGEN SATURATION: 95 % | RESPIRATION RATE: 16 BRPM | DIASTOLIC BLOOD PRESSURE: 85 MMHG

## 2025-08-25 DIAGNOSIS — Z15.89 JAK-2 GENE MUTATION: Primary | ICD-10-CM

## 2025-08-25 DIAGNOSIS — D75.1 ERYTHROCYTOSIS: ICD-10-CM

## 2025-08-25 PROCEDURE — 99212 OFFICE O/P EST SF 10 MIN: CPT | Performed by: NURSE PRACTITIONER

## 2025-08-25 PROCEDURE — 1126F AMNT PAIN NOTED NONE PRSNT: CPT | Performed by: NURSE PRACTITIONER

## 2025-08-25 PROCEDURE — 3077F SYST BP >= 140 MM HG: CPT | Performed by: NURSE PRACTITIONER

## 2025-08-25 PROCEDURE — 1159F MED LIST DOCD IN RCRD: CPT | Performed by: NURSE PRACTITIONER

## 2025-08-25 PROCEDURE — 3079F DIAST BP 80-89 MM HG: CPT | Performed by: NURSE PRACTITIONER

## 2025-08-25 PROCEDURE — 1160F RVW MEDS BY RX/DR IN RCRD: CPT | Performed by: NURSE PRACTITIONER

## 2025-08-26 ENCOUNTER — SPECIALTY PHARMACY (OUTPATIENT)
Dept: PHARMACY | Facility: HOSPITAL | Age: 85
End: 2025-08-26
Payer: MEDICARE